# Patient Record
Sex: MALE | Race: WHITE | Employment: FULL TIME | URBAN - NONMETROPOLITAN AREA
[De-identification: names, ages, dates, MRNs, and addresses within clinical notes are randomized per-mention and may not be internally consistent; named-entity substitution may affect disease eponyms.]

---

## 2017-03-27 RX ORDER — PANTOPRAZOLE SODIUM 40 MG/1
TABLET, DELAYED RELEASE ORAL
Qty: 180 TABLET | Refills: 1 | Status: SHIPPED | OUTPATIENT
Start: 2017-03-27 | End: 2017-09-25 | Stop reason: SDUPTHER

## 2017-03-27 RX ORDER — METOPROLOL SUCCINATE 25 MG/1
TABLET, EXTENDED RELEASE ORAL
Qty: 180 TABLET | Refills: 1 | Status: SHIPPED | OUTPATIENT
Start: 2017-03-27 | End: 2017-09-25 | Stop reason: SDUPTHER

## 2017-06-26 RX ORDER — ATORVASTATIN CALCIUM 40 MG/1
TABLET, FILM COATED ORAL
Qty: 90 TABLET | Refills: 2 | Status: SHIPPED | OUTPATIENT
Start: 2017-06-26 | End: 2018-09-19 | Stop reason: SDUPTHER

## 2017-09-01 ENCOUNTER — OFFICE VISIT (OUTPATIENT)
Dept: CARDIOLOGY CLINIC | Age: 52
End: 2017-09-01
Payer: COMMERCIAL

## 2017-09-01 VITALS
DIASTOLIC BLOOD PRESSURE: 76 MMHG | SYSTOLIC BLOOD PRESSURE: 116 MMHG | HEIGHT: 72 IN | WEIGHT: 313.4 LBS | BODY MASS INDEX: 42.45 KG/M2 | HEART RATE: 81 BPM

## 2017-09-01 DIAGNOSIS — E78.01 FAMILIAL HYPERCHOLESTEROLEMIA: ICD-10-CM

## 2017-09-01 DIAGNOSIS — I25.10 CORONARY ARTERY DISEASE INVOLVING NATIVE CORONARY ARTERY OF NATIVE HEART WITHOUT ANGINA PECTORIS: ICD-10-CM

## 2017-09-01 DIAGNOSIS — I10 ESSENTIAL HYPERTENSION: ICD-10-CM

## 2017-09-01 DIAGNOSIS — I51.9 HEART DISEASE: Primary | ICD-10-CM

## 2017-09-01 PROCEDURE — 99213 OFFICE O/P EST LOW 20 MIN: CPT | Performed by: NUCLEAR MEDICINE

## 2017-09-01 PROCEDURE — 93000 ELECTROCARDIOGRAM COMPLETE: CPT | Performed by: NUCLEAR MEDICINE

## 2017-09-01 RX ORDER — ALPRAZOLAM 0.25 MG/1
0.25 TABLET ORAL NIGHTLY PRN
Qty: 30 TABLET | Refills: 0 | Status: SHIPPED | OUTPATIENT
Start: 2017-09-01 | End: 2017-10-01

## 2017-09-01 RX ORDER — NITROGLYCERIN 0.4 MG/1
0.4 TABLET SUBLINGUAL EVERY 5 MIN PRN
Qty: 25 TABLET | Refills: 3 | Status: SHIPPED | OUTPATIENT
Start: 2017-09-01 | End: 2021-08-17 | Stop reason: SDUPTHER

## 2017-09-02 ENCOUNTER — HOSPITAL ENCOUNTER (OUTPATIENT)
Age: 52
Discharge: HOME | End: 2017-09-02
Payer: COMMERCIAL

## 2017-09-02 DIAGNOSIS — E78.01 FAMILIAL HYPERCHOLESTEROLEMIA: ICD-10-CM

## 2017-09-02 LAB
ALBUMIN SERPL-MCNC: 3.6 G/DL (ref 3.5–5.1)
ALP BLD-CCNC: 79 U/L (ref 38–126)
ALT SERPL-CCNC: 33 U/L (ref 11–66)
AST SERPL-CCNC: 24 U/L (ref 5–40)
BILIRUB SERPL-MCNC: 0.5 MG/DL (ref 0.3–1.2)
BILIRUBIN DIRECT: < 0.2 MG/DL (ref 0–0.3)
TOTAL PROTEIN: 6.6 G/DL (ref 6.1–8)

## 2017-09-02 PROCEDURE — 80061 LIPID PANEL: CPT

## 2017-09-02 PROCEDURE — 80076 HEPATIC FUNCTION PANEL: CPT

## 2017-09-02 PROCEDURE — 36415 COLL VENOUS BLD VENIPUNCTURE: CPT

## 2017-09-03 LAB
CHOLESTEROL, TOTAL: 112 MG/DL (ref 100–199)
HDLC SERPL-MCNC: 42 MG/DL
LDL CHOLESTEROL CALCULATED: 56 MG/DL
TRIGL SERPL-MCNC: 71 MG/DL (ref 0–199)

## 2017-09-25 RX ORDER — PANTOPRAZOLE SODIUM 40 MG/1
TABLET, DELAYED RELEASE ORAL
Qty: 180 TABLET | Refills: 3 | Status: SHIPPED | OUTPATIENT
Start: 2017-09-25 | End: 2018-08-10 | Stop reason: SDUPTHER

## 2017-09-25 RX ORDER — METOPROLOL SUCCINATE 25 MG/1
TABLET, EXTENDED RELEASE ORAL
Qty: 180 TABLET | Refills: 3 | Status: SHIPPED | OUTPATIENT
Start: 2017-09-25 | End: 2018-08-10 | Stop reason: SDUPTHER

## 2018-01-24 ENCOUNTER — HOSPITAL ENCOUNTER (EMERGENCY)
Age: 53
Discharge: HOME | End: 2018-01-24
Payer: COMMERCIAL

## 2018-01-24 VITALS
HEART RATE: 101 BPM | TEMPERATURE: 99.8 F | HEIGHT: 72 IN | SYSTOLIC BLOOD PRESSURE: 122 MMHG | BODY MASS INDEX: 42.26 KG/M2 | DIASTOLIC BLOOD PRESSURE: 67 MMHG | WEIGHT: 312 LBS | RESPIRATION RATE: 16 BRPM | OXYGEN SATURATION: 96 %

## 2018-01-24 DIAGNOSIS — J11.1 INFLUENZA-LIKE ILLNESS: Primary | ICD-10-CM

## 2018-01-24 LAB
FLU A ANTIGEN: NEGATIVE
FLU B ANTIGEN: NEGATIVE

## 2018-01-24 PROCEDURE — 87804 INFLUENZA ASSAY W/OPTIC: CPT

## 2018-01-24 PROCEDURE — 99213 OFFICE O/P EST LOW 20 MIN: CPT

## 2018-01-24 PROCEDURE — 99213 OFFICE O/P EST LOW 20 MIN: CPT | Performed by: NURSE PRACTITIONER

## 2018-01-24 RX ORDER — OSELTAMIVIR PHOSPHATE 75 MG/1
75 CAPSULE ORAL 2 TIMES DAILY
Qty: 10 CAPSULE | Refills: 0 | Status: SHIPPED | OUTPATIENT
Start: 2018-01-24 | End: 2018-01-29

## 2018-01-24 ASSESSMENT — PAIN SCALES - GENERAL: PAINLEVEL_OUTOF10: 10

## 2018-01-24 ASSESSMENT — ENCOUNTER SYMPTOMS
FACIAL SWELLING: 0
COUGH: 1
SINUS PRESSURE: 0
WHEEZING: 0
PHOTOPHOBIA: 0
VOMITING: 0
SWOLLEN GLANDS: 0
SHORTNESS OF BREATH: 0
BLURRED VISION: 0
SORE THROAT: 0
NAUSEA: 1
RHINORRHEA: 1

## 2018-01-24 ASSESSMENT — PAIN DESCRIPTION - LOCATION: LOCATION: HEAD

## 2018-01-25 NOTE — ED PROVIDER NOTES
[address]oss[address] [id number]  Urgent Care Encounter       CHIEF COMPLAINT       Chief Complaint   Patient presents with    Headache     chills/sweating    Generalized Body Aches       Nurses Notes reviewed and I agree except as noted in the HPI. HISTORY OF PRESENT ILLNESS   Bryce Monroy is a 52 y.o. male who presents To the urgent care center complaining of generalized body aches chills headache nonproductive cough. Patient states symptoms started today when he was at work at 4 PM.  He works as a  states he finished his shift came here for evaluation. At the present time patient sitting in the room as his code on complains of feeling chilled and headache. The history is provided by the patient.    Headache   Pain location:  Generalized  Quality:  Dull  Radiates to:  Does not radiate  Severity currently:  10/10  Severity at highest:  10/10  Onset quality:  Sudden  Duration:  1 day  Timing:  Constant  Progression:  Waxing and waning  Chronicity:  New  Context: coughing    Relieved by:  Nothing  Worsened by:  Nothing  Ineffective treatments:  Acetaminophen and NSAIDs  Associated symptoms: congestion, cough, fatigue, fever, loss of balance, myalgias and nausea    Associated symptoms: no blurred vision, no dizziness, no drainage, no ear pain, no hearing loss, no neck pain, no neck stiffness, no paresthesias, no photophobia, no sinus pressure, no sore throat, no swollen glands, no syncope, no URI and no vomiting    Cough:     Cough characteristics:  Non-productive    Severity:  Mild    Onset quality:  Gradual    Duration:  1 day    Timing:  Intermittent    Progression:  Waxing and waning    Chronicity:  New  Fatigue:     Severity:  Moderate    Duration:  1 day    Timing:  Constant    Progression:  Unchanged  Fever:     Duration:  1 day    Timing:  Intermittent    Max temp PTA:  99.8    Temp source:  Oral and temporal    Progression:  Worsening  Myalgias:     Location:  Generalized    Quality: Cyanocobalamin (VITAMIN B 12 PO) Take 1,000 mg by mouth. ALLERGIES     Patient is has No Known Allergies. Patients   Immunization History   Administered Date(s) Administered    Pneumococcal Conjugate 7-valent 10/29/2011       FAMILY HISTORY     Patient's family history includes COPD in his mother; Cancer in his father and mother; Coronary Art Dis in his father and mother; Heart Disease in his father and mother. SOCIAL HISTORY     Patient  reports that he quit smoking about 15 years ago. His smoking use included Cigarettes. His smokeless tobacco use includes Chew. He reports that he drinks alcohol. He reports that he does not use drugs. PHYSICAL EXAM     ED TRIAGE VITALS  BP: 122/67, Temp: 99.8 °F (37.7 °C), Pulse: 101, Resp: 16, SpO2: 96 %,Estimated body mass index is 42.31 kg/m² as calculated from the following:    Height as of this encounter: 6' (1.829 m). Weight as of this encounter: 312 lb (141.5 kg). ,No LMP for male patient. Physical Exam   Constitutional: He is oriented to person, place, and time. Vital signs are normal. He appears well-developed and well-nourished. He is cooperative. Non-toxic appearance. He does not have a sickly appearance. He does not appear ill. No distress. HENT:   Head: Normocephalic. Right Ear: Hearing, tympanic membrane, external ear and ear canal normal. No drainage, swelling or tenderness. No mastoid tenderness. Tympanic membrane is not perforated and not erythematous. Left Ear: Hearing, tympanic membrane, external ear and ear canal normal. No drainage, swelling or tenderness. No mastoid tenderness. Tympanic membrane is not perforated and not erythematous. Nose: Nose normal. No mucosal edema or rhinorrhea. Right sinus exhibits no maxillary sinus tenderness and no frontal sinus tenderness. Left sinus exhibits no maxillary sinus tenderness and no frontal sinus tenderness.    Mouth/Throat: Uvula is midline, oropharynx is clear and moist and mucous membranes are normal. No oropharyngeal exudate, posterior oropharyngeal edema or posterior oropharyngeal erythema. Neck: Normal range of motion and full passive range of motion without pain. Neck supple. No spinous process tenderness and no muscular tenderness present. Cardiovascular: Regular rhythm, S1 normal, S2 normal and normal heart sounds. Tachycardia present. Pulmonary/Chest: Effort normal and breath sounds normal. No accessory muscle usage. No respiratory distress. He has no decreased breath sounds. He has no wheezes. He has no rhonchi. He has no rales. He exhibits no tenderness. Abdominal: Normal appearance. Lymphadenopathy:        Head (right side): No submental, no submandibular, no tonsillar, no preauricular, no posterior auricular and no occipital adenopathy present. Head (left side): No submental, no submandibular, no tonsillar, no preauricular, no posterior auricular and no occipital adenopathy present. He has no cervical adenopathy. Right: No supraclavicular adenopathy present. Left: No supraclavicular adenopathy present. Neurological: He is alert and oriented to person, place, and time. Skin: Skin is warm and dry. He is not diaphoretic. Nursing note and vitals reviewed. DIAGNOSTIC RESULTS   Labs:  Results for orders placed or performed during the hospital encounter of 01/24/18   Rapid influenza A/B antigens   Result Value Ref Range    Flu A Antigen NEGATIVE NEGATIVE    Flu B Antigen NEGATIVE NEGATIVE       IMAGING:    No orders to display         EKG:      URGENT CARE COURSE:     Vitals:    01/24/18 1943   BP: 122/67   Pulse: 101   Resp: 16   Temp: 99.8 °F (37.7 °C)   TempSrc: Temporal   SpO2: 96%   Weight: (!) 312 lb (141.5 kg)   Height: 6' (1.829 m)       Medications - No data to display    ED Course        PROCEDURES:  None    FINAL IMPRESSION      1.  Influenza-like illness          DISPOSITION/PLAN   DISPOSITION Decision To Discharge

## 2018-08-12 RX ORDER — METOPROLOL SUCCINATE 25 MG/1
TABLET, EXTENDED RELEASE ORAL
Qty: 180 TABLET | Refills: 4 | Status: SHIPPED | OUTPATIENT
Start: 2018-08-12 | End: 2018-09-19 | Stop reason: SDUPTHER

## 2018-08-12 RX ORDER — PANTOPRAZOLE SODIUM 40 MG/1
TABLET, DELAYED RELEASE ORAL
Qty: 180 TABLET | Refills: 4 | Status: SHIPPED | OUTPATIENT
Start: 2018-08-12 | End: 2018-09-19 | Stop reason: SDUPTHER

## 2018-09-19 ENCOUNTER — OFFICE VISIT (OUTPATIENT)
Dept: CARDIOLOGY CLINIC | Age: 53
End: 2018-09-19
Payer: COMMERCIAL

## 2018-09-19 VITALS
HEIGHT: 72 IN | HEART RATE: 93 BPM | SYSTOLIC BLOOD PRESSURE: 140 MMHG | BODY MASS INDEX: 42.29 KG/M2 | DIASTOLIC BLOOD PRESSURE: 82 MMHG | WEIGHT: 312.2 LBS

## 2018-09-19 DIAGNOSIS — I51.9 HEART DISEASE: ICD-10-CM

## 2018-09-19 DIAGNOSIS — R07.2 PRECORDIAL PAIN: ICD-10-CM

## 2018-09-19 DIAGNOSIS — I25.10 CORONARY ARTERY DISEASE INVOLVING NATIVE CORONARY ARTERY OF NATIVE HEART WITHOUT ANGINA PECTORIS: Primary | ICD-10-CM

## 2018-09-19 DIAGNOSIS — Z98.62 S/P ANGIOPLASTY: ICD-10-CM

## 2018-09-19 DIAGNOSIS — E78.00 PURE HYPERCHOLESTEROLEMIA: ICD-10-CM

## 2018-09-19 PROCEDURE — 99214 OFFICE O/P EST MOD 30 MIN: CPT | Performed by: NUCLEAR MEDICINE

## 2018-09-19 PROCEDURE — 93000 ELECTROCARDIOGRAM COMPLETE: CPT | Performed by: NUCLEAR MEDICINE

## 2018-09-19 RX ORDER — ATORVASTATIN CALCIUM 40 MG/1
TABLET, FILM COATED ORAL
Qty: 90 TABLET | Refills: 2 | Status: SHIPPED | OUTPATIENT
Start: 2018-09-19 | End: 2019-11-20 | Stop reason: SDUPTHER

## 2018-09-19 RX ORDER — METOPROLOL SUCCINATE 25 MG/1
TABLET, EXTENDED RELEASE ORAL
Qty: 180 TABLET | Refills: 4 | Status: SHIPPED | OUTPATIENT
Start: 2018-09-19 | End: 2019-10-16 | Stop reason: SDUPTHER

## 2018-09-19 RX ORDER — PANTOPRAZOLE SODIUM 40 MG/1
TABLET, DELAYED RELEASE ORAL
Qty: 180 TABLET | Refills: 4 | Status: SHIPPED | OUTPATIENT
Start: 2018-09-19 | End: 2019-10-16 | Stop reason: SDUPTHER

## 2018-09-19 NOTE — PROGRESS NOTES
Stenting of distal LAD using Xience 2.5 X 15 overlapping distally with Xience 2.5 X 12 mm, Dr. Sheryle Short Testosterone deficiency     Thyroid nodule     The patient is euthyroid.  Unspecified sleep apnea       Past Surgical History:   Procedure Laterality Date    APPENDECTOMY      BACK SURGERY      X 2 FOR HERNIATED DISCS    CARDIOVASCULAR STRESS TEST  3 26 2010    Mild chest discomfort induced by IV Lexiscan that resolved spontaneously. No arrhythmias. No EKG changes to suggest ischemia.  CARPAL TUNNEL RELEASE  1980    COLONOSCOPY  2013    CORONARY ANGIOPLASTY      X 2 STENTS    DIAGNOSTIC CARDIAC CATH LAB PROCEDURE  2 22 2010    Successful drug-eluting stent x 2 of mid LAD. LV borderline normal LV function. EF 50-55%. No wall motion abnormalities detected and no MR. Normal hemodynamics. Coronaries - diffuse left anterior descending (LAD) disease with proximal 50% and distal around 70%. [facility]    right    KNEE SURGERY  1980    left    LYMPH NODE BIOPSY  2011    neck    [address]    UPPER GASTROINTESTINAL ENDOSCOPY  2013     Family History   Problem Relation Age of Onset    Heart Disease Mother         Bypass/Surgery.  Cancer Mother         Breast cancer.  Coronary Art Dis Mother         History of CAD with myocardial infarction and open heart surgery in her 63's.  COPD Mother     Heart Disease Father         Bypass/Surgery.  Cancer Father         Leukemia.  Coronary Art Dis Father         History of CAD with myocardial infarction and open heart surgery around his 52's.  Prostate Cancer Neg Hx      Social History   Substance Use Topics    Smoking status: Former Smoker     Types: Cigarettes     Quit date: 8/3/2002    Smokeless tobacco: Current User     Types: Chew      Comment: Patient states, \"he chews tobacco.\"     Alcohol use 0.0 oz/week      Comment: Patient states, \"seldom. \"      Current Outpatient Prescriptions   Medication Sig

## 2018-10-04 ENCOUNTER — HOSPITAL ENCOUNTER (OUTPATIENT)
Dept: NON INVASIVE DIAGNOSTICS | Age: 53
Discharge: HOME | End: 2018-10-04
Payer: COMMERCIAL

## 2018-10-04 ENCOUNTER — TELEPHONE (OUTPATIENT)
Dept: CARDIOLOGY CLINIC | Age: 53
End: 2018-10-04

## 2018-10-04 DIAGNOSIS — R07.2 PRECORDIAL PAIN: ICD-10-CM

## 2018-10-04 DIAGNOSIS — I25.10 CORONARY ARTERY DISEASE INVOLVING NATIVE CORONARY ARTERY OF NATIVE HEART WITHOUT ANGINA PECTORIS: ICD-10-CM

## 2018-10-04 DIAGNOSIS — Z98.62 S/P ANGIOPLASTY: ICD-10-CM

## 2018-10-04 LAB
LV EF: 60 %
LVEF MODALITY: NORMAL

## 2018-10-04 PROCEDURE — 3430000000 HC RX DIAGNOSTIC RADIOPHARMACEUTICAL: Performed by: NUCLEAR MEDICINE

## 2018-10-04 PROCEDURE — 6360000002 HC RX W HCPCS

## 2018-10-04 PROCEDURE — 78452 HT MUSCLE IMAGE SPECT MULT: CPT

## 2018-10-04 PROCEDURE — 93306 TTE W/DOPPLER COMPLETE: CPT

## 2018-10-04 PROCEDURE — 2709999900 HC NON-CHARGEABLE SUPPLY

## 2018-10-04 PROCEDURE — 93017 CV STRESS TEST TRACING ONLY: CPT | Performed by: NUCLEAR MEDICINE

## 2018-10-04 PROCEDURE — A9500 TC99M SESTAMIBI: HCPCS | Performed by: NUCLEAR MEDICINE

## 2018-10-04 RX ADMIN — Medication 32.7 MILLICURIE: at 08:52

## 2018-10-04 RX ADMIN — Medication 9.4 MILLICURIE: at 08:07

## 2018-12-02 ENCOUNTER — HOSPITAL ENCOUNTER (EMERGENCY)
Age: 53
Discharge: HOME | End: 2018-12-02
Payer: COMMERCIAL

## 2018-12-02 VITALS
WEIGHT: 310 LBS | BODY MASS INDEX: 41.99 KG/M2 | DIASTOLIC BLOOD PRESSURE: 76 MMHG | HEART RATE: 85 BPM | HEIGHT: 72 IN | OXYGEN SATURATION: 95 % | SYSTOLIC BLOOD PRESSURE: 130 MMHG | TEMPERATURE: 98.9 F | RESPIRATION RATE: 16 BRPM

## 2018-12-02 DIAGNOSIS — J20.9 ACUTE BRONCHITIS, UNSPECIFIED ORGANISM: Primary | ICD-10-CM

## 2018-12-02 PROCEDURE — 99212 OFFICE O/P EST SF 10 MIN: CPT

## 2018-12-02 PROCEDURE — 99214 OFFICE O/P EST MOD 30 MIN: CPT | Performed by: NURSE PRACTITIONER

## 2018-12-02 RX ORDER — DOXYCYCLINE HYCLATE 100 MG/1
100 CAPSULE ORAL 2 TIMES DAILY
Qty: 14 CAPSULE | Refills: 0 | Status: SHIPPED | OUTPATIENT
Start: 2018-12-02 | End: 2018-12-09

## 2018-12-02 RX ORDER — ALBUTEROL SULFATE 90 UG/1
2 AEROSOL, METERED RESPIRATORY (INHALATION) EVERY 4 HOURS PRN
Qty: 1 INHALER | Refills: 0 | Status: SHIPPED | OUTPATIENT
Start: 2018-12-02 | End: 2019-05-02

## 2018-12-02 RX ORDER — DEXTROMETHORPHAN HYDROBROMIDE AND PROMETHAZINE HYDROCHLORIDE 15; 6.25 MG/5ML; MG/5ML
5 SYRUP ORAL NIGHTLY PRN
Qty: 35 ML | Refills: 0 | Status: SHIPPED | OUTPATIENT
Start: 2018-12-02 | End: 2018-12-02

## 2018-12-02 RX ORDER — DOXYCYCLINE HYCLATE 100 MG/1
100 CAPSULE ORAL 2 TIMES DAILY
Qty: 14 CAPSULE | Refills: 0 | Status: SHIPPED | OUTPATIENT
Start: 2018-12-02 | End: 2018-12-02

## 2018-12-02 RX ORDER — DEXTROMETHORPHAN HYDROBROMIDE AND PROMETHAZINE HYDROCHLORIDE 15; 6.25 MG/5ML; MG/5ML
5 SYRUP ORAL NIGHTLY PRN
Qty: 35 ML | Refills: 0 | Status: SHIPPED | OUTPATIENT
Start: 2018-12-02 | End: 2018-12-09

## 2018-12-02 ASSESSMENT — ENCOUNTER SYMPTOMS
TROUBLE SWALLOWING: 0
SINUS PRESSURE: 0
WHEEZING: 1
SHORTNESS OF BREATH: 0
SINUS CONGESTION: 0
CHEST TIGHTNESS: 0
VOMITING: 0
RHINORRHEA: 0
COUGH: 1
STRIDOR: 0
SORE THROAT: 0
NAUSEA: 0
DIARRHEA: 0
VOICE CHANGE: 0

## 2019-03-29 ENCOUNTER — HOSPITAL ENCOUNTER (EMERGENCY)
Age: 54
Discharge: HOME | End: 2019-03-29
Payer: COMMERCIAL

## 2019-03-29 VITALS
BODY MASS INDEX: 40.63 KG/M2 | RESPIRATION RATE: 20 BRPM | HEART RATE: 93 BPM | DIASTOLIC BLOOD PRESSURE: 68 MMHG | HEIGHT: 72 IN | OXYGEN SATURATION: 95 % | SYSTOLIC BLOOD PRESSURE: 136 MMHG | TEMPERATURE: 98.3 F | WEIGHT: 300 LBS

## 2019-03-29 DIAGNOSIS — J01.10 ACUTE FRONTAL SINUSITIS, RECURRENCE NOT SPECIFIED: Primary | ICD-10-CM

## 2019-03-29 PROCEDURE — 99212 OFFICE O/P EST SF 10 MIN: CPT

## 2019-03-29 PROCEDURE — 99213 OFFICE O/P EST LOW 20 MIN: CPT | Performed by: NURSE PRACTITIONER

## 2019-03-29 RX ORDER — PREDNISONE 20 MG/1
20 TABLET ORAL 2 TIMES DAILY
Qty: 10 TABLET | Refills: 0 | Status: SHIPPED | OUTPATIENT
Start: 2019-03-29 | End: 2019-04-03

## 2019-03-29 RX ORDER — AMOXICILLIN AND CLAVULANATE POTASSIUM 875; 125 MG/1; MG/1
1 TABLET, FILM COATED ORAL 2 TIMES DAILY
Qty: 14 TABLET | Refills: 0 | Status: SHIPPED | OUTPATIENT
Start: 2019-03-29 | End: 2019-04-05

## 2019-03-29 ASSESSMENT — PAIN DESCRIPTION - PROGRESSION: CLINICAL_PROGRESSION: GRADUALLY WORSENING

## 2019-03-29 ASSESSMENT — ENCOUNTER SYMPTOMS
DIARRHEA: 0
VOMITING: 0
WHEEZING: 0
RHINORRHEA: 0
SINUS PAIN: 0
SINUS PRESSURE: 1
NAUSEA: 0
SORE THROAT: 0
SHORTNESS OF BREATH: 0
COUGH: 1

## 2019-03-29 ASSESSMENT — PAIN DESCRIPTION - ONSET: ONSET: GRADUAL

## 2019-03-29 ASSESSMENT — PAIN DESCRIPTION - PAIN TYPE: TYPE: ACUTE PAIN

## 2019-03-29 ASSESSMENT — PAIN DESCRIPTION - FREQUENCY: FREQUENCY: CONTINUOUS

## 2019-03-29 ASSESSMENT — PAIN DESCRIPTION - DESCRIPTORS: DESCRIPTORS: ACHING

## 2019-03-29 ASSESSMENT — PAIN - FUNCTIONAL ASSESSMENT: PAIN_FUNCTIONAL_ASSESSMENT: ACTIVITIES ARE NOT PREVENTED

## 2019-03-29 ASSESSMENT — PAIN DESCRIPTION - LOCATION: LOCATION: HEAD

## 2019-03-29 NOTE — ED PROVIDER NOTES
[address]  Urgent Care Encounter       CHIEF COMPLAINT       Chief Complaint   Patient presents with    Cough    Nasal Congestion    Headache       Nurses Notes reviewed and I agree except as noted in the HPI. HISTORY OF PRESENT ILLNESS   Yamilet Ambrocio is a 62 y.o. male who presents to [facility] of cough, nasal congestion and headache for the past 3 days. Patient initially lost his voice but this has resolved. Patient reports cough is productive of light green sputum on occasion. Headache is located in the frontal area. Patient reports PND and a fever last night of 100°F.  No fever prior to or after. Ears are normal and no complaints of sore throat. The history is provided by the patient. REVIEW OF SYSTEMS     Review of Systems   Constitutional: Positive for fatigue and fever. Negative for appetite change and chills. HENT: Positive for congestion, postnasal drip and sinus pressure. Negative for ear pain, rhinorrhea, sinus pain and sore throat. Respiratory: Positive for cough. Negative for shortness of breath and wheezing. Cardiovascular: Negative for chest pain. Gastrointestinal: Negative for diarrhea, nausea and vomiting. Musculoskeletal: Negative for myalgias. Neurological: Positive for headaches. Negative for dizziness. PAST MEDICAL HISTORY         Diagnosis Date    Anxiety attacks     Anxiety due to family issues.  Arthritis     Asthma     as child    Atypical chest pain     Cuevas esophagus     Chronic back pain     Coronary artery disease     S/P stent of the LAD by Dr. Olimpia Palafox on 2 23 2010.  Erectile dysfunction     Family history of premature CAD     Gastroesophageal reflux disease     Groin hematoma     Herniated disc 2011    back and neck    History of erectile dysfunction 2008    History of giardia infection     History of Giardia.  History of iron deficiency     History of pulmonary embolus (PE) 1980's.     Remote history of pulmonary embolus and negative computed tomography for pulmonary embolus.  History of smoking     Hyperlipidemia     Well managed.  Hypogonadism, male     The patient has central hypogonadism.  Joint pain     Morbid obesity     S/P angioplasty     S/P PTCA: 1/12/2015: FFR-guided stenting of distal and mid-LAD using Xience Alpine 2.5X15, post-2.77, and Alpine 2.75X18 mm, post-3.18 mm.  1/12/2015 1/12/2015: FFR-guided stenting of distal and mid-LAD using Xience Alpine 2.5 X 15 mm, post-dilated to 2.77 mm, and Xience Alpine 2.75 X 18 mm, post-dilated to 3.18 mm. Dr. Wing Cheadle   2/22/2010: Stenting of distal LAD using Xience 2.5 X 15 overlapping distally with Xience 2.5 X 12 mm, Dr. Christal Hou Testosterone deficiency     Thyroid nodule     The patient is euthyroid.  Unspecified sleep apnea        SURGICALHISTORY     Patient  has a past surgical history that includes back surgery; Carpal tunnel release (1980); Elbow surgery (1980); knee surgery (1980); Appendectomy; Coronary angioplasty; cardiovascular stress test (3 26 2010); Diagnostic Cardiac Cath Lab Procedure (2 22 2010); lymph node biopsy (2011); Spine surgery (1993, 2000); Upper gastrointestinal endoscopy (2013); and Colonoscopy (2013).     CURRENT MEDICATIONS       Discharge Medication List as of 3/29/2019  3:02 PM      CONTINUE these medications which have NOT CHANGED    Details   albuterol sulfate  (90 Base) MCG/ACT inhaler Inhale 2 puffs into the lungs every 4 hours as needed for Wheezing or Shortness of Breath, Disp-1 Inhaler, R-0Normal      pantoprazole (PROTONIX) 40 MG tablet TAKE 1 TABLET TWICE A DAY, Disp-180 tablet, R-4Normal      metoprolol succinate (TOPROL XL) 25 MG extended release tablet TAKE 1 TABLET TWICE A DAY, Disp-180 tablet, R-4Normal      atorvastatin (LIPITOR) 40 MG tablet TAKE 1 TABLET EVERY OTHER DAY, Disp-90 tablet, R-2Normal      nitroGLYCERIN (NITROSTAT) 0.4 MG SL tablet Place 1 tablet under the tongue every 5 minutes as needed for Chest pain, Disp-25 tablet, R-3Normal      Multiple Vitamins-Minerals (THERAPEUTIC MULTIVITAMIN-MINERALS) tablet Take 1 tablet by mouth daily      aspirin 81 MG tablet Take 81 mg by mouth daily. Cyanocobalamin (VITAMIN B 12 PO) Take 1,000 mg by mouth. ALLERGIES     Patient is has No Known Allergies. Patients   Immunization History   Administered Date(s) Administered    Pneumococcal Conjugate 7-valent 10/29/2011       FAMILY HISTORY     Patient's family history includes COPD in his mother; Cancer in his father and mother; Coronary Art Dis in his father and mother; Heart Disease in his father and mother. SOCIAL HISTORY     Patient  reports that he quit smoking about 16 years ago. His smoking use included cigarettes. His smokeless tobacco use includes chew. He reports that he drinks alcohol. He reports that he does not use drugs. PHYSICAL EXAM     ED TRIAGE VITALS  BP: 136/68, Temp: 98.3 °F (36.8 °C), Pulse: 93, Resp: 20, SpO2: 95 %,Estimated body mass index is 40.69 kg/m² as calculated from the following:    Height as of this encounter: 6' (1.829 m). Weight as of this encounter: 300 lb (136.1 kg). ,No LMP for male patient. Physical Exam   Constitutional: He is oriented to person, place, and time. Vital signs are normal. He appears well-developed and well-nourished. He is cooperative. He appears ill (mild). HENT:   Head: Normocephalic and atraumatic. Right Ear: Hearing, tympanic membrane, external ear and ear canal normal.   Left Ear: Hearing, tympanic membrane, external ear and ear canal normal.   Nose: Right sinus exhibits frontal sinus tenderness. Right sinus exhibits no maxillary sinus tenderness. Left sinus exhibits frontal sinus tenderness. Left sinus exhibits no maxillary sinus tenderness. Mouth/Throat: Uvula is midline and mucous membranes are normal. Posterior oropharyngeal erythema (mild) present.  No oropharyngeal exudate, posterior oropharyngeal edema or tonsillar abscesses. Neck: Neck supple. Cardiovascular: Normal rate, regular rhythm and normal heart sounds. Pulmonary/Chest: Effort normal and breath sounds normal. No respiratory distress. Lymphadenopathy:        Head (right side): No submental, no submandibular, no tonsillar, no preauricular, no posterior auricular and no occipital adenopathy present. Head (left side): No submental, no submandibular, no tonsillar, no preauricular, no posterior auricular and no occipital adenopathy present. He has no cervical adenopathy. Neurological: He is alert and oriented to person, place, and time. Skin: Skin is warm and dry. Psychiatric: He has a normal mood and affect. His speech is normal and behavior is normal.   Nursing note and vitals reviewed. DIAGNOSTIC RESULTS     Labs:No results found for this visit on 03/29/19. IMAGING:    No orders to display         EKG:      URGENT CARE COURSE:     Vitals:    03/29/19 1432   BP: 136/68   Pulse: 93   Resp: 20   Temp: 98.3 °F (36.8 °C)   SpO2: 95%   Weight: 300 lb (136.1 kg)   Height: 6' (1.829 m)       Medications - No data to display         PROCEDURES:  None    FINAL IMPRESSION      1. Acute frontal sinusitis, recurrence not specified          DISPOSITION/ PLAN     Plenty of fluids orally. Salt water gargles as needed for sore throat or OTC throat spray/lozenges. Cool mist humidifier at bedside for congestion. Saline rinses as needed for nasal congestion. May take Tylenol and/or Ibuprofen for fever or body aches as directed. May take OTC antihistamines/ decongestant as needed. Follow up with family doctor as needed. Pt to go to ER if symptoms worsen, new symptoms develop, high fever >102, vomiting, breathing difficulty, lethargy. Take antibiotic as prescribed until gone. Do not stop taking even if your symptoms have improved. Prednisone as prescribed for the full 5 days.     Patient with acute sinusitis and will be treated with Augmentin ×7 days and prednisone burst ×5 days. Continue with the over-the-counter decongestants as desired. Follow-up with family doctor in the next week if not improved. Further instructions outlined above. All the patient's questions answered. The patient/parent agreed with the plan. The patient was discharged from the  center in good condition.       PATIENT REFERRED TO:  Gabe Monroe, DO  [address] [facility] [address] / [facility] [address]      DISCHARGE MEDICATIONS:  Discharge Medication List as of 3/29/2019  3:02 PM      START taking these medications    Details   amoxicillin-clavulanate (AUGMENTIN) 875-125 MG per tablet Take 1 tablet by mouth 2 times daily for 7 days, Disp-14 tablet, R-0Normal      predniSONE (DELTASONE) 20 MG tablet Take 1 tablet by mouth 2 times daily for 5 days, Disp-10 tablet, R-0Normal             Discharge Medication List as of 3/29/2019  3:02 PM          Discharge Medication List as of 3/29/2019  3:02 PM          KANIKA Morrison CNP    (Please note that portions of this note were completed with a voice recognition program. Efforts were made to edit the dictations but occasionally words are mis-transcribed.)         KANIKA Morrison CNP  03/29/19 4771

## 2019-03-29 NOTE — ED NOTES
PT GIVEN DISCHARGE INSTRUCTIONS, VERBALIZES UNDERSTANDING. PT ASSESSMENT UNCHANGED, DISCHARGED IN STABLE CONDITION.         Dana Alonso RN  03/29/19 1056

## 2019-03-29 NOTE — ED TRIAGE NOTES
Pt to urgent care with c/o congestion , cough and headache. New onset of symptoms started roughly on Monday.

## 2019-04-01 ENCOUNTER — TELEPHONE (OUTPATIENT)
Dept: FAMILY MEDICINE CLINIC | Age: 54
End: 2019-04-01

## 2019-04-10 ENCOUNTER — OFFICE VISIT (OUTPATIENT)
Dept: CARDIOLOGY CLINIC | Age: 54
End: 2019-04-10
Payer: COMMERCIAL

## 2019-04-10 VITALS
DIASTOLIC BLOOD PRESSURE: 78 MMHG | BODY MASS INDEX: 42.66 KG/M2 | SYSTOLIC BLOOD PRESSURE: 120 MMHG | WEIGHT: 315 LBS | HEART RATE: 86 BPM | HEIGHT: 72 IN

## 2019-04-10 DIAGNOSIS — I10 ESSENTIAL HYPERTENSION: ICD-10-CM

## 2019-04-10 DIAGNOSIS — I25.10 CORONARY ARTERY DISEASE INVOLVING NATIVE CORONARY ARTERY OF NATIVE HEART WITHOUT ANGINA PECTORIS: Primary | ICD-10-CM

## 2019-04-10 DIAGNOSIS — E78.01 FAMILIAL HYPERCHOLESTEROLEMIA: ICD-10-CM

## 2019-04-10 PROCEDURE — 99213 OFFICE O/P EST LOW 20 MIN: CPT | Performed by: NUCLEAR MEDICINE

## 2019-04-10 NOTE — PROGRESS NOTES
Thyroid nodule     The patient is euthyroid.  Unspecified sleep apnea       Past Surgical History:   Procedure Laterality Date    APPENDECTOMY      BACK SURGERY      X 2 FOR HERNIATED DISCS    CARDIOVASCULAR STRESS TEST  3 26 2010    Mild chest discomfort induced by IV Lexiscan that resolved spontaneously. No arrhythmias. No EKG changes to suggest ischemia.  CARPAL TUNNEL RELEASE      COLONOSCOPY      CORONARY ANGIOPLASTY      X 2 STENTS    DIAGNOSTIC CARDIAC CATH LAB PROCEDURE  2010    Successful drug-eluting stent x 2 of mid LAD. LV borderline normal LV function. EF 50-55%. No wall motion abnormalities detected and no MR. Normal hemodynamics. Coronaries - diffuse left anterior descending (LAD) disease with proximal 50% and distal around 70%. [address] [facility],     right    KNEE SURGERY      left    LYMPH NODE BIOPSY      neck    [facility]    UPPER GASTROINTESTINAL ENDOSCOPY  2013     Family History   Problem Relation Age of Onset    Heart Disease Mother         Bypass/Surgery.  Cancer Mother         Breast cancer.  Coronary Art Dis Mother         History of CAD with myocardial infarction and open heart surgery in her 63's.  COPD Mother     Heart Disease Father         Bypass/Surgery.  Cancer Father         Leukemia.  Coronary Art Dis Father         History of CAD with myocardial infarction and open heart surgery around his 52's.  Prostate Cancer Neg Hx      Social History     Tobacco Use    Smoking status: Former Smoker     Types: Cigarettes     Last attempt to quit: 8/3/2002     Years since quittin.6    Smokeless tobacco: Current User     Types: Chew    Tobacco comment: Patient states, \"he chews tobacco.\"    Substance Use Topics    Alcohol use: Yes     Alcohol/week: 0.0 oz     Comment: Patient states, \"seldom. \"      Current Outpatient Medications   Medication Sig Dispense Refill    albuterol sulfate  (90 Base) MCG/ACT inhaler Inhale 2 puffs into the lungs every 4 hours as needed for Wheezing or Shortness of Breath 1 Inhaler 0    pantoprazole (PROTONIX) 40 MG tablet TAKE 1 TABLET TWICE A  tablet 4    metoprolol succinate (TOPROL XL) 25 MG extended release tablet TAKE 1 TABLET TWICE A  tablet 4    atorvastatin (LIPITOR) 40 MG tablet TAKE 1 TABLET EVERY OTHER DAY 90 tablet 2    nitroGLYCERIN (NITROSTAT) 0.4 MG SL tablet Place 1 tablet under the tongue every 5 minutes as needed for Chest pain 25 tablet 3    Multiple Vitamins-Minerals (THERAPEUTIC MULTIVITAMIN-MINERALS) tablet Take 1 tablet by mouth daily      aspirin 81 MG tablet Take 81 mg by mouth daily.  Cyanocobalamin (VITAMIN B 12 PO) Take 1,000 mg by mouth. No current facility-administered medications for this visit. No Known Allergies  Health Maintenance   Topic Date Due    Hepatitis C screen  1965    Pneumococcal 0-64 years Vaccine (1 of 1 - PPSV23) 01/21/1969    HIV screen  01/21/1978    DTaP/Tdap/Td vaccine (1 - Tdap) 01/21/1982    Diabetes screen  01/21/2003    Shingles Vaccine (1 of 2) 01/21/2013    Colon cancer screen colonoscopy  01/21/2013    Flu vaccine (Season Ended) 09/01/2019    Lipid screen  09/02/2022       Subjective:  Review of Systems  General:   No fever, no chills, No fatigue or weight loss  Pulmonary:    No dyspnea, no wheezing  Cardiac:    Denies recent chest pain,   GI:     No nausea or vomiting, no abdominal pain  Neuro:    No dizziness or light headedness,   Musculoskeletal:  No recent active issues  Extremities:   No edema, good peripheral pulses      Objective:  Physical Exam  BP (!) 143/82   Pulse 86   Ht 6' (1.829 m)   Wt (!) 329 lb (149.2 kg)   BMI 44.62 kg/m²   General:   Well developed, well nourished  Lungs:   Clear to auscultation  Heart:    Normal S1 S2, Slight murmur.  no rubs, no gallops  Abdomen:   Soft, non tender, no organomegalies, positive bowel sounds  Extremities:   No edema, no cyanosis, good peripheral pulses  Neurological:   Awake, alert, oriented. No obvious focal deficits  Musculoskelatal:  No obvious deformities    Assessment:      Diagnosis Orders   1. Coronary artery disease involving native coronary artery of native heart without angina pectoris     2. Essential hypertension     3. Familial hypercholesterolemia     cardiac fair for now       Plan:  No follow-ups on file. As above  Refer to sleep clinic   Continue risk factor modification and medical management  Thank you for allowing me to participate in the care of your patient. Please don't hesitate to contact me regarding any further issues related to the patient care    Orders Placed:  No orders of the defined types were placed in this encounter. Medications Prescribed:  No orders of the defined types were placed in this encounter. Discussed use, benefit, and side effects of prescribed medications. All patient questions answered. Pt voicedunderstanding. Instructed to continue current medications, diet and exercise. Continue risk factor modification and medical management. Patient agreed with treatment plan. Follow up as directed.     Electronically signedby Susana Chisholm MD on 4/10/2019 at 4:04 PM

## 2019-04-23 ENCOUNTER — HOSPITAL ENCOUNTER (EMERGENCY)
Age: 54
Discharge: HOME | End: 2019-04-23
Payer: COMMERCIAL

## 2019-04-23 ENCOUNTER — APPOINTMENT (OUTPATIENT)
Dept: GENERAL RADIOLOGY | Age: 54
End: 2019-04-23
Payer: COMMERCIAL

## 2019-04-23 VITALS
OXYGEN SATURATION: 96 % | SYSTOLIC BLOOD PRESSURE: 137 MMHG | WEIGHT: 300 LBS | BODY MASS INDEX: 40.63 KG/M2 | RESPIRATION RATE: 18 BRPM | HEIGHT: 72 IN | DIASTOLIC BLOOD PRESSURE: 68 MMHG | TEMPERATURE: 98 F | HEART RATE: 104 BPM

## 2019-04-23 DIAGNOSIS — G89.29 CHRONIC LEFT-SIDED LOW BACK PAIN WITHOUT SCIATICA: Primary | ICD-10-CM

## 2019-04-23 DIAGNOSIS — M54.50 CHRONIC LEFT-SIDED LOW BACK PAIN WITHOUT SCIATICA: Primary | ICD-10-CM

## 2019-04-23 DIAGNOSIS — M25.552 LEFT HIP PAIN: ICD-10-CM

## 2019-04-23 DIAGNOSIS — M16.12 OSTEOARTHRITIS OF LEFT HIP, UNSPECIFIED OSTEOARTHRITIS TYPE: ICD-10-CM

## 2019-04-23 PROCEDURE — 72100 X-RAY EXAM L-S SPINE 2/3 VWS: CPT

## 2019-04-23 PROCEDURE — 96372 THER/PROPH/DIAG INJ SC/IM: CPT

## 2019-04-23 PROCEDURE — 99283 EMERGENCY DEPT VISIT LOW MDM: CPT

## 2019-04-23 PROCEDURE — 6360000002 HC RX W HCPCS: Performed by: PHYSICIAN ASSISTANT

## 2019-04-23 PROCEDURE — 73502 X-RAY EXAM HIP UNI 2-3 VIEWS: CPT

## 2019-04-23 PROCEDURE — 6370000000 HC RX 637 (ALT 250 FOR IP): Performed by: PHYSICIAN ASSISTANT

## 2019-04-23 RX ORDER — NAPROXEN 500 MG/1
500 TABLET ORAL 2 TIMES DAILY WITH MEALS
Qty: 60 TABLET | Refills: 0 | Status: SHIPPED | OUTPATIENT
Start: 2019-04-23 | End: 2020-05-20 | Stop reason: ALTCHOICE

## 2019-04-23 RX ORDER — TRAMADOL HYDROCHLORIDE 50 MG/1
50 TABLET ORAL ONCE
Status: COMPLETED | OUTPATIENT
Start: 2019-04-23 | End: 2019-04-23

## 2019-04-23 RX ORDER — PREDNISONE 20 MG/1
20 TABLET ORAL 2 TIMES DAILY
Qty: 10 TABLET | Refills: 0 | Status: SHIPPED | OUTPATIENT
Start: 2019-04-23 | End: 2019-04-28

## 2019-04-23 RX ORDER — CYCLOBENZAPRINE HCL 10 MG
10 TABLET ORAL 3 TIMES DAILY PRN
Qty: 15 TABLET | Refills: 0 | Status: SHIPPED | OUTPATIENT
Start: 2019-04-23 | End: 2019-04-28

## 2019-04-23 RX ORDER — CYCLOBENZAPRINE HCL 10 MG
10 TABLET ORAL ONCE
Status: COMPLETED | OUTPATIENT
Start: 2019-04-23 | End: 2019-04-23

## 2019-04-23 RX ORDER — METHYLPREDNISOLONE SODIUM SUCCINATE 125 MG/2ML
80 INJECTION, POWDER, LYOPHILIZED, FOR SOLUTION INTRAMUSCULAR; INTRAVENOUS ONCE
Status: COMPLETED | OUTPATIENT
Start: 2019-04-23 | End: 2019-04-23

## 2019-04-23 RX ADMIN — TRAMADOL HYDROCHLORIDE 50 MG: 50 TABLET, FILM COATED ORAL at 22:37

## 2019-04-23 RX ADMIN — CYCLOBENZAPRINE HYDROCHLORIDE 10 MG: 10 TABLET, FILM COATED ORAL at 23:32

## 2019-04-23 RX ADMIN — METHYLPREDNISOLONE SODIUM SUCCINATE 80 MG: 125 INJECTION, POWDER, FOR SOLUTION INTRAMUSCULAR; INTRAVENOUS at 22:37

## 2019-04-23 ASSESSMENT — ENCOUNTER SYMPTOMS
COLOR CHANGE: 0
SORE THROAT: 0
EYE REDNESS: 0
COUGH: 0
DIARRHEA: 0
EYE DISCHARGE: 0
VOMITING: 0
ABDOMINAL PAIN: 0
RHINORRHEA: 0
WHEEZING: 0
NAUSEA: 0
CONSTIPATION: 0
SHORTNESS OF BREATH: 0

## 2019-04-23 ASSESSMENT — PAIN DESCRIPTION - DESCRIPTORS: DESCRIPTORS: ACHING

## 2019-04-23 ASSESSMENT — PAIN DESCRIPTION - ORIENTATION: ORIENTATION: LEFT

## 2019-04-23 ASSESSMENT — PAIN DESCRIPTION - LOCATION: LOCATION: HIP

## 2019-04-23 ASSESSMENT — PAIN SCALES - GENERAL
PAINLEVEL_OUTOF10: 10
PAINLEVEL_OUTOF10: 10

## 2019-04-23 ASSESSMENT — PAIN DESCRIPTION - FREQUENCY: FREQUENCY: CONTINUOUS

## 2019-04-23 ASSESSMENT — PAIN DESCRIPTION - PAIN TYPE: TYPE: ACUTE PAIN

## 2019-04-24 ENCOUNTER — TELEPHONE (OUTPATIENT)
Dept: FAMILY MEDICINE CLINIC | Age: 54
End: 2019-04-24

## 2019-04-24 NOTE — ED TRIAGE NOTES
Pt comes to the ED with left hip pain. Pt has been going to the chiropractor for the past 3 weeks for this and today his pain is extreme. Pt denies any injury.

## 2019-04-24 NOTE — ED PROVIDER NOTES
Psychiatric/Behavioral: Negative for agitation, confusion, dysphoric mood and suicidal ideas. The patient is not nervous/anxious. PAST MEDICAL HISTORY     Past Medical History:   Diagnosis Date    Anxiety attacks     Anxiety due to family issues.  Arthritis     Asthma     as child    Atypical chest pain     Cuevas esophagus     Chronic back pain     Coronary artery disease     S/P stent of the LAD by Dr. Swapnil Puentes on 2 23 2010.  Erectile dysfunction     Family history of premature CAD     Gastroesophageal reflux disease     Groin hematoma     Herniated disc 2011    back and neck    History of erectile dysfunction 2008    History of giardia infection     History of Giardia.  History of iron deficiency     History of pulmonary embolus (PE) 1980's. Remote history of pulmonary embolus and negative computed tomography for pulmonary embolus.  History of smoking     Hyperlipidemia     Well managed.  Hypogonadism, male     The patient has central hypogonadism.  Joint pain     Morbid obesity     S/P angioplasty     S/P PTCA: 1/12/2015: FFR-guided stenting of distal and mid-LAD using Xience Alpine 2.5X15, post-2.77, and Alpine 2.75X18 mm, post-3.18 mm.  1/12/2015 1/12/2015: FFR-guided stenting of distal and mid-LAD using Xience Alpine 2.5 X 15 mm, post-dilated to 2.77 mm, and Xience Alpine 2.75 X 18 mm, post-dilated to 3.18 mm. Dr. Delmy Owusu   2/22/2010: Stenting of distal LAD using Xience 2.5 X 15 overlapping distally with Xience 2.5 X 12 mm, Dr. Damian Coleman Testosterone deficiency     Thyroid nodule     The patient is euthyroid.  Unspecified sleep apnea        SURGICALHISTORY      has a past surgical history that includes back surgery; Carpal tunnel release (1980); Elbow surgery (1980); knee surgery (1980); Appendectomy; Coronary angioplasty; cardiovascular stress test (3 26 2010); Diagnostic Cardiac Cath Lab Procedure (2 22 2010); lymph node biopsy (2011);  Spine surgery Smoking status: Former Smoker     Types: Cigarettes     Last attempt to quit: 8/3/2002     Years since quittin.7    Smokeless tobacco: Current User     Types: Chew    Tobacco comment: Patient states, \"he chews tobacco.\"    Substance and Sexual Activity    Alcohol use: Yes     Alcohol/week: 0.0 oz     Comment: Patient states, \"seldom. \"    Drug use: No    Sexual activity: Yes   Lifestyle    Physical activity:     Days per week: Not on file     Minutes per session: Not on file    Stress: Not on file   Relationships    Social connections:     Talks on phone: Not on file     Gets together: Not on file     Attends Hinduism service: Not on file     Active member of club or organization: Not on file     Attends meetings of clubs or organizations: Not on file     Relationship status: Not on file    Intimate partner violence:     Fear of current or ex partner: Not on file     Emotionally abused: Not on file     Physically abused: Not on file     Forced sexual activity: Not on file   Other Topics Concern    Not on file   Social History Narrative    Not on file       PHYSICAL EXAM     INITIAL VITALS:  height is 6' (1.829 m) and weight is 300 lb (136.1 kg). His oral temperature is 98 °F (36.7 °C). His blood pressure is 137/68 and his pulse is 104. His respiration is 18 and oxygen saturation is 96%. Physical Exam   Constitutional: He is oriented to person, place, and time. He appears well-developed and well-nourished. No distress. HENT:   Head: Normocephalic and atraumatic. Right Ear: External ear normal.   Left Ear: External ear normal.   Nose: Nose normal.   Mouth/Throat: Oropharynx is clear and moist.   Eyes: Pupils are equal, round, and reactive to light. Conjunctivae and EOM are normal. Right eye exhibits no discharge. Left eye exhibits no discharge. No scleral icterus. Neck: Normal range of motion. Neck supple. Cardiovascular: Normal rate, regular rhythm and normal heart sounds.  Exam reveals no erythema. No pallor. Psychiatric: He has a normal mood and affect. His behavior is normal. Thought content normal.   Nursing note and vitals reviewed. DIFFERENTIAL DIAGNOSIS:   Includes but not limited to: chronic pain, arthritis, strain, sprain, paraspinal strain vs sprain vs paraspinal muscle spasm, herniated disc, sciatica, degenerative disc disease, radicular pain. Low suspicion for: vertebral subluxation or fracture, cauda equina, discitis, epidural abscess, or central canal compromise based on history and physical exam.    DIAGNOSTIC RESULTS     EKG: All EKG's are interpreted by the Emergency Department Physician who eithersigns or Co-signs this chart in the absence of a cardiologist.    None     RADIOLOGY: non-plainfilm images(s) such as CT, Ultrasound and MRI are read by the radiologist.  Plain radiographic images are visualized and preliminarily interpreted by the emergency physician unless otherwise stated below. XR LUMBAR SPINE (2-3 VIEWS)   Final Result   Chronic changes of the lumbar and lower thoracic spine segments. Further assessment with MRI can be considered if acute fracture changes are suspected. **This report has been created using voice recognition software. It may contain minor errors which are inherent in voice recognition technology. **      Final report electronically signed by Dr. Adelina Holt on 4/23/2019 10:47 PM      XR HIP LEFT (2-3 VIEWS)   Final Result   1. Negative exam for left hip fracture or dislocation. 2. Mild degenerative arthropathy present. .               **This report has been created using voice recognition software. It may contain minor errors which are inherent in voice recognition technology. **      Final report electronically signed by Dr. Adelina Holt on 4/23/2019 10:37 PM            LABS:   Labs Reviewed - No data to display    EMERGENCY DEPARTMENT COURSE:   Vitals:    Vitals:    04/23/19 2140   BP: 137/68   Pulse: 104   Resp: 18   Temp: 98 °F (36.7 °C)   TempSrc: Oral   SpO2: 96%   Weight: 300 lb (136.1 kg)   Height: 6' (1.829 m)     MDM  The patient was seen and evaluated within the ED today with left lower back and left hip pain. Within the department, I observed the patient's vital signs to be within acceptable range. On exam, I appreciated no midline spinal or paraspinal tenderness of the cervical, thoracic, or lumbar spine. There is no tenderness of the left hip, but the patient does have mildly decreased range of motion and strength of the left hip secondary to his pain. Patient has full ROM and strength of the lower extremities. There is intact sensation of soft touch in the lower extremities. The lower extremities appear to be neurovascularly intact. Radiologic studies within the department revealed degenerative changes but no acute fracture, dislocation, or soft tissue abnormality. Within the department, the patient was treated with Tramadol, Solumedrol, and Flexeril. I observed the patient's condition to improve during the duration of the stay. I explained my proposed course of treatment to the patient, who was amenable to my treatment and discharge decisions. He was discharged home in stable condition with prescriptions for EC Naprosyn, Prednisone, Flexeril, and the patient will return to the ED if the symptoms become more severe in nature or otherwise change. I discussed with the patient that Flexeril is sedating and advised the patient to caution while driving, operating heavy machinery, or drinking alcohol. Patient vocalized understanding. CRITICAL CARE:   None    CONSULTS:  None    PROCEDURES:  None    FINAL IMPRESSION     1. Chronic left-sided low back pain without sciatica    2. Left hip pain    3.  Osteoarthritis of left hip, unspecified osteoarthritis type          DISPOSITION/PLAN   I have given the patient strict written and verbal instructions about care at home, follow-up, and signs and symptoms of worsening of condition, and the patient did verbalize understanding of these instructions. Patient was discharged in stable condition. Will return if symptoms change or worsen, or for any sign or symptom deemed emergent by the patient or family members. Follow up as an outpatient or sooner if symptoms warrant. PATIENT REFERREDTO:  Abdiel Mckenna 5, [address]  [phone]    Call in 3 days  As needed, If symptoms worsen      DISCHARGE MEDICATIONS:  Discharge Medication List as of 4/23/2019 11:22 PM      START taking these medications    Details   naproxen (EC-NAPROSYN) 500 MG EC tablet Take 1 tablet by mouth 2 times daily (with meals), Disp-60 tablet, R-0Print      predniSONE (DELTASONE) 20 MG tablet Take 1 tablet by mouth 2 times daily for 5 days, Disp-10 tablet, R-0Print      cyclobenzaprine (FLEXERIL) 10 MG tablet Take 1 tablet by mouth 3 times daily as needed for Muscle spasms, Disp-15 tablet, R-0Print             (Please note that portions of this note were completed with a voice recognition program.  Efforts were made to edit the dictations but occasionally words are mis-transcribed.)    Scribe:  Rola Ramos 4/23/19 10:21 PM Scribing for and in the presence of Ester Card PA-C. Signed by: Darling Luke(Name), Zaheer, 04/25/19 9:08 AM    Provider:  I personally performed the services described in the documentation,reviewed and edited the documentation which was dictated to the scribe in my presence, and it accurately records my words and actions.     Ester Card PA-C 04/25/19 9:08 AM    JEFFY Trevino PA-C  04/25/19 [id number]

## 2019-04-25 ASSESSMENT — ENCOUNTER SYMPTOMS: BACK PAIN: 1

## 2019-05-02 ENCOUNTER — INITIAL CONSULT (OUTPATIENT)
Dept: PULMONOLOGY | Age: 54
End: 2019-05-02
Payer: COMMERCIAL

## 2019-05-02 VITALS
OXYGEN SATURATION: 96 % | WEIGHT: 315 LBS | HEART RATE: 84 BPM | RESPIRATION RATE: 14 BRPM | HEIGHT: 72 IN | SYSTOLIC BLOOD PRESSURE: 130 MMHG | BODY MASS INDEX: 42.66 KG/M2 | DIASTOLIC BLOOD PRESSURE: 76 MMHG

## 2019-05-02 DIAGNOSIS — G47.33 OSA TREATED WITH BIPAP: Primary | ICD-10-CM

## 2019-05-02 DIAGNOSIS — E66.01 CLASS 3 SEVERE OBESITY DUE TO EXCESS CALORIES WITH SERIOUS COMORBIDITY AND BODY MASS INDEX (BMI) OF 40.0 TO 44.9 IN ADULT (HCC): ICD-10-CM

## 2019-05-02 DIAGNOSIS — I10 ESSENTIAL HYPERTENSION: ICD-10-CM

## 2019-05-02 DIAGNOSIS — I25.10 CORONARY ARTERY DISEASE INVOLVING NATIVE HEART WITHOUT ANGINA PECTORIS, UNSPECIFIED VESSEL OR LESION TYPE: ICD-10-CM

## 2019-05-02 PROCEDURE — 99204 OFFICE O/P NEW MOD 45 MIN: CPT | Performed by: NURSE PRACTITIONER

## 2019-05-02 NOTE — PROGRESS NOTES
[facility]    Patient: Andrey Yeboah, 62 y.o.   : 1965    Pt of . Former Esequiel Mccauley   Patient presents with    New Patient     Sleep consult, former [employer] patient,         VERONIKA  Rand Salinas is here for re-establishment of care to resume treatment for KIRAN. He was diagnosed in 2012 with AHI of 16.8 (REM 50.7; Supine 70) and started on BiPAP of 13/ which at some point was increased to 15/11. He last saw Dr. Rowan Brown in  and stopped treatment that year when he could no longer get supplies/ His weight at that time was 290 lbs, currently 327 lbs, up 37 lbs. He follows with Dr. Sophia Borjas with Cardiology for CAD and was referred for follow up evaluation/treatment. Sleep Hx   SLEEP HISTORY    Sleep Symptoms  This has been evaluated or treated before. Sleep related complaints include snoring, periods of not breathing, tossing and turning, decreased concentration, excessive daytime sleepiness, difficulty falling asleep once awakened as well. Rand Salinas denies any history of seizures, sleep walking or talking and there is no history suggestive of bruxism or restless leg syndrome. Bedtime Narative  He goes to bed at 11 PM and wakes up at 6 AM. Rand Salinas reports that this is  different on the weekends. Most of the time, it takes him \"awhile\" to fall asleep with difficulty falling back to sleep if he awakens, usually because he has to go to the bathroom. Nap History  Rand Salinas does take naps very often. If he does, they are helpful. Snoring History  Harrydoes snore or has been told he snores. There have been witnessed apneas. He does awakenwith choking or gasping. Haris Dinmingo does not have recurring dreams, and specifically does not have episodes of feeling paralyzed while awake, or anything to suggest cataplexy or dreams he would describe as hallucinations.     Driving History  Rand Salinas does not report sleepiness while driving. There have not been driving accidents or near-misses related to sleepiness, fatigue or inattention. Weight Issues  There has been a change in his weight over the past 9 years of about 37 lbs. Caffeine Intake  Dina Powers does drink caffeine, usually about  5 cans per day. Employment History  Dina Powers works. The work hours are generally day shift and there has notbeen any recent changes in the shifts or hours. Family Sleep History  There are notfamily members with a history of sleep problems. Download     PAP Download: Not used PAP machine for 3-4 years  Original or initial  AHI: 16.8     Date of initial study: 7-13-12    Neck Size: 17 in   Mallampati III  ESS: 12  SAQLI: 53      Past Medical hx     PMH:  Past Medical History:   Diagnosis Date    Anxiety attacks     Anxiety due to family issues.  Arthritis     Asthma     as child    Atypical chest pain     Cuevas esophagus     Chronic back pain     Coronary artery disease     S/P stent of the LAD by Dr. Kellie Bower on 2 23 2010.  Erectile dysfunction     Family history of premature CAD     Gastroesophageal reflux disease     Groin hematoma     Herniated disc 2011    back and neck    History of erectile dysfunction 2008    History of giardia infection     History of Giardia.  History of iron deficiency     History of pulmonary embolus (PE) 1980's. Remote history of pulmonary embolus and negative computed tomography for pulmonary embolus.  History of smoking     Hyperlipidemia     Well managed.  Hypogonadism, male     The patient has central hypogonadism.      Joint pain     Morbid obesity     KIRAN (obstructive sleep apnea)     S/P angioplasty     S/P PTCA: 1/12/2015: FFR-guided stenting of distal and mid-LAD using Xience Alpine 2.5X15, post-2.77, and Alpine 2.75X18 mm, post-3.18 mm.  1/12/2015 1/12/2015: FFR-guided stenting of distal and mid-LAD using Xience Alpine 2.5 X 15 mm, post-dilated to 2.77 mm, and Xience (1.829 m)   Wt (!) 327 lb 9.6 oz (148.6 kg)   SpO2 96% Comment: on RA  BMI 44.43 kg/m²      Physical Exam  General Appearance  Moderately built, moderately nourished, in no acute distress. HEENT - Head is normocephalic, atraumatic. PERRL. Oral mucosa pink and moist, no oral thrush. Mallampati Score - IV (only hard palate visible). Neck - Supple, symmetrical, trachea midline and soft. Lungs - Chest symmetric with normal A/P diameter, normal respiratory rate and rhythm, lungs clear to auscultation, no wheezes, rales or rhonchi, aeration good. Cardiovascular - Heart sounds are normal.  Regular rhythm normal rate without murmur, gallop or rub. Abdomen - Soft, nontender, no-ndistended. Neurologic - Alert and oriented x 3. Skin - No bruising or bleeding. Extremities - No cyanosis, clubbing or edema. Sleep Study   See EPIC  Assessment      Diagnosis Orders   1. KIRAN treated with BiPAP  DME Order for CPAP as OP    DME Order for CPAP as OP   2. Coronary artery disease involving native heart without angina pectoris, unspecified vessel or lesion type     3. Essential hypertension     4. Class 3 severe obesity due to excess calories with serious comorbidity and body mass index (BMI) of 40.0 to 44.9 in adult [facility])          Plan   -Resume BiPAP at 15/11. Arrange for a machine check, mask fit and new supplies.   -He is to call with any problems. - he advised to keep good compliance with current recommended pressure to get optimalresults and clinical improvement  - Recommend 7-9 hours of sleep with PAP  - he was advised to call DME company regarding supplies if needed. - he call my office for earlier appointment if needed for worseningof sleep symptoms.   - he was instructed on weight loss. - Belén Parisi  was educated about my impression and plan. Patient verbalizes understanding. We will see Belén Parisi  back in: 8 weeks with download.     Electronically signed by KANIKA Enriquez CNP on 5/2/2019 at 4:49

## 2019-05-02 NOTE — LETTER
[address] [facility] [address]  [address] [facility]  [address]  Phone: [phone]  Fax: [phone]    KANIKA Elliott CNP        May 2, 2019     Patient: Min Dockery   YOB: 1965   Date of Visit: 5/2/2019       To Whom It May Concern: It is my medical opinion that Bjorn Bowels may return to work May 3, 2019. If you have any questions or concerns, please don't hesitate to call.     Sincerely,        KANIKA Elliott CNP

## 2019-07-08 ENCOUNTER — OFFICE VISIT (OUTPATIENT)
Dept: PULMONOLOGY | Age: 54
End: 2019-07-08
Payer: COMMERCIAL

## 2019-07-08 VITALS
OXYGEN SATURATION: 97 % | BODY MASS INDEX: 42.66 KG/M2 | HEART RATE: 86 BPM | WEIGHT: 315 LBS | HEIGHT: 72 IN | SYSTOLIC BLOOD PRESSURE: 124 MMHG | DIASTOLIC BLOOD PRESSURE: 62 MMHG

## 2019-07-08 DIAGNOSIS — G47.33 OSA TREATED WITH BIPAP: Primary | ICD-10-CM

## 2019-07-08 DIAGNOSIS — E66.01 CLASS 3 SEVERE OBESITY DUE TO EXCESS CALORIES WITH SERIOUS COMORBIDITY AND BODY MASS INDEX (BMI) OF 40.0 TO 44.9 IN ADULT (HCC): ICD-10-CM

## 2019-07-08 DIAGNOSIS — K21.9 GASTROESOPHAGEAL REFLUX DISEASE WITHOUT ESOPHAGITIS: ICD-10-CM

## 2019-07-08 DIAGNOSIS — I10 BENIGN ESSENTIAL HTN: ICD-10-CM

## 2019-07-08 PROCEDURE — 99213 OFFICE O/P EST LOW 20 MIN: CPT | Performed by: NURSE PRACTITIONER

## 2019-07-08 NOTE — PATIENT INSTRUCTIONS
smoking, you improve the health of everyone who now breathes in your smoke. · Their heart, lung, and cancer risks drop, much like yours. · They are sick less. For babies and small children, living smoke-free means they're less likely to have ear infections, pneumonia, and bronchitis. · If you're a woman who is or will be pregnant someday, quitting smoking means a healthier . · Children who are close to you are less likely to become adult smokers. Where can you learn more? Go to https://icanbuypeSuninfo Information.[employer]. org and sign in to your Pledge51 account. Enter [phone] in the [facility] box to learn more about \"Learning About Benefits From Quitting Smoking. \"     If you do not have an account, please click on the \"Sign Up Now\" link. Current as of: 2018  Content Version: 12.0  © 8020-0077 [employer]. Care instructions adapted under license by [facility] ([facility]). If you have questions about a medical condition or this instruction, always ask your healthcare professional. [place] [address] any warranty or liability for your use of this information. Patient Education        Stopping Smokeless Tobacco Use: Care Instructions  Your Care Instructions    Smokeless tobacco comes in many forms, such as snuff and chewing tobacco:  · Snuff is finely ground tobacco sold in cans or pouches. Most of the time, snuff is used by putting a \"pinch\" or \"dip\" between the lower lip or cheek and the gum. · Chewing tobacco is sold as loose leaves, plugs, or twists. It is chewed or placed between the cheek and the gum or teeth. There are plenty of reasons to stop using smokeless tobacco. These products are harmful. They are not risk-free alternatives to smoking. Smokeless tobacco contains nicotine, which is addicting.  Though using smokeless tobacco is less harmful than smoking cigarettes, it can cause serious health problems, such as:  · White patches or red sores in your a better chance of quitting if you have help and support. · Join a support group for people who are trying to quit using smokeless tobacco.  · Set a quit date. Pick your date carefully so that it is not right in the middle of a big deadline or stressful time. After you quit, do not use smokeless tobacco even once. Get rid of all spit cups, cans, and pouches after your last use. Clean your house and your clothes so that they do not smell of tobacco.  · Learn how to be a non-user. Think about ways you can avoid those things that make you reach for tobacco.  ? Learn some ways to deal with cravings, like calling a friend or going for a walk. Cravings often pass. ? Avoid situations that put you at greatest risk for using smokeless tobacco. For some people, it is hard to spend time with friends without dipping or chewing. For others, they might skip a coffee break with coworkers who smoke or use smokeless tobacco.  ? Change your daily routine. Take a different route to work, or eat a meal in a different place. · Cut down on stress. Calm yourself or release tension by doing an activity you enjoy, such as reading a book, taking a hot bath, or gardening. · Talk to your doctor or pharmacist about nicotine replacement therapy. You still get nicotine, but you do not use tobacco. Nicotine replacement products help you slowly reduce the amount of nicotine you need. Many of these products are available over the counter. They include nicotine patches, gum, lozenges, and inhalers. · Ask your doctor about bupropion (Wellbutrin) or varenicline (Chantix), which are prescription medicines. They do not contain nicotine. They help you by reducing withdrawal symptoms, such as stress and anxiety. · Get regular exercise. Having healthy habits will help your body move past its craving for nicotine. · Be prepared to keep trying. Most people are not successful the first few times they try to quit.  Do not get mad at yourself if you use

## 2019-07-08 NOTE — PROGRESS NOTES
Coleen Little         [id number]  7/8/2019   No chief complaint on file. Pt of Skyler Rust    PAP Download:   Original or initial AHI: 16.8    Date of initial study: 07/13/12  Weight of initial study: 290   [x] Compliant  86.7%   []  Noncompliant 13.3%     PAP Type BIPAP   Level  IPAP:15 cmH20, EPAP: 11 cmH20   Avg Hrs/Day 6:11  AHI: 1.5   Recorded compliance dates, 06/05/19  to 07/04/19   Machine/Mfg: BiPAP Auto Interface: FFM    Provider:  [x][facility]  [][facility]  [][facility]  [][facility]         [][facility] []Other:     Neck Size: 17  Mallampati Mallampati 4  ESS:  7    Here is a scan of the most recent download:              Presentation:   Eric Baron presents for sleep medicine follow up for obstructive sleep apnea. Since the last visit, Eric Baron was set up with new supplies, has resumed treatment and is doing well with notable benefit with treatment. No new medical issues. BP and GERD symptoms well controlled. Improved sinus congestion. Purchased SoClean. Equipment issues: The pressure is  acceptable, the mask is acceptable and he  is  using the humidity. Sleep issues:  Do you feel better? Yes  More rested? Yes   Better concentration? yes    Progress History:   Since last visit any new medical issues? No  New ER or hospitlal visits? No  Any new or changes in medicines? No  Any new sleep medicines? No      Past Medical History:   Diagnosis Date    Anxiety attacks     Anxiety due to family issues.  Arthritis     Asthma     as child    Atypical chest pain     Cuevas esophagus     Chronic back pain     Coronary artery disease     S/P stent of the LAD by Dr. Tomy Moon on 2 23 2010.  Erectile dysfunction     Family history of premature CAD     Gastroesophageal reflux disease     Groin hematoma     Herniated disc 2011    back and neck    History of erectile dysfunction 2008    History of giardia infection     History of Giardia.      History of iron deficiency     History of pulmonary embolus (PE) . Remote history of pulmonary embolus and negative computed tomography for pulmonary embolus.  History of smoking     Hyperlipidemia     Well managed.  Hypogonadism, male     The patient has central hypogonadism.  Joint pain     Morbid obesity     KIRAN (obstructive sleep apnea)     S/P angioplasty     S/P PTCA: 2015: FFR-guided stenting of distal and mid-LAD using Xience Alpine 2.5X15, post-2.77, and Alpine 2.75X18 mm, post-3.18 mm.  2015: FFR-guided stenting of distal and mid-LAD using Xience Alpine 2.5 X 15 mm, post-dilated to 2.77 mm, and Xience Alpine 2.75 X 18 mm, post-dilated to 3.18 mm. Dr. Michelle Davis   2010: Stenting of distal LAD using Xience 2.5 X 15 overlapping distally with Xience 2.5 X 12 mm, Dr. Monae Cash Testosterone deficiency     Thyroid nodule     The patient is euthyroid. Past Surgical History:   Procedure Laterality Date    APPENDECTOMY      BACK SURGERY      X 2 FOR HERNIATED DISCS    CARDIOVASCULAR STRESS TEST  3 26 2010    Mild chest discomfort induced by IV Lexiscan that resolved spontaneously. No arrhythmias. No EKG changes to suggest ischemia.  CARPAL TUNNEL RELEASE      COLONOSCOPY      CORONARY ANGIOPLASTY      X 2 STENTS    DIAGNOSTIC CARDIAC CATH LAB PROCEDURE  2010    Successful drug-eluting stent x 2 of mid LAD. LV borderline normal LV function. EF 50-55%. No wall motion abnormalities detected and no MR. Normal hemodynamics. Coronaries - diffuse left anterior descending (LAD) disease with proximal 50% and distal around 70%.     [facility]    right    KNEE SURGERY      left    LYMPH NODE BIOPSY  2011    neck    [address] [facility], 2000    UPPER GASTROINTESTINAL ENDOSCOPY  2013       Social History     Tobacco Use    Smoking status: Former Smoker     Types: Cigarettes     Last attempt to quit: 8/3/2002     Years since quittin.9    Smokeless tobacco: Current User     Types: Benedetta Derrick respiratory tract/ lungs: No cough or sputum production. No hemoptysis. Cardiovascular: No palpitations or chest pain. Gastrointestinal: No nausea or vomiting. Neurological: No focal neurologiacal weakness. Extremities: No edema. Musculoskeletal: No complaints. Genitourinary: No complaints. Hematological: Negative. Psychiatric/Behavioral: Negative. Skin: No itching. Physical Exam:    BMI:  Body mass index is 44.43 kg/m². Wt Readings from Last 3 Encounters:   07/08/19 (!) 327 lb 9.6 oz (148.6 kg)   05/02/19 (!) 327 lb 9.6 oz (148.6 kg)   04/23/19 300 lb (136.1 kg)     Weight stable / unchanged  Vitals: /62 (Site: Left Upper Arm, Position: Sitting, Cuff Size: Medium Adult)   Pulse 86   Ht 6' (1.829 m)   Wt (!) 327 lb 9.6 oz (148.6 kg)   SpO2 97% Comment: on RA  BMI 44.43 kg/m²         General Appearance Moderately built, moderately nourished,  in no acute distress  HEENT - Head is normocephalic, atraumatic. PERRL. Oral mucosa pink and moist, no oral thrush. Mallampati Score - IV (only hard palate visible). Neck - Supple, symmetrical, trachea midline and soft. Lungs - Chest symmetric with normal A/P diameter, normal respiratory rate and rhythm, lungs clear to auscultation, no wheezes, rales or rhonchi, aeration good. Cardiovascular - Heart sounds are normal. Regular rhythm normal rate without murmur, gallop or rub. Abdomen - Soft, nontender, non-distended. Neurologic - Alert and oriented x 3. Skin - No bruising or bleeding. Extremities - No cyanosis, clubbing or edema. ASSESSMENT/DIAGNOSIS     Diagnosis Orders   1. KIRAN treated with BiPAP     2. Benign essential HTN     3. Gastroesophageal reflux disease without esophagitis     4. Class 3 severe obesity due to excess calories with serious comorbidity and body mass index (BMI) of 40.0 to 44.9 in adult [facility])            Plan   Do you need any equipment today?  No.    - He  was advised to continue current positive airway pressure therapy with above described pressure. - He  advised to keep goodcompliance with current recommended pressure to get optimal results and clinical improvement.  - Recommend 7-9 hours of sleep with PAP treatment. - He was advised to call [employer] company regarding supplies if needed.   -He call my office for earlier appointment if needed for worsening of sleep symptoms.   - He was instructed on weight loss. - Katie Ruiz was educated about my impression and plan. Patient verbalizes understanding. We will see Silver Shall back in: 1 year with download.     .Electronically signed by KANIKA Sheehan CNP on 7/8/2019 at 3:26 PM'

## 2019-10-16 RX ORDER — PANTOPRAZOLE SODIUM 40 MG/1
TABLET, DELAYED RELEASE ORAL
Qty: 180 TABLET | Refills: 2 | Status: SHIPPED | OUTPATIENT
Start: 2019-10-16 | End: 2020-06-18 | Stop reason: SDUPTHER

## 2019-10-16 RX ORDER — METOPROLOL SUCCINATE 25 MG/1
TABLET, EXTENDED RELEASE ORAL
Qty: 180 TABLET | Refills: 2 | Status: SHIPPED | OUTPATIENT
Start: 2019-10-16 | End: 2020-06-18 | Stop reason: SDUPTHER

## 2019-11-20 RX ORDER — ATORVASTATIN CALCIUM 40 MG/1
TABLET, FILM COATED ORAL
Qty: 90 TABLET | Refills: 2 | Status: SHIPPED | OUTPATIENT
Start: 2019-11-20 | End: 2020-06-18 | Stop reason: SDUPTHER

## 2020-05-20 ENCOUNTER — APPOINTMENT (OUTPATIENT)
Dept: GENERAL RADIOLOGY | Age: 55
End: 2020-05-20
Payer: COMMERCIAL

## 2020-05-20 ENCOUNTER — HOSPITAL ENCOUNTER (EMERGENCY)
Age: 55
Discharge: HOME | End: 2020-05-20
Payer: COMMERCIAL

## 2020-05-20 VITALS
HEIGHT: 72 IN | OXYGEN SATURATION: 97 % | SYSTOLIC BLOOD PRESSURE: 135 MMHG | TEMPERATURE: 97.3 F | WEIGHT: 315 LBS | DIASTOLIC BLOOD PRESSURE: 69 MMHG | RESPIRATION RATE: 18 BRPM | HEART RATE: 79 BPM | BODY MASS INDEX: 42.66 KG/M2

## 2020-05-20 PROCEDURE — 99213 OFFICE O/P EST LOW 20 MIN: CPT

## 2020-05-20 PROCEDURE — 73030 X-RAY EXAM OF SHOULDER: CPT

## 2020-05-20 PROCEDURE — 99213 OFFICE O/P EST LOW 20 MIN: CPT | Performed by: NURSE PRACTITIONER

## 2020-05-20 RX ORDER — NAPROXEN 500 MG/1
500 TABLET ORAL 2 TIMES DAILY WITH MEALS
Qty: 14 TABLET | Refills: 0 | Status: SHIPPED | OUTPATIENT
Start: 2020-05-20 | End: 2021-05-27

## 2020-05-20 ASSESSMENT — ENCOUNTER SYMPTOMS
ALLERGIC/IMMUNOLOGIC NEGATIVE: 1
SHORTNESS OF BREATH: 0
COLOR CHANGE: 0
BACK PAIN: 0

## 2020-05-20 ASSESSMENT — PAIN DESCRIPTION - ORIENTATION: ORIENTATION: LEFT

## 2020-05-20 ASSESSMENT — PAIN DESCRIPTION - PAIN TYPE: TYPE: ACUTE PAIN

## 2020-05-20 ASSESSMENT — PAIN SCALES - GENERAL: PAINLEVEL_OUTOF10: 5

## 2020-05-20 ASSESSMENT — PAIN DESCRIPTION - DESCRIPTORS: DESCRIPTORS: SHARP

## 2020-05-20 ASSESSMENT — PAIN DESCRIPTION - FREQUENCY: FREQUENCY: CONTINUOUS

## 2020-05-20 ASSESSMENT — PAIN DESCRIPTION - LOCATION: LOCATION: SHOULDER

## 2020-05-20 NOTE — LETTER
[address] [facility]  [facility] [address]  Phone: [phone]               May 20, 2020    Patient: Red Chavira   YOB: 1965   Date of Visit: 5/20/2020       To Whom It May Concern:    Santos Roth was seen and treated in our emergency department on 5/20/2020. He may return to work on 5-.       Sincerely,       KANIKA Lester CNP         Signature:___Electronically signed by KANIKA Lester CNP on 5/20/2020 at 9:10 AM_______________________________

## 2020-05-20 NOTE — ED PROVIDER NOTES
disease     S/P stent of the LAD by Dr. Ita Stevenson on 2 23 2010.  Erectile dysfunction     Family history of premature CAD     Gastroesophageal reflux disease     Groin hematoma     Herniated disc 2011    back and neck    History of erectile dysfunction 2008    History of giardia infection     History of Giardia.  History of iron deficiency     History of pulmonary embolus (PE) 1980's. Remote history of pulmonary embolus and negative computed tomography for pulmonary embolus.  History of smoking     Hyperlipidemia     Well managed.  Hypogonadism, male     The patient has central hypogonadism.  Joint pain     Morbid obesity     KIRAN (obstructive sleep apnea)     S/P angioplasty     S/P PTCA: 1/12/2015: FFR-guided stenting of distal and mid-LAD using Xience Alpine 2.5X15, post-2.77, and Alpine 2.75X18 mm, post-3.18 mm.  1/12/2015 1/12/2015: FFR-guided stenting of distal and mid-LAD using Xience Alpine 2.5 X 15 mm, post-dilated to 2.77 mm, and Xience Alpine 2.75 X 18 mm, post-dilated to 3.18 mm. Dr. Itz Friend   2/22/2010: Stenting of distal LAD using Xience 2.5 X 15 overlapping distally with Xience 2.5 X 12 mm, Dr. Alejandro West Testosterone deficiency     Thyroid nodule     The patient is euthyroid. SURGICAL HISTORY     Patient  has a past surgical history that includes back surgery; Carpal tunnel release (1980); Elbow surgery (1980); knee surgery (1980); Appendectomy; Coronary angioplasty; cardiovascular stress test (3 26 2010); Diagnostic Cardiac Cath Lab Procedure (2 22 2010); lymph node biopsy (2011); Spine surgery (1993, 2000); Upper gastrointestinal endoscopy (2013); and Colonoscopy (2013). CURRENT MEDICATIONS       Previous Medications    ASPIRIN 81 MG TABLET    Take 81 mg by mouth daily. ATORVASTATIN (LIPITOR) 40 MG TABLET    TAKE 1 TABLET EVERY OTHER DAY    CPAP MACHINE MISC    by Does not apply route Please check patient CPAP machine for proper functioning.  To be set at [facility] 15/11 cwp. CYANOCOBALAMIN (VITAMIN B 12 PO)    Take 1,000 mg by mouth. METOPROLOL SUCCINATE (TOPROL XL) 25 MG EXTENDED RELEASE TABLET    TAKE 1 TABLET TWICE A DAY    MULTIPLE VITAMINS-MINERALS (THERAPEUTIC MULTIVITAMIN-MINERALS) TABLET    Take 1 tablet by mouth daily    NITROGLYCERIN (NITROSTAT) 0.4 MG SL TABLET    Place 1 tablet under the tongue every 5 minutes as needed for Chest pain    PANTOPRAZOLE (PROTONIX) 40 MG TABLET    TAKE 1 TABLET TWICE A DAY       ALLERGIES     Patient is has No Known Allergies. FAMILY HISTORY     Patient'sfamily history includes COPD in his mother; Cancer in his father and mother; Coronary Art Dis in his father and mother; Heart Disease in his father and mother. SOCIAL HISTORY     Patient  reports that he quit smoking about 17 years ago. His smoking use included cigarettes. He has a 14.00 pack-year smoking history. His smokeless tobacco use includes chew. He reports current alcohol use. He reports that he does not use drugs. PHYSICAL EXAM     ED TRIAGE VITALS  BP: 135/69, Temp: 97.3 °F (36.3 °C), Pulse: 79, Resp: 18, SpO2: 97 %  Physical Exam  Vitals signs and nursing note reviewed. Constitutional:       General: He is not in acute distress. Appearance: Normal appearance. He is well-developed and well-groomed. He is not ill-appearing, toxic-appearing or diaphoretic. HENT:      Head: Normocephalic and atraumatic. Right Ear: Hearing normal.      Left Ear: Hearing normal.      Mouth/Throat:      Lips: Pink. Eyes:      General: Lids are normal.      Conjunctiva/sclera:      Right eye: Right conjunctiva is not injected. Left eye: Left conjunctiva is not injected. Pupils: Pupils are equal.   Neck:      Musculoskeletal: Normal range of motion. Cardiovascular:      Rate and Rhythm: Normal rate and regular rhythm. Pulses: Normal pulses. Radial pulses are 2+ on the left side.    Pulmonary:      Effort: Pulmonary effort is normal. Problem List Items Addressed This Visit     None      Visit Diagnoses     Left shoulder strain, initial encounter    -  Primary    Fall from ground level              PATIENT REFERRED TO:  Chriss Cool [address]  [address] [facility] [phone]  Go today  Walk-in 7am-5 pm, 4pm Fridays, For appointment       Fatoumata Gutiérrez, APRN - [address] [facility], [facility]  05/20/20 [id number]

## 2020-05-21 ENCOUNTER — CARE COORDINATION (OUTPATIENT)
Dept: CARE COORDINATION | Age: 55
End: 2020-05-21

## 2020-05-22 NOTE — CARE COORDINATION
Declined Loop enrollment  Following up with [facility]    Patient contacted regarding recent discharge and COVID-19 risk   Care Transition Nurse/ Ambulatory Care Manager contacted the patient by telephone to perform post discharge assessment. Verified name and  with patient as identifiers. Patient has following risk factors of: UC visit, other chronic conditions. CTN/ACM reviewed discharge instructions, medical action plan and red flags related to discharge diagnosis. Reviewed and educated them on any new and changed medications related to discharge diagnosis. Advised obtaining a 90-day supply of all daily and as-needed medications. Education provided regarding infection prevention, and signs and symptoms of COVID-19 and when to seek medical attention with patient who verbalized understanding. Discussed exposure protocols and quarantine from [address] you at higher risk for severe illness  and given an opportunity for questions and concerns. The patient agrees to contact the COVID-19 hotline [phone] or PCP office for questions related to their healthcare. CTN/ACM provided contact information for future reference. From CDC: Are you at higher risk for severe illness?  Wash your hands often.  Avoid close contact (6 feet, which is about two arm lengths) with people who are sick.  Put distance between yourself and other people if COVID-19 is spreading in your community.  Clean and disinfect frequently touched surfaces.  Avoid all cruise travel and non-essential air travel.  Call your healthcare professional if you have concerns about COVID-19 and your underlying condition or if you are sick. For more information on steps you can take to protect yourself, see CDC's How to [address] [place] [address] for follow-up call in 7-14 days based on severity of symptoms and risk factors.

## 2020-06-05 ENCOUNTER — CARE COORDINATION (OUTPATIENT)
Dept: CARE COORDINATION | Age: 55
End: 2020-06-05

## 2020-06-05 NOTE — CARE COORDINATION
You Patient resolved from the Care Transitions episode on 6-5-20  Discussed COVID-19 related testing which was not done at this time. Test results were not done. Patient informed of results, if available? No    Patient/family has been provided the following resources and education related to COVID-19:                         Signs, symptoms and red flags related to COVID-19            CDC exposure and quarantine guidelines            [facility] exposure contact - [phone]            Contact for their local Department of Health                 Patient currently reports that the following symptoms have improved:  no new/worsening symptoms     No further outreach scheduled with this CTN/ACM. Episode of Care resolved. Patient has this CTN/ACM contact information if future needs arise.

## 2020-06-17 ENCOUNTER — TELEPHONE (OUTPATIENT)
Dept: CARDIOLOGY CLINIC | Age: 55
End: 2020-06-17

## 2020-06-18 ENCOUNTER — OFFICE VISIT (OUTPATIENT)
Dept: CARDIOLOGY CLINIC | Age: 55
End: 2020-06-18
Payer: COMMERCIAL

## 2020-06-18 VITALS
SYSTOLIC BLOOD PRESSURE: 138 MMHG | DIASTOLIC BLOOD PRESSURE: 72 MMHG | HEIGHT: 72 IN | WEIGHT: 315 LBS | BODY MASS INDEX: 42.66 KG/M2

## 2020-06-18 PROCEDURE — 99213 OFFICE O/P EST LOW 20 MIN: CPT | Performed by: NUCLEAR MEDICINE

## 2020-06-18 PROCEDURE — 93000 ELECTROCARDIOGRAM COMPLETE: CPT | Performed by: NUCLEAR MEDICINE

## 2020-06-18 RX ORDER — PANTOPRAZOLE SODIUM 40 MG/1
TABLET, DELAYED RELEASE ORAL
Qty: 180 TABLET | Refills: 3 | Status: SHIPPED | OUTPATIENT
Start: 2020-06-18 | End: 2021-06-14

## 2020-06-18 RX ORDER — ATORVASTATIN CALCIUM 40 MG/1
TABLET, FILM COATED ORAL
Qty: 90 TABLET | Refills: 3 | Status: SHIPPED | OUTPATIENT
Start: 2020-06-18 | End: 2021-07-20

## 2020-06-18 RX ORDER — PENICILLIN V POTASSIUM 500 MG/1
500 TABLET ORAL 4 TIMES DAILY
COMMUNITY
End: 2020-07-09

## 2020-06-18 RX ORDER — METOPROLOL SUCCINATE 25 MG/1
TABLET, EXTENDED RELEASE ORAL
Qty: 180 TABLET | Refills: 3 | Status: SHIPPED | OUTPATIENT
Start: 2020-06-18 | End: 2021-06-14

## 2020-06-18 NOTE — PROGRESS NOTES
penicillin v potassium (VEETID) 500 MG tablet Take 500 mg by mouth 4 times daily      naproxen (NAPROSYN) 500 MG tablet Take 1 tablet by mouth 2 times daily (with meals) for 7 days 14 tablet 0    atorvastatin (LIPITOR) 40 MG tablet TAKE 1 TABLET EVERY OTHER DAY 90 tablet 2    pantoprazole (PROTONIX) 40 MG tablet TAKE 1 TABLET TWICE A  tablet 2    metoprolol succinate (TOPROL XL) 25 MG extended release tablet TAKE 1 TABLET TWICE A  tablet 2    CPAP Machine MISC by Does not apply route Please check patient CPAP machine for proper functioning. To be set at BiPAP 15/11 cwp. 1 each 0    nitroGLYCERIN (NITROSTAT) 0.4 MG SL tablet Place 1 tablet under the tongue every 5 minutes as needed for Chest pain 25 tablet 3    Multiple Vitamins-Minerals (THERAPEUTIC MULTIVITAMIN-MINERALS) tablet Take 1 tablet by mouth daily      aspirin 81 MG tablet Take 81 mg by mouth daily.  Cyanocobalamin (VITAMIN B 12 PO) Take 1,000 mg by mouth. No current facility-administered medications for this visit.       No Known Allergies  Health Maintenance   Topic Date Due    Hepatitis C screen  1965    Pneumococcal 0-64 years Vaccine (1 of 1 - PPSV23) 01/21/1969    HIV screen  01/21/1978    DTaP/Tdap/Td vaccine (1 - Tdap) 01/21/1982    Diabetes screen  01/21/2003    Shingles Vaccine (1 of 2) 01/21/2013    Colon cancer screen colonoscopy  01/21/2013    Lipid screen  09/02/2018    Flu vaccine (Season Ended) 09/01/2020    Hepatitis A vaccine  Aged Out    Hepatitis B vaccine  Aged Out    Hib vaccine  Aged Out    Meningococcal (ACWY) vaccine  Aged Out       Subjective:  Review of Systems  General:   No fever, no chills, No fatigue or weight loss  Pulmonary:    No dyspnea, no wheezing  Cardiac:    Denies recent chest pain,   GI:     No nausea or vomiting, no abdominal pain  Neuro:    No dizziness or light headedness,   Musculoskeletal:  No recent active issues  Extremities:   No edema, no obvious claudication       Objective:  Physical Exam  /72   Ht 6' (1.829 m)   Wt (!) 338 lb (153.3 kg)   BMI 45.84 kg/m²   General:   Well developed, well nourished  Lungs:   Clear to auscultation  Heart:    Normal S1 S2, Slight murmur. no rubs, no gallops  Abdomen:   Soft, non tender, no organomegalies, positive bowel sounds  Extremities:   No edema, no cyanosis, good peripheral pulses  Neurological:   Awake, alert, oriented. No obvious focal deficits  Musculoskelatal:  No obvious deformities    Assessment:      Diagnosis Orders   1. SOB (shortness of breath)  EKG 12 lead   2. Essential hypertension     3. Coronary artery disease involving native coronary artery of native heart without angina pectoris     cardiac fair for now   ECG in office was done today. I reviewed the ECG. No acute findings    Plan:  No follow-ups on file. As above  Continue risk factor modification and medical management  Thank you for allowing me to participate in the care of your patient. Please don't hesitate to contact me regarding any further issues related to the patient care      Orders Placed:  Orders Placed This Encounter   Procedures    EKG 12 lead     Order Specific Question:   Reason for Exam?     Answer: Other       Medications Prescribed:  No orders of the defined types were placed in this encounter. Discussed use, benefit, and side effects of prescribed medications. All patient questions answered. Pt voicedunderstanding. Instructed to continue current medications, diet and exercise. Continue risk factor modification and medical management. Patient agreed with treatment plan. Follow up as directed.     Electronically signedby Argyle Lesches, MD on 6/18/2020 at 2:23 PM

## 2020-07-09 ENCOUNTER — OFFICE VISIT (OUTPATIENT)
Dept: PULMONOLOGY | Age: 55
End: 2020-07-09
Payer: COMMERCIAL

## 2020-07-09 VITALS
DIASTOLIC BLOOD PRESSURE: 72 MMHG | OXYGEN SATURATION: 98 % | SYSTOLIC BLOOD PRESSURE: 128 MMHG | HEIGHT: 72 IN | WEIGHT: 315 LBS | BODY MASS INDEX: 42.66 KG/M2 | HEART RATE: 92 BPM

## 2020-07-09 PROCEDURE — 99213 OFFICE O/P EST LOW 20 MIN: CPT | Performed by: NURSE PRACTITIONER

## 2020-07-09 NOTE — PROGRESS NOTES
[facility] and [address] [facility]         [id number]  7/9/2020   Chief Complaint   Patient presents with    Follow-up     dre 1 year f/u with download         Pt of Dr. Lorenzo BILLINGS Download:   Yuly Alston or initial AHI: 16.8   Date of initial 7/13/12 study:   Weight of initial study: 290  [x] Compliant  93.3%   [] Noncompliant 6.7%     PAP Type bi-papLevel  epap 11 ipap 15   Avg Hrs/Day [id number]  AHI: 1.0   Recorded compliance dates, 6/9/20 to 7/8/20   Machine/Mfg: Respironics Interface: full    Provider:  [x]-BLAKE  []Diann []Abdirahman  []Leni         [][facility] []Other:     Neck Size: 17  Mallampati 4   ESS:  0  Here is a scan of the most recent download:            Presentation:   Jeanne Saha presents for sleep medicine follow up for obstructive sleep apnea. Since the last visit, Jeanne Saha continues to do well with BiPAP, no complaints. Injured shoulder, needs surgery. Equipment issues: The pressure is acceptable, the mask is acceptable and he is using the humidity. Sleep issues:  Do you feel better? Yes  More rested? Yes   Better concentration? yes    Progress History:   Since last visit any new medical issues? No  New ER or hospitlal visits? No  Any new or changes in medicines? No  Any new sleep medicines? No      Past Medical History:   Diagnosis Date    Anxiety attacks     Anxiety due to family issues.  Arthritis     Asthma     as child    Atypical chest pain     Cuevas esophagus     Chronic back pain     Coronary artery disease     S/P stent of the LAD by Dr. Catarino Dhillon on 2 23 2010.  Erectile dysfunction     Family history of premature CAD     Gastroesophageal reflux disease     Groin hematoma     Herniated disc 2011    back and neck    History of erectile dysfunction 2008    History of giardia infection     History of Giardia.  History of iron deficiency     History of pulmonary embolus (PE) 1980's.     Remote history of pulmonary embolus and negative computed tomography for pulmonary embolus.  History of smoking     Hyperlipidemia     Well managed.  Hypogonadism, male     The patient has central hypogonadism.  Joint pain     Morbid obesity     KIRAN (obstructive sleep apnea)     S/P angioplasty     S/P PTCA: 2015: FFR-guided stenting of distal and mid-LAD using Xience Alpine 2.5X15, post-2.77, and Alpine 2.75X18 mm, post-3.18 mm.  2015: FFR-guided stenting of distal and mid-LAD using Xience Alpine 2.5 X 15 mm, post-dilated to 2.77 mm, and Xience Alpine 2.75 X 18 mm, post-dilated to 3.18 mm. Dr. Thompson Batista   2010: Stenting of distal LAD using Xience 2.5 X 15 overlapping distally with Xience 2.5 X 12 mm, Dr. Anoop Pinto Testosterone deficiency     Thyroid nodule     The patient is euthyroid. Past Surgical History:   Procedure Laterality Date    APPENDECTOMY      BACK SURGERY      X 2 FOR HERNIATED DISCS    CARDIOVASCULAR STRESS TEST  3 26 2010    Mild chest discomfort induced by IV Lexiscan that resolved spontaneously. No arrhythmias. No EKG changes to suggest ischemia.  CARPAL TUNNEL RELEASE      COLONOSCOPY      CORONARY ANGIOPLASTY      X 2 STENTS    DIAGNOSTIC CARDIAC CATH LAB PROCEDURE  2010    Successful drug-eluting stent x 2 of mid LAD. LV borderline normal LV function. EF 50-55%. No wall motion abnormalities detected and no MR. Normal hemodynamics. Coronaries - diffuse left anterior descending (LAD) disease with proximal 50% and distal around 70%.     [facility]    right    KNEE SURGERY      left    LYMPH NODE BIOPSY  2011    neck    [address] [facility] [place] [facility] [place]    UPPER GASTROINTESTINAL ENDOSCOPY  2013       Social History     Tobacco Use    Smoking status: Former Smoker     Packs/day: 1.00     Years: 14.00     Pack years: 14.00     Types: Cigarettes     Last attempt to quit: 8/3/2002     Years since quittin.9    Smokeless tobacco: Current User Types: Chew    Tobacco comment: Patient states, \"he chews tobacco.\"    Substance Use Topics    Alcohol use: Yes     Alcohol/week: 0.0 standard drinks     Comment: Patient states, \"seldom. \"    Drug use: No       No Known Allergies    Current Outpatient Medications   Medication Sig Dispense Refill    atorvastatin (LIPITOR) 40 MG tablet TAKE 1 TABLET EVERY OTHER DAY 90 tablet 3    metoprolol succinate (TOPROL XL) 25 MG extended release tablet TAKE 1 TABLET TWICE A  tablet 3    pantoprazole (PROTONIX) 40 MG tablet TAKE 1 TABLET TWICE A  tablet 3    naproxen (NAPROSYN) 500 MG tablet Take 1 tablet by mouth 2 times daily (with meals) for 7 days 14 tablet 0    CPAP Machine MISC by Does not apply route Please check patient CPAP machine for proper functioning. To be set at BiPAP 15/11 cwp. 1 each 0    nitroGLYCERIN (NITROSTAT) 0.4 MG SL tablet Place 1 tablet under the tongue every 5 minutes as needed for Chest pain 25 tablet 3    Multiple Vitamins-Minerals (THERAPEUTIC MULTIVITAMIN-MINERALS) tablet Take 1 tablet by mouth daily      aspirin 81 MG tablet Take 81 mg by mouth daily.  Cyanocobalamin (VITAMIN B 12 PO) Take 1,000 mg by mouth. No current facility-administered medications for this visit. Family History   Problem Relation Age of Onset    Heart Disease Mother         Bypass/Surgery.  Cancer Mother         Breast cancer.  Coronary Art Dis Mother         History of CAD with myocardial infarction and open heart surgery in her 63's.  COPD Mother     Heart Disease Father         Bypass/Surgery.  Cancer Father         Leukemia.  Coronary Art Dis Father         History of CAD with myocardial infarction and open heart surgery around his 52's.  Prostate Cancer Neg Hx           Review of Systems   General/Constitutional: No recent loss of weight or appetite changes. No fever or chills. HENT: Negative. Eyes: Negative.   Upper respiratory tract: No nasal stuffiness or post nasal drip. Lower respiratory tract/ lungs: No cough or sputum production. No hemoptysis. Cardiovascular: No palpitations or chest pain. Gastrointestinal: No nausea or vomiting. Neurological: No focal neurologiacal weakness. Extremities: No edema. Musculoskeletal: No complaints. Genitourinary: No complaints. Hematological: Negative. Psychiatric/Behavioral: Negative. Skin: No itching. Physical Exam:    BMI: Body mass index is 46.17 kg/m². Wt Readings from Last 3 Encounters:   07/09/20 (!) 340 lb 6.4 oz (154.4 kg)   06/18/20 (!) 338 lb (153.3 kg)   05/20/20 (!) 345 lb (156.5 kg)     Weight gained 13 lbs over last year, 50 lbs since study   Vitals: /72 (Site: Left Lower Arm, Position: Sitting, Cuff Size: Medium Adult)   Pulse 92   Ht 6' (1.829 m)   Wt (!) 340 lb 6.4 oz (154.4 kg)   SpO2 98% Comment: room air at rest  BMI 46.17 kg/m²         General Appearance - Moderately built, moderately nourished, in no acute distress. HEENT - Head is normocephalic, atraumatic. PERRL. Oral mucosa pink and moist, no oral thrush. Mallampati Score - IV (only hard palate visible). Neck - Supple, symmetrical, trachea midline and soft. Lungs - Clear to auscultation, no wheezes, rales or rhonchi, aeration good. Cardiovascular - Heart sounds are normal. Regular rhythm normal rate without murmur, gallop or rub. Abdomen - Soft, nontender, non-distended. Neurologic - Alert and oriented x 3. Skin - No bruising or bleeding. Extremities - No cyanosis, clubbing or edema. ASSESSMENT/DIAGNOSIS     Diagnosis Orders   1. KIRAN on CPAP  DME Order for CPAP as OP            Plan   Do you need any equipment today? Yes Rx for new supplies.  -Take CPAP for surgery. - He was advised to continue current positive airway pressure therapy with above described pressure. - He was advised to keep good compliance with current recommended pressure to get optimal results and clinical improvement.   - Recommend 7-9 hours of sleep with PAP treatment. - He was advised to call [employer] company regarding supplies if needed.   -He is to call my office for earlier appointment if needed for worsening of sleep symptoms.   - He was instructed on weight loss. - Hayder Manzano was educated about my impression and plan and verbalizes understanding. We will see Richie Dickson back in: 1 year with download.     KANIKA Manzano - CNP  7/9/2020 4:59 PM

## 2020-11-03 PROBLEM — I25.10 CAD (CORONARY ARTERY DISEASE): Status: RESOLVED | Noted: 2020-11-03 | Resolved: 2020-11-03

## 2020-11-03 PROBLEM — R07.9 CHEST PAIN: Status: RESOLVED | Noted: 2020-11-03 | Resolved: 2020-11-03

## 2020-12-30 ENCOUNTER — APPOINTMENT (OUTPATIENT)
Dept: GENERAL RADIOLOGY | Age: 55
End: 2020-12-30
Payer: COMMERCIAL

## 2020-12-30 ENCOUNTER — HOSPITAL ENCOUNTER (EMERGENCY)
Age: 55
Discharge: HOME | End: 2020-12-30
Payer: COMMERCIAL

## 2020-12-30 ENCOUNTER — NURSE TRIAGE (OUTPATIENT)
Dept: OTHER | Facility: CLINIC | Age: 55
End: 2020-12-30

## 2020-12-30 VITALS
HEIGHT: 72 IN | OXYGEN SATURATION: 95 % | TEMPERATURE: 98.5 F | RESPIRATION RATE: 20 BRPM | BODY MASS INDEX: 42.66 KG/M2 | HEART RATE: 89 BPM | WEIGHT: 315 LBS | DIASTOLIC BLOOD PRESSURE: 71 MMHG | SYSTOLIC BLOOD PRESSURE: 136 MMHG

## 2020-12-30 DIAGNOSIS — J40 BRONCHITIS: Primary | ICD-10-CM

## 2020-12-30 PROCEDURE — 99213 OFFICE O/P EST LOW 20 MIN: CPT

## 2020-12-30 PROCEDURE — 71046 X-RAY EXAM CHEST 2 VIEWS: CPT

## 2020-12-30 RX ORDER — PREDNISONE 20 MG/1
40 TABLET ORAL DAILY
Qty: 14 TABLET | Refills: 0 | Status: SHIPPED | OUTPATIENT
Start: 2020-12-30 | End: 2021-01-06

## 2020-12-30 RX ORDER — AZITHROMYCIN 250 MG/1
TABLET, FILM COATED ORAL
Qty: 1 PACKET | Refills: 0 | Status: SHIPPED | OUTPATIENT
Start: 2020-12-30 | End: 2021-01-15

## 2020-12-30 RX ORDER — ALBUTEROL SULFATE 90 UG/1
2 AEROSOL, METERED RESPIRATORY (INHALATION) EVERY 4 HOURS PRN
Qty: 1 INHALER | Refills: 0 | Status: SHIPPED | OUTPATIENT
Start: 2020-12-30

## 2020-12-30 ASSESSMENT — ENCOUNTER SYMPTOMS
CHEST TIGHTNESS: 0
DIARRHEA: 0
SHORTNESS OF BREATH: 0
VOMITING: 0
NAUSEA: 0
COUGH: 1
SORE THROAT: 0

## 2020-12-30 ASSESSMENT — PAIN DESCRIPTION - PROGRESSION: CLINICAL_PROGRESSION: GRADUALLY WORSENING

## 2020-12-30 ASSESSMENT — PAIN DESCRIPTION - FREQUENCY: FREQUENCY: INTERMITTENT

## 2020-12-30 ASSESSMENT — PAIN DESCRIPTION - PAIN TYPE: TYPE: ACUTE PAIN

## 2020-12-30 ASSESSMENT — PAIN DESCRIPTION - DESCRIPTORS: DESCRIPTORS: SHARP

## 2020-12-30 NOTE — ED NOTES
PT GIVEN DISCHARGE INSTRUCTIONS, VERBALIZES UNDERSTANDING. PT ASSESSMENT UNCHANGED, DISCHARGED IN STABLE CONDITION.         Kassie Montoya RN  12/30/20 [id number]

## 2020-12-30 NOTE — ED PROVIDER NOTES
[address]  Urgent Care Encounter       CHIEF COMPLAINT       Chief Complaint   Patient presents with    Cough       Nurses Notes reviewed and I agree except as noted in the HPI. HISTORY OF PRESENT ILLNESS   Tommie Martinez is a 60 y.o. male who presents for evaluation of a tight, mostly nonproductive cough that has been ongoing for the past 7 to 8 days. Patient denies any fever, chills, nausea, vomiting, or loss of smell and taste. He denies any known sick exposures. He states that his cough seems to be worse anytime he goes outside into the cool air. He states that he has had pneumonia in the past and is concerned that this could be the case as he has had some pain with coughing in the left rib/side. He denies any chest pain, chest tightness or shortness of breath. States that he did try some NyQuil at home with minimal relief. The history is provided by the patient. REVIEW OF SYSTEMS     Review of Systems   Constitutional: Negative for chills and fever. HENT: Negative for congestion and sore throat. Respiratory: Positive for cough. Negative for chest tightness and shortness of breath. Cardiovascular: Negative for chest pain. Gastrointestinal: Negative for diarrhea, nausea and vomiting. Musculoskeletal: Negative for arthralgias and myalgias. Skin: Negative for rash. Neurological: Negative for headaches. PAST MEDICAL HISTORY         Diagnosis Date    Anxiety attacks     Anxiety due to family issues.  Arthritis     Asthma     as child    Atypical chest pain     Cuevas esophagus     Chronic back pain     Coronary artery disease     S/P stent of the LAD by Dr. Dontrell Bowen on 2 23 2010.  Erectile dysfunction     Family history of premature CAD     Gastroesophageal reflux disease     Groin hematoma     Herniated disc 2011    back and neck    History of erectile dysfunction 2008    History of giardia infection     History of Giardia.      (NAPROSYN) 500 MG TABLET    Take 1 tablet by mouth 2 times daily (with meals) for 7 days    NITROGLYCERIN (NITROSTAT) 0.4 MG SL TABLET    Place 1 tablet under the tongue every 5 minutes as needed for Chest pain    PANTOPRAZOLE (PROTONIX) 40 MG TABLET    TAKE 1 TABLET TWICE A DAY       ALLERGIES     Patient is has No Known Allergies. Patients   Immunization History   Administered Date(s) Administered    Pneumococcal Conjugate 7-valent (Thana Nose) 10/29/2011       FAMILY HISTORY     Patient's family history includes COPD in his mother; Cancer in his father and mother; Coronary Art Dis in his father and mother; Heart Disease in his father and mother. SOCIAL HISTORY     Patient  reports that he quit smoking about 18 years ago. His smoking use included cigarettes. He has a 14.00 pack-year smoking history. His smokeless tobacco use includes chew. He reports current alcohol use. He reports that he does not use drugs. PHYSICAL EXAM     ED TRIAGE VITALS  BP: 136/71, Temp: 98.5 °F (36.9 °C), Pulse: 89, Resp: 20, SpO2: 95 %,Estimated body mass index is 43.4 kg/m² as calculated from the following:    Height as of this encounter: 6' (1.829 m). Weight as of this encounter: 320 lb (145.2 kg). ,No LMP for male patient. Physical Exam  Vitals signs and nursing note reviewed. Constitutional:       General: He is not in acute distress. Appearance: He is well-developed. He is not diaphoretic. HENT:      Right Ear: Tympanic membrane and ear canal normal.      Left Ear: Tympanic membrane and ear canal normal.      Mouth/Throat:      Mouth: Mucous membranes are moist.      Pharynx: Oropharynx is clear. Eyes:      Conjunctiva/sclera:      Right eye: Right conjunctiva is not injected. Left eye: Left conjunctiva is not injected. Pupils: Pupils are equal.   Neck:      Musculoskeletal: Normal range of motion. Cardiovascular:      Rate and Rhythm: Normal rate and regular rhythm. Heart sounds: No murmur. Pulmonary:      Effort: Pulmonary effort is normal. No respiratory distress. Breath sounds: Normal breath sounds. Musculoskeletal:      Right knee: He exhibits normal range of motion. Left knee: He exhibits normal range of motion. Skin:     General: Skin is warm. Findings: No rash. Neurological:      Mental Status: He is alert and oriented to person, place, and time. Psychiatric:         Behavior: Behavior normal.         DIAGNOSTIC RESULTS     Labs:No results found for this visit on 12/30/20. IMAGING:    XR CHEST (2 VW)   Final Result   Stable radiographic appearance of the chest. No evidence of an acute process. **This report has been created using voice recognition software. It may contain minor errors which are inherent in voice recognition technology. **      Final report electronically signed by Dr. Ginger Wood on 12/30/2020 12:19 PM            EKG:  none    URGENT CARE COURSE:     Vitals:    12/30/20 1152   BP: 136/71   Pulse: 89   Resp: 20   Temp: 98.5 °F (36.9 °C)   TempSrc: Oral   SpO2: 95%   Weight: (!) 320 lb (145.2 kg)   Height: 6' (1.829 m)       Medications - No data to display         PROCEDURES:  None    FINAL IMPRESSION      1. Bronchitis          DISPOSITION/ PLAN     X-rays negative for any acute process per the read of the radiologist.  I discussed with the patient that I believe his symptoms are most likely related to bronchitis. Did offer Covid testing, however based on the length of symptoms and his lack of any known sick exposures I do not believe this is a significant concern the patient states that he is agreeable to holding off on Covid testing at this time. He is given a prescription for azithromycin, prednisone, and albuterol inhaler and advised to follow-up with his PCP if his symptoms do not improve. He is agreeable to plan as discussed.       PATIENT REFERRED TO:  DO Farrah Crump, [address] / [facility] [address]      DISCHARGE MEDICATIONS:  New Prescriptions    ALBUTEROL SULFATE  (90 BASE) MCG/ACT INHALER    Inhale 2 puffs into the lungs every 4 hours as needed for Wheezing or Shortness of Breath    AZITHROMYCIN (ZITHROMAX Z-ZIADA) 250 MG TABLET    Take 2 tablets (500 mg) on Day 1, and then take 1 tablet (250 mg) on days 2 through 5.     PREDNISONE (DELTASONE) 20 MG TABLET    Take 2 tablets by mouth daily for 7 days       Discontinued Medications    No medications on file       Current Discharge Medication List          KANIKA Hager CNP    (Please note that portions of this note were completed with a voice recognition program. Efforts were made to edit the dictations but occasionally words are mis-transcribed.)          KANIKA Hager CNP  12/30/20 1691

## 2020-12-30 NOTE — TELEPHONE ENCOUNTER
especially support the diagnosis of COVID-19)        Headache, cough, diarrhea, sob- can not get a deep breath, and fatigue. Protocols used: CORONAVIRUS (BGASD-15) DIAGNOSED OR SUSPECTED-ADULT-AH    Caller states started with s/s 12/23/2020,       headache, cough, diarrhea, sob- can not get a deep breath, and fatigue. Caller denies any difficult breathing, or chest pain at this time. Caller does have a cardiac history. Recommendation Call PCP Now. Care Advice provided, patient acknowledged understanding of care advice and is in agreement with plan. Patient has no further questions, instructed to call back for any new or worsening symptoms. Attention provider: Your patient utilized nurse triage services offered by employer, payer or community. This encounter includes an overview of the reason for call, assessment and recommended disposition. Please do not respond through this encounter as the response is not directed to a shared pool.

## 2020-12-31 ENCOUNTER — TELEPHONE (OUTPATIENT)
Dept: FAMILY MEDICINE CLINIC | Age: 55
End: 2020-12-31

## 2021-01-15 ENCOUNTER — APPOINTMENT (OUTPATIENT)
Dept: GENERAL RADIOLOGY | Age: 56
End: 2021-01-15
Payer: COMMERCIAL

## 2021-01-15 ENCOUNTER — HOSPITAL ENCOUNTER (EMERGENCY)
Age: 56
Discharge: HOME | End: 2021-01-15
Payer: COMMERCIAL

## 2021-01-15 VITALS
HEART RATE: 87 BPM | TEMPERATURE: 97.9 F | DIASTOLIC BLOOD PRESSURE: 62 MMHG | RESPIRATION RATE: 18 BRPM | SYSTOLIC BLOOD PRESSURE: 130 MMHG | OXYGEN SATURATION: 95 %

## 2021-01-15 DIAGNOSIS — R05.3 PERSISTENT COUGH FOR 3 WEEKS OR LONGER: Primary | ICD-10-CM

## 2021-01-15 PROCEDURE — 99213 OFFICE O/P EST LOW 20 MIN: CPT | Performed by: NURSE PRACTITIONER

## 2021-01-15 PROCEDURE — 71046 X-RAY EXAM CHEST 2 VIEWS: CPT

## 2021-01-15 PROCEDURE — 99213 OFFICE O/P EST LOW 20 MIN: CPT

## 2021-01-15 RX ORDER — PREDNISONE 20 MG/1
40 TABLET ORAL DAILY
Qty: 10 TABLET | Refills: 0 | Status: SHIPPED | OUTPATIENT
Start: 2021-01-15 | End: 2021-01-20

## 2021-01-15 RX ORDER — GUAIFENESIN AND CODEINE PHOSPHATE 100; 10 MG/5ML; MG/5ML
5 SOLUTION ORAL 4 TIMES DAILY PRN
Qty: 100 ML | Refills: 0 | Status: SHIPPED | OUTPATIENT
Start: 2021-01-15 | End: 2022-06-27 | Stop reason: SDUPTHER

## 2021-01-15 ASSESSMENT — ENCOUNTER SYMPTOMS
NAUSEA: 0
SINUS PAIN: 0
SHORTNESS OF BREATH: 0
SINUS PRESSURE: 0
SORE THROAT: 0
VOMITING: 0
RHINORRHEA: 0
COUGH: 1

## 2021-01-15 NOTE — ED NOTES
Patient discharge instructions given to pt and pt verbalized understanding, 2 px given, no other needs at this time, and pt left in stable condition.      Graciela Abbott RN  01/15/21 [id number]

## 2021-01-15 NOTE — ED PROVIDER NOTES
[address]  Urgent Care Encounter       CHIEF COMPLAINT       Chief Complaint   Patient presents with    Cough     onset a month ago       Nurses Notes reviewed and I agree except as noted in the HPI. HISTORY OF PRESENT ILLNESS   Ambrosio Hair is a 60 y.o. male who presents with cough that started 3 weeks ago. He was seen on 12/30/20 and treated in the  and has finished the medications that he was prescribed. He stated that he still has his inhaler. He stated that the medications that he took did help with his cough. He stated that his cough has been productive. He is coughing up clear sputum. He denies fever, chills, nausea, vomiting. He stated that he has a history of getting pneumonia every year. He denies SOB. He states that his cough is worse at night. He has not tried to take any over the counter medication. He denies being around any sick contacts. Patient states that he recently had a water leak in his bathroom shower and it is being remodeled at present time and he is worried about the possibility of mold in his bathroom. The history is provided by the patient. REVIEW OF SYSTEMS     Review of Systems   Constitutional: Negative for chills and fever. HENT: Negative for congestion, postnasal drip, rhinorrhea, sinus pressure, sinus pain and sore throat. Respiratory: Positive for cough. Negative for shortness of breath. Cardiovascular: Negative for chest pain. Gastrointestinal: Negative for nausea and vomiting. Skin: Negative for rash. Allergic/Immunologic: Negative for environmental allergies. Neurological: Negative for headaches. PAST MEDICAL HISTORY         Diagnosis Date    Anxiety attacks     Anxiety due to family issues.  Arthritis     Asthma     as child    Atypical chest pain     Cuevas esophagus     Chronic back pain     Coronary artery disease     S/P stent of the LAD by Dr. Dyana Marquez on 2 23 2010.     Erectile dysfunction     Family history of premature CAD     Gastroesophageal reflux disease     Groin hematoma     Herniated disc 2011    back and neck    History of erectile dysfunction 2008    History of giardia infection     History of Giardia.  History of iron deficiency     History of pulmonary embolus (PE) 1980's. Remote history of pulmonary embolus and negative computed tomography for pulmonary embolus.  History of smoking     Hyperlipidemia     Well managed.  Hypogonadism, male     The patient has central hypogonadism.  Joint pain     Morbid obesity     KIRAN (obstructive sleep apnea)     S/P angioplasty     S/P PTCA: 1/12/2015: FFR-guided stenting of distal and mid-LAD using Xience Alpine 2.5X15, post-2.77, and Alpine 2.75X18 mm, post-3.18 mm.  1/12/2015 1/12/2015: FFR-guided stenting of distal and mid-LAD using Xience Alpine 2.5 X 15 mm, post-dilated to 2.77 mm, and Xience Alpine 2.75 X 18 mm, post-dilated to 3.18 mm. Dr. Jose Wilkinson   2/22/2010: Stenting of distal LAD using Xience 2.5 X 15 overlapping distally with Xience 2.5 X 12 mm, Dr. Madai Alfonso Testosterone deficiency     Thyroid nodule     The patient is euthyroid. SURGICALHISTORY     Patient  has a past surgical history that includes back surgery; Carpal tunnel release (1980); Elbow surgery (1980); knee surgery (1980); Appendectomy; Coronary angioplasty; cardiovascular stress test (3 26 2010); Diagnostic Cardiac Cath Lab Procedure (2 22 2010); lymph node biopsy (2011); Spine surgery (1993, 2000); Upper gastrointestinal endoscopy (2013); Colonoscopy (2013); and Shoulder arthroscopy.     CURRENT MEDICATIONS       Discharge Medication List as of 1/15/2021  6:47 PM      CONTINUE these medications which have NOT CHANGED    Details   albuterol sulfate  (90 Base) MCG/ACT inhaler Inhale 2 puffs into the lungs every 4 hours as needed for Wheezing or Shortness of Breath, Disp-1 Inhaler, R-0Normal      atorvastatin (LIPITOR) 40 MG tablet TAKE 1 General: He is not in acute distress. Appearance: He is well-developed. He is not ill-appearing. HENT:      Head: Normocephalic and atraumatic. Nose: No congestion or rhinorrhea. Mouth/Throat:      Lips: Pink. Mouth: Mucous membranes are moist.      Pharynx: Oropharynx is clear. Uvula midline. No posterior oropharyngeal erythema. Eyes:      General: Lids are normal. No scleral icterus. Conjunctiva/sclera: Conjunctivae normal.      Pupils: Pupils are equal.   Cardiovascular:      Rate and Rhythm: Normal rate and regular rhythm. Heart sounds: Normal heart sounds, S1 normal and S2 normal.   Pulmonary:      Effort: Pulmonary effort is normal. No respiratory distress. Breath sounds: Normal breath sounds. Musculoskeletal:      Comments: Normal active ROM x 4 extremities  Gait steady   Lymphadenopathy:      Comments: No head or neck adenopathy   Skin:     General: Skin is warm and dry. Findings: No rash (to exposed skin). Nails: There is no clubbing. Neurological:      General: No focal deficit present. Mental Status: He is alert and oriented to person, place, and time. Sensory: No sensory deficit. Comments: Answers questions readily and appropriately   Psychiatric:         Mood and Affect: Mood normal.         Speech: Speech normal.         Behavior: Behavior normal. Behavior is cooperative. DIAGNOSTIC RESULTS     Labs:No results found for this visit on 01/15/21. IMAGING:    XR CHEST (2 VW)   Final Result   No discrete lobar consolidation. Subtle groundglass infiltrates could be obscured by chest radiograph. Correlation with symptoms is advised. **This report has been created using voice recognition software. It may contain minor errors which are inherent in voice recognition technology. **      Final report electronically signed by Dr. Helga Gonzalez on 1/15/2021 6:44 PM            EKG:      URGENT CARE COURSE:     Vitals:    01/15/21 1707   BP: 130/62   Pulse: 87   Resp: 18   Temp: 97.9 °F (36.6 °C)   TempSrc: Temporal   SpO2: 95%       Medications - No data to display         PROCEDURES:  None    FINAL IMPRESSION      1. Persistent cough for 3 weeks or longer          DISPOSITION/ PLAN   Patient presents with cough that started 3 weeks ago. He was seen and treated in the  on 12-30-20 for bronchitis and he has finished the medications that he was prescribed. Patient denies SOB, fever, nausea, vomiting, or sick contact. Chest Xray completed and negative. Patient prescribed Prednisone and Guaifenesin-Codiene. Patient can take OTC cough suppressants. Follow up with PCP next week if not improved or if symptoms worsen or fail to improve report to the emergency room. Further instructions were outlined verbally and in the patient's discharge instructions. All the patient's questions were answered. The patient/parent agreed with the plan and was discharged from the  center in good condition. PATIENT REFERRED TO:  DO Farrah Garcia, [address] / [place] [address]      DISCHARGE MEDICATIONS:  Discharge Medication List as of 1/15/2021  6:47 PM      START taking these medications    Details   predniSONE (DELTASONE) 20 MG tablet Take 2 tablets by mouth daily for 5 days, Disp-10 tablet, R-0Normal      guaiFENesin-codeine (TUSSI-ORGANIDIN NR) 100-10 MG/5ML syrup Take 5 mLs by mouth 4 times daily as needed for Cough for up to 7 days. , Disp-100 mL, R-0Print             Discharge Medication List as of 1/15/2021  6:47 PM      STOP taking these medications       azithromycin (ZITHROMAX Z-ZAIDA) 250 MG tablet Comments:   Reason for Stopping:               Discharge Medication List as of 1/15/2021  6:47 PM          Ivy Lopes APRN - CNP    (Please note that portions of this note were completed with a voice recognition program. Efforts were made to edit the dictations but occasionally words are mis-transcribed.)         Ivy Riggs

## 2021-01-15 NOTE — ED TRIAGE NOTES
Patient walked to room 1 for cough onset a month ago. Patient states he was here on Dec and not any better. No other needs.

## 2021-01-18 ENCOUNTER — PATIENT MESSAGE (OUTPATIENT)
Dept: FAMILY MEDICINE CLINIC | Age: 56
End: 2021-01-18

## 2021-02-08 ENCOUNTER — OFFICE VISIT (OUTPATIENT)
Dept: FAMILY MEDICINE CLINIC | Age: 56
End: 2021-02-08
Payer: COMMERCIAL

## 2021-02-08 VITALS
DIASTOLIC BLOOD PRESSURE: 66 MMHG | TEMPERATURE: 97.4 F | WEIGHT: 315 LBS | OXYGEN SATURATION: 98 % | BODY MASS INDEX: 45.79 KG/M2 | RESPIRATION RATE: 20 BRPM | HEART RATE: 86 BPM | SYSTOLIC BLOOD PRESSURE: 120 MMHG

## 2021-02-08 DIAGNOSIS — E78.00 PURE HYPERCHOLESTEROLEMIA: ICD-10-CM

## 2021-02-08 DIAGNOSIS — Z00.00 WELL ADULT HEALTH CHECK: Primary | ICD-10-CM

## 2021-02-08 DIAGNOSIS — R73.01 IFG (IMPAIRED FASTING GLUCOSE): ICD-10-CM

## 2021-02-08 DIAGNOSIS — K21.9 GASTROESOPHAGEAL REFLUX DISEASE, UNSPECIFIED WHETHER ESOPHAGITIS PRESENT: ICD-10-CM

## 2021-02-08 DIAGNOSIS — E29.1 HYPOGONADISM, MALE: ICD-10-CM

## 2021-02-08 DIAGNOSIS — I25.10 CORONARY ARTERY DISEASE INVOLVING NATIVE CORONARY ARTERY OF NATIVE HEART WITHOUT ANGINA PECTORIS: ICD-10-CM

## 2021-02-08 DIAGNOSIS — E04.1 THYROID NODULE: ICD-10-CM

## 2021-02-08 DIAGNOSIS — G47.33 OBSTRUCTIVE SLEEP APNEA TREATED WITH BIPAP: ICD-10-CM

## 2021-02-08 DIAGNOSIS — E66.9 OBESITY (BMI 30-39.9): ICD-10-CM

## 2021-02-08 PROCEDURE — 99386 PREV VISIT NEW AGE 40-64: CPT | Performed by: FAMILY MEDICINE

## 2021-02-08 SDOH — ECONOMIC STABILITY: INCOME INSECURITY: HOW HARD IS IT FOR YOU TO PAY FOR THE VERY BASICS LIKE FOOD, HOUSING, MEDICAL CARE, AND HEATING?: NOT HARD AT ALL

## 2021-02-08 SDOH — ECONOMIC STABILITY: FOOD INSECURITY: WITHIN THE PAST 12 MONTHS, YOU WORRIED THAT YOUR FOOD WOULD RUN OUT BEFORE YOU GOT MONEY TO BUY MORE.: NEVER TRUE

## 2021-02-08 SDOH — ECONOMIC STABILITY: TRANSPORTATION INSECURITY
IN THE PAST 12 MONTHS, HAS LACK OF TRANSPORTATION KEPT YOU FROM MEETINGS, WORK, OR FROM GETTING THINGS NEEDED FOR DAILY LIVING?: NO

## 2021-02-08 ASSESSMENT — ENCOUNTER SYMPTOMS
GASTROINTESTINAL NEGATIVE: 1
RESPIRATORY NEGATIVE: 1

## 2021-02-08 ASSESSMENT — PATIENT HEALTH QUESTIONNAIRE - PHQ9
SUM OF ALL RESPONSES TO PHQ QUESTIONS 1-9: 0
SUM OF ALL RESPONSES TO PHQ QUESTIONS 1-9: 0

## 2021-02-08 NOTE — PROGRESS NOTES
2021    Jayesh Sen (:  1965) is a 60 y.o. male, here for a preventive medicine evaluation. Chief Complaint   Patient presents with   [facility].     former pt of the practice     Annual Exam    Colonoscopy     NP to re-establish, last seen . Hx of CAD, follows closely with Dr. Ada Davidson yearly. Hx of KIRAN, on CPAP. BPs fine. BP Readings from Last 3 Encounters:   21 120/66   01/15/21 130/62   20 136/71     Wt Readings from Last 3 Encounters:   21 (!) 337 lb 9.6 oz (153.1 kg)   20 (!) 320 lb (145.2 kg)   20 (!) 340 lb 6.4 oz (154.4 kg)     GERD controlled on the Protonix. Pt due for CRS, plans to have in the near future at [facility]. Patient Active Problem List   Diagnosis    Asthma    Gastroesophageal reflux disease    Hyperlipidemia    History of smoking    Chronic back pain    S/P angioplasty    Groin hematoma    Testosterone deficiency    Joint pain    Arthritis    Atypical chest pain    Family history of premature CAD    Hypogonadism, male    Thyroid nodule    History of erectile dysfunction    Male sexual dysfunction    Heart disease    Chest pain with moderate risk of acute coronary syndrome    Coronary atherosclerosis    S/P PTCA: 2015: FFR-guided stenting of distal and mid-LAD using Xience Alpine 2.5X15, post-2.77, and Alpine 2.75X18 mm, post-3.18 mm.  Obesity (BMI 30-39. 9)    Obstructive sleep apnea treated with BiPAP       Review of Systems   Constitutional: Negative. HENT: Negative. Respiratory: Negative. Cardiovascular: Negative. Gastrointestinal: Negative. Musculoskeletal: Negative. All other systems reviewed and are negative. Prior to Visit Medications    Medication Sig Taking?  Authorizing Provider albuterol sulfate  (90 Base) MCG/ACT inhaler Inhale 2 puffs into the lungs every 4 hours as needed for Wheezing or Shortness of Breath Yes KANIKA Flores CNP   atorvastatin (LIPITOR) 40 MG tablet TAKE 1 TABLET EVERY OTHER DAY Yes Travis Bone MD   metoprolol succinate (TOPROL XL) 25 MG extended release tablet TAKE 1 TABLET TWICE A DAY Yes Travis Bone MD   pantoprazole (PROTONIX) 40 MG tablet TAKE 1 TABLET TWICE A DAY Yes Travis Bone MD   CPAP Machine MISC by Does not apply route Please check patient CPAP machine for proper functioning. To be set at BiPAP 15/11 cwp. Yes KANIKA Murphy CNP   nitroGLYCERIN (NITROSTAT) 0.4 MG SL tablet Place 1 tablet under the tongue every 5 minutes as needed for Chest pain Yes Travis Bone MD   Multiple Vitamins-Minerals (THERAPEUTIC MULTIVITAMIN-MINERALS) tablet Take 1 tablet by mouth daily Yes Historical Provider, MD   aspirin 81 MG tablet Take 81 mg by mouth daily. Yes Historical Provider, MD   Cyanocobalamin (VITAMIN B 12 PO) Take 1,000 mg by mouth. Yes Historical Provider, MD   naproxen (NAPROSYN) 500 MG tablet Take 1 tablet by mouth 2 times daily (with meals) for 7 days  KANIKA Marlow CNP        No Known Allergies    Past Medical History:   Diagnosis Date    Anxiety attacks     Anxiety due to family issues.  Arthritis     Asthma     as child    Atypical chest pain     Cuevas esophagus     Chronic back pain     Coronary artery disease     S/P stent of the LAD by Dr. Amy Huang on 2 23 2010.  Erectile dysfunction     Family history of premature CAD     Gastroesophageal reflux disease     Groin hematoma     Herniated disc 2011    back and neck    History of erectile dysfunction 2008    History of giardia infection     History of Giardia.  History of iron deficiency     History of pulmonary embolus (PE) 1980's. Remote history of pulmonary embolus and negative computed tomography for pulmonary embolus.  History of smoking     Hyperlipidemia     Well managed.  Hypogonadism, male     The patient has central hypogonadism.  Joint pain     Morbid obesity     KIRAN (obstructive sleep apnea)     S/P angioplasty     S/P PTCA: 1/12/2015: FFR-guided stenting of distal and mid-LAD using Xience Alpine 2.5X15, post-2.77, and Alpine 2.75X18 mm, post-3.18 mm.  1/12/2015 1/12/2015: FFR-guided stenting of distal and mid-LAD using Xience Alpine 2.5 X 15 mm, post-dilated to 2.77 mm, and Xience Alpine 2.75 X 18 mm, post-dilated to 3.18 mm. Dr. Pleitez Phlegm   2/22/2010: Stenting of distal LAD using Xience 2.5 X 15 overlapping distally with Xience 2.5 X 12 mm, Dr. Serrano High Testosterone deficiency     Thyroid nodule     The patient is euthyroid. Past Surgical History:   Procedure Laterality Date    APPENDECTOMY      BACK SURGERY      X 2 FOR HERNIATED DISCS    CARDIOVASCULAR STRESS TEST  3 26 2010    Mild chest discomfort induced by IV Lexiscan that resolved spontaneously. No arrhythmias. No EKG changes to suggest ischemia.  CARPAL TUNNEL RELEASE  1980    COLONOSCOPY  2013    CORONARY ANGIOPLASTY      X 2 STENTS    DIAGNOSTIC CARDIAC CATH LAB PROCEDURE  2 22 2010    Successful drug-eluting stent x 2 of mid LAD. LV borderline normal LV function. EF 50-55%. No wall motion abnormalities detected and no MR. Normal hemodynamics. Coronaries - diffuse left anterior descending (LAD) disease with proximal 50% and distal around 70%.     [facility]    right    KNEE SURGERY  1980    left    LYMPH NODE BIOPSY  2011    neck    SHOULDER ARTHROSCOPY     [facility] SURGERY  1993, 2000    UPPER GASTROINTESTINAL ENDOSCOPY  2013       Social History     Socioeconomic History    Marital status: Single     Spouse name: Not on file    Number of children: Not on file    Years of education: 15  Highest education level: 12th grade   Occupational History    Not on file   Social Needs    Financial resource strain: Not hard at all   Hardesty-Debbi insecurity     Worry: Never true     Inability: Never true   [employer] needs     Medical: No     Non-medical: No   Tobacco Use    Smoking status: Former Smoker     Packs/day: 1.00     Years: 14.00     Pack years: 14.00     Types: Cigarettes     Quit date: 8/3/2002     Years since quittin.5    Smokeless tobacco: Current User     Types: Chew    Tobacco comment: Patient states, \"he chews tobacco.\"    Substance and Sexual Activity    Alcohol use: Yes     Alcohol/week: 0.0 standard drinks     Comment: Patient states, \"seldom. \"    Drug use: No    Sexual activity: Yes   Lifestyle    Physical activity     Days per week: Not on file     Minutes per session: Not on file    Stress: Not on file   Relationships    Social connections     Talks on phone: Not on file     Gets together: Not on file     Attends Latter-day service: Not on file     Active member of club or organization: Not on file     Attends meetings of clubs or organizations: Not on file     Relationship status: Not on file    Intimate partner violence     Fear of current or ex partner: Not on file     Emotionally abused: Not on file     Physically abused: Not on file     Forced sexual activity: Not on file   Other Topics Concern    Not on file   Social History Narrative    Not on file        Family History   Problem Relation Age of Onset    Heart Disease Mother         Bypass/Surgery.  Cancer Mother         Breast cancer.  Coronary Art Dis Mother         History of CAD with myocardial infarction and open heart surgery in her 63's.  COPD Mother     Heart Disease Father         Bypass/Surgery.  Cancer Father         Leukemia.  Coronary Art Dis Father         History of CAD with myocardial infarction and open heart surgery around his 52's.      Prostate Cancer Neg Hx ADVANCE DIRECTIVE: N, <no information>    Vitals:    02/08/21 1400   BP: 120/66   Pulse: 86   Resp: 20   Temp: 97.4 °F (36.3 °C)   TempSrc: Skin   SpO2: 98%   Weight: (!) 337 lb 9.6 oz (153.1 kg)     Estimated body mass index is 45.79 kg/m² as calculated from the following:    Height as of 12/30/20: 6' (1.829 m). Weight as of this encounter: 337 lb 9.6 oz (153.1 kg). Physical Exam  Vitals signs and nursing note reviewed. Constitutional:       General: He is not in acute distress. Appearance: Normal appearance. He is well-developed. HENT:      Head: Normocephalic and atraumatic. Right Ear: Tympanic membrane normal.      Left Ear: Tympanic membrane normal.   Eyes:      Conjunctiva/sclera: Conjunctivae normal.   Neck:      Musculoskeletal: Neck supple. Cardiovascular:      Rate and Rhythm: Normal rate and regular rhythm. Heart sounds: Normal heart sounds. No murmur. Pulmonary:      Effort: Pulmonary effort is normal.      Breath sounds: Normal breath sounds. No wheezing, rhonchi or rales. Abdominal:      General: There is no distension. Skin:     General: Skin is warm and dry. Findings: No rash (on exposed surfaces). Neurological:      General: No focal deficit present. Mental Status: He is alert. Psychiatric:         Attention and Perception: Attention normal.         Mood and Affect: Mood normal.         Speech: Speech normal.         Behavior: Behavior normal. Behavior is cooperative. Thought Content: Thought content normal.         Judgment: Judgment normal.         No flowsheet data found.     Lab Results   Component Value Date    CHOL 112 09/02/2017    CHOL 106 09/03/2016    CHOL 117 01/12/2015    TRIG 71 09/02/2017    TRIG 62 09/03/2016    TRIG 116 01/12/2015    HDL 42 09/02/2017    HDL 43 09/03/2016    HDL 39 01/12/2015    LDLCALC 56 09/02/2017    LDLCALC 51 09/03/2016    LDLCALC 55 01/12/2015    GLUCOSE 89 01/13/2015    GLUCOSE 93 02/11/2012

## 2021-02-08 NOTE — PROGRESS NOTES
Visit Information    Have you changed or started any medications since your last visit including any over-the-counter medicines, vitamins, or herbal medicines? no   Are you having any side effects from any of your medications? -  no  Have you stopped taking any of your medications? Is so, why? -  no    Have you seen any other physician or provider since your last visit? No  Have you had any other diagnostic tests since your last visit? No  Have you been seen in the emergency room and/or had an admission to a hospital since we last saw you? No  Have you had your routine dental cleaning in the past 6 months? yes -     Have you activated your t-Art account? If not, what are your barriers? Yes     Patient Care Team:  Lalo Jackson DO as PCP - General  Lalo Jackson DO as PCP - [facility] Provider    Medical History Review  Past Medical, Family, and Social History reviewed and does contribute to the patient presenting condition    Health Maintenance   Topic Date Due    Hepatitis C screen  1965    Pneumococcal 0-64 years Vaccine (1 of 1 - PPSV23) 01/21/1969    HIV screen  01/21/1978    DTaP/Tdap/Td vaccine (1 - Tdap) 01/21/1982    Diabetes screen  01/21/2003    Shingles Vaccine (1 of 2) 01/21/2013    Colon cancer screen colonoscopy  01/21/2013    Lipid screen  09/02/2018    Flu vaccine (1) 09/01/2020    Hepatitis A vaccine  Aged Out    Hepatitis B vaccine  Aged Out    Hib vaccine  Aged Out    Meningococcal (ACWY) vaccine  Aged Out       Pt informed and scheduled at [address] [place] [address] on 2/11/21 arrive at 12:30 pm to main radiology for a 1:00 pm Thyroid US. Pt aware that office will call with results once received.

## 2021-02-11 ENCOUNTER — HOSPITAL ENCOUNTER (OUTPATIENT)
Age: 56
Discharge: HOME | End: 2021-02-11
Payer: COMMERCIAL

## 2021-02-11 ENCOUNTER — HOSPITAL ENCOUNTER (OUTPATIENT)
Dept: ULTRASOUND IMAGING | Age: 56
Discharge: HOME | End: 2021-02-11
Payer: COMMERCIAL

## 2021-02-11 DIAGNOSIS — Z00.00 WELL ADULT HEALTH CHECK: ICD-10-CM

## 2021-02-11 DIAGNOSIS — E04.1 THYROID NODULE: ICD-10-CM

## 2021-02-11 LAB
ALBUMIN SERPL-MCNC: 3.6 G/DL (ref 3.5–5.1)
ALP BLD-CCNC: 87 U/L (ref 38–126)
ALT SERPL-CCNC: 42 U/L (ref 11–66)
ANION GAP SERPL CALCULATED.3IONS-SCNC: 8 MEQ/L (ref 8–16)
AST SERPL-CCNC: 37 U/L (ref 5–40)
AVERAGE GLUCOSE: 132 MG/DL (ref 70–126)
BASOPHILS # BLD: 0.9 %
BASOPHILS ABSOLUTE: 0.1 THOU/MM3 (ref 0–0.1)
BILIRUB SERPL-MCNC: 0.5 MG/DL (ref 0.3–1.2)
BUN BLDV-MCNC: 10 MG/DL (ref 7–22)
CALCIUM SERPL-MCNC: 8.9 MG/DL (ref 8.5–10.5)
CHLORIDE BLD-SCNC: 111 MEQ/L (ref 98–111)
CHOLESTEROL, TOTAL: 106 MG/DL (ref 100–199)
CO2: 24 MEQ/L (ref 23–33)
CREAT SERPL-MCNC: 0.6 MG/DL (ref 0.4–1.2)
EOSINOPHIL # BLD: 3.5 %
EOSINOPHILS ABSOLUTE: 0.2 THOU/MM3 (ref 0–0.4)
ERYTHROCYTE [DISTWIDTH] IN BLOOD BY AUTOMATED COUNT: 15.5 % (ref 11.5–14.5)
ERYTHROCYTE [DISTWIDTH] IN BLOOD BY AUTOMATED COUNT: 50.4 FL (ref 35–45)
GFR SERPL CREATININE-BSD FRML MDRD: > 90 ML/MIN/1.73M2
GLUCOSE BLD-MCNC: 121 MG/DL (ref 70–108)
HBA1C MFR BLD: 6.4 % (ref 4.4–6.4)
HCT VFR BLD CALC: 42.5 % (ref 42–52)
HDLC SERPL-MCNC: 40 MG/DL
HEMOGLOBIN: 12.9 GM/DL (ref 14–18)
IMMATURE GRANS (ABS): 0.02 THOU/MM3 (ref 0–0.07)
IMMATURE GRANULOCYTES: 0.4 %
LDL CHOLESTEROL CALCULATED: 51 MG/DL
LYMPHOCYTES # BLD: 19.8 %
LYMPHOCYTES ABSOLUTE: 1.1 THOU/MM3 (ref 1–4.8)
MCH RBC QN AUTO: 26.9 PG (ref 26–33)
MCHC RBC AUTO-ENTMCNC: 30.4 GM/DL (ref 32.2–35.5)
MCV RBC AUTO: 88.7 FL (ref 80–94)
MONOCYTES # BLD: 11.1 %
MONOCYTES ABSOLUTE: 0.6 THOU/MM3 (ref 0.4–1.3)
NUCLEATED RED BLOOD CELLS: 0 /100 WBC
PLATELET # BLD: 347 THOU/MM3 (ref 130–400)
PMV BLD AUTO: 10 FL (ref 9.4–12.4)
POTASSIUM SERPL-SCNC: 4.3 MEQ/L (ref 3.5–5.2)
PROSTATE SPECIFIC ANTIGEN: 1.91 NG/ML (ref 0–1)
RBC # BLD: 4.79 MILL/MM3 (ref 4.7–6.1)
SEG NEUTROPHILS: 64.3 %
SEGMENTED NEUTROPHILS ABSOLUTE COUNT: 3.7 THOU/MM3 (ref 1.8–7.7)
SODIUM BLD-SCNC: 143 MEQ/L (ref 135–145)
TOTAL PROTEIN: 6.9 G/DL (ref 6.1–8)
TRIGL SERPL-MCNC: 75 MG/DL (ref 0–199)
TSH SERPL DL<=0.05 MIU/L-ACNC: 1.08 UIU/ML (ref 0.4–4.2)
WBC # BLD: 5.7 THOU/MM3 (ref 4.8–10.8)

## 2021-02-11 PROCEDURE — 80053 COMPREHEN METABOLIC PANEL: CPT

## 2021-02-11 PROCEDURE — 76536 US EXAM OF HEAD AND NECK: CPT

## 2021-02-11 PROCEDURE — 84443 ASSAY THYROID STIM HORMONE: CPT

## 2021-02-11 PROCEDURE — 80061 LIPID PANEL: CPT

## 2021-02-11 PROCEDURE — 85025 COMPLETE CBC W/AUTO DIFF WBC: CPT

## 2021-02-11 PROCEDURE — G0103 PSA SCREENING: HCPCS

## 2021-02-11 PROCEDURE — 83036 HEMOGLOBIN GLYCOSYLATED A1C: CPT

## 2021-02-11 PROCEDURE — 36415 COLL VENOUS BLD VENIPUNCTURE: CPT

## 2021-02-15 ENCOUNTER — VIRTUAL VISIT (OUTPATIENT)
Dept: FAMILY MEDICINE CLINIC | Age: 56
End: 2021-02-15
Payer: COMMERCIAL

## 2021-02-15 DIAGNOSIS — E04.1 THYROID NODULE: Primary | ICD-10-CM

## 2021-02-15 DIAGNOSIS — G47.33 OBSTRUCTIVE SLEEP APNEA TREATED WITH BIPAP: ICD-10-CM

## 2021-02-15 DIAGNOSIS — R73.01 IFG (IMPAIRED FASTING GLUCOSE): ICD-10-CM

## 2021-02-15 DIAGNOSIS — E78.00 PURE HYPERCHOLESTEROLEMIA: ICD-10-CM

## 2021-02-15 DIAGNOSIS — E01.0 THYROMEGALY: ICD-10-CM

## 2021-02-15 DIAGNOSIS — I25.10 CORONARY ARTERY DISEASE INVOLVING NATIVE CORONARY ARTERY OF NATIVE HEART WITHOUT ANGINA PECTORIS: ICD-10-CM

## 2021-02-15 DIAGNOSIS — E66.9 OBESITY (BMI 30-39.9): ICD-10-CM

## 2021-02-15 DIAGNOSIS — K21.9 GASTROESOPHAGEAL REFLUX DISEASE, UNSPECIFIED WHETHER ESOPHAGITIS PRESENT: ICD-10-CM

## 2021-02-15 PROCEDURE — 99214 OFFICE O/P EST MOD 30 MIN: CPT | Performed by: FAMILY MEDICINE

## 2021-02-15 ASSESSMENT — ENCOUNTER SYMPTOMS
GASTROINTESTINAL NEGATIVE: 1
RESPIRATORY NEGATIVE: 1

## 2021-02-15 NOTE — PROGRESS NOTES
Corrine Villalba (:  1965) is a 60 y.o. male,Established patient, here for evaluation of the following chief complaint(s): Abnormal Test Results        SUBJECTIVE/OBJECTIVE:  HPI:    Chief Complaint   Patient presents with    Abnormal Test Results     Pt presents today to review labs and US results. Overall, labs look ok. A1C jumped a little, dietary changes needed. Lab Results   Component Value Date    LABA1C 6.4 2021     Lab Results   Component Value Date    LDLCALC 51 2021    CREATININE 0.6 2021     Hx of thyroid nodules. bx in the past negative. There has been some growth. Impression   1. There is thyromegaly, moderate on the right and moderate on the left.       2. Sonographic findings consistent with a multinodular goiter.       3. The previously biopsied nodule within the lower pole of the left lobe of the thyroid gland is larger measuring 3.3 x 2.8 x 2.7 cm on the current examination and 2.1 x 1.7 x 1.8 cm on the previous examination. A repeat ultrasound-guided fine-needle    aspiration biopsy is recommended given the interval enlargement of this nodule. Patient Active Problem List   Diagnosis    Asthma    Gastroesophageal reflux disease    Hyperlipidemia    History of smoking    Chronic back pain    S/P angioplasty    Groin hematoma    Testosterone deficiency    Joint pain    Arthritis    Atypical chest pain    Family history of premature CAD    Hypogonadism, male    Thyroid nodule    History of erectile dysfunction    Male sexual dysfunction    Heart disease    Chest pain with moderate risk of acute coronary syndrome    Coronary atherosclerosis    S/P PTCA: 2015: FFR-guided stenting of distal and mid-LAD using Xience Alpine 2.5X15, post-2.77, and Alpine 2.75X18 mm, post-3.18 mm.  Obesity (BMI 30-39. 9)    Obstructive sleep apnea treated with BiPAP     Past Surgical History:   Procedure Laterality Date    APPENDECTOMY  BACK SURGERY      X 2 FOR HERNIATED DISCS    CARDIOVASCULAR STRESS TEST  3 26 2010    Mild chest discomfort induced by IV Lexiscan that resolved spontaneously. No arrhythmias. No EKG changes to suggest ischemia.  CARPAL TUNNEL RELEASE  1980    COLONOSCOPY  2013    CORONARY ANGIOPLASTY      X 2 STENTS    DIAGNOSTIC CARDIAC CATH LAB PROCEDURE  2 22 2010    Successful drug-eluting stent x 2 of mid LAD. LV borderline normal LV function. EF 50-55%. No wall motion abnormalities detected and no MR. Normal hemodynamics. Coronaries - diffuse left anterior descending (LAD) disease with proximal 50% and distal around 70%. [facility]    right    KNEE SURGERY  1980    left    LYMPH NODE BIOPSY  2011    neck    SHOULDER ARTHROSCOPY     [facility] SURGERY  1993, 2000    UPPER GASTROINTESTINAL ENDOSCOPY  2013     Prior to Admission medications    Medication Sig Start Date End Date Taking? Authorizing Provider   albuterol sulfate  (90 Base) MCG/ACT inhaler Inhale 2 puffs into the lungs every 4 hours as needed for Wheezing or Shortness of Breath 12/30/20   KANIKA Robertson CNP   atorvastatin (LIPITOR) 40 MG tablet TAKE 1 TABLET EVERY OTHER DAY 6/18/20   Yousif Keating MD   metoprolol succinate (TOPROL XL) 25 MG extended release tablet TAKE 1 TABLET TWICE A DAY 6/18/20   Benedicto Walker MD   pantoprazole (PROTONIX) 40 MG tablet TAKE 1 TABLET TWICE A DAY 6/18/20   Yousif Keating MD   naproxen (NAPROSYN) 500 MG tablet Take 1 tablet by mouth 2 times daily (with meals) for 7 days 5/20/20 12/30/20  KANIKA Mcdonald CNP   CPAP Machine MISC by Does not apply route Please check patient CPAP machine for proper functioning.  To be set at BiPAP 15/11 cwp. 5/2/19   KANIKA Wyatt CNP   nitroGLYCERIN (NITROSTAT) 0.4 MG SL tablet Place 1 tablet under the tongue every 5 minutes as needed for Chest pain 9/1/17   Benedicto Walker MD Multiple Vitamins-Minerals (THERAPEUTIC MULTIVITAMIN-MINERALS) tablet Take 1 tablet by mouth daily    Historical Provider, MD   aspirin 81 MG tablet Take 81 mg by mouth daily. Historical Provider, MD   Cyanocobalamin (VITAMIN B 12 PO) Take 1,000 mg by mouth. Historical Provider, MD         Review of Systems   Constitutional: Negative. HENT: Negative. Respiratory: Negative. Cardiovascular: Negative. Gastrointestinal: Negative. Musculoskeletal: Negative. All other systems reviewed and are negative. No flowsheet data found. Physical Exam  Constitutional:       General: He is not in acute distress. Appearance: Normal appearance. He is well-developed. He is not ill-appearing. HENT:      Head: Normocephalic and atraumatic. Right Ear: External ear normal.      Left Ear: External ear normal.   Eyes:      Conjunctiva/sclera: Conjunctivae normal.   Pulmonary:      Effort: Pulmonary effort is normal. No respiratory distress. Skin:     Findings: No rash (on exposed surfaces). Neurological:      Mental Status: He is alert and oriented to person, place, and time. Psychiatric:         Mood and Affect: Mood normal.         Behavior: Behavior normal.         Thought Content: Thought content normal.         Judgment: Judgment normal.     ASSESSMENT/PLAN:  1. Thyroid nodule  -     IR US FINE NEEDLE ASPIRATION; Future  2. Thyromegaly  3. Pure hypercholesterolemia  4. IFG (impaired fasting glucose)  5. Coronary artery disease involving native coronary artery of native heart without angina pectoris  6. Gastroesophageal reflux disease, unspecified whether esophagitis present  7. Obesity (BMI 30-39.9)  8. Obstructive sleep apnea treated with BiPAP    -  Chronic medical problems stable  -  Continue current medications  -  Labs reviewed, ok overall  -  A1C 6.4, dietary changes and weight loss encouraged  -  Refer to IR for FNA    Return in about 1 year (around 2/15/2022).

## 2021-02-16 ENCOUNTER — TELEPHONE (OUTPATIENT)
Dept: FAMILY MEDICINE CLINIC | Age: 56
End: 2021-02-16

## 2021-02-16 NOTE — TELEPHONE ENCOUNTER
Orders for IR US fine needle aspiration faxed to special radiology at [phone]. [facility] for radiology EX: [phone] to call the office to schedule.

## 2021-02-16 NOTE — TELEPHONE ENCOUNTER
IR on VM stating US fine needle aspiration Thyroid is scheduled for 2/23/2021 at 2:00 pm.  Orders emailed to PA department. Per IR pt notified.

## 2021-02-16 NOTE — TELEPHONE ENCOUNTER
Received a VM from Hiram Avila in IR stating that the patient is scheduled for his procedure on Feb 23rd at 2:00pm and the patient is aware.

## 2021-02-23 ENCOUNTER — HOSPITAL ENCOUNTER (OUTPATIENT)
Dept: ULTRASOUND IMAGING | Age: 56
Discharge: HOME | End: 2021-02-23
Payer: COMMERCIAL

## 2021-02-23 DIAGNOSIS — E04.1 THYROID NODULE: ICD-10-CM

## 2021-02-23 PROCEDURE — 88173 CYTOPATH EVAL FNA REPORT: CPT

## 2021-02-23 PROCEDURE — 88172 CYTP DX EVAL FNA 1ST EA SITE: CPT

## 2021-02-23 PROCEDURE — 88177 CYTP FNA EVAL EA ADDL: CPT

## 2021-02-23 PROCEDURE — 60300 ASPIR/INJ THYROID CYST: CPT

## 2021-03-15 ENCOUNTER — HOSPITAL ENCOUNTER (OUTPATIENT)
Age: 56
Discharge: HOME | End: 2021-03-15

## 2021-03-15 ENCOUNTER — HOSPITAL ENCOUNTER (OUTPATIENT)
Dept: GENERAL RADIOLOGY | Age: 56
Discharge: HOME | End: 2021-03-15

## 2021-03-15 DIAGNOSIS — R52 PAIN: ICD-10-CM

## 2021-03-26 ENCOUNTER — HOSPITAL ENCOUNTER (OUTPATIENT)
Age: 56
Discharge: HOME | End: 2021-03-26

## 2021-03-26 ENCOUNTER — HOSPITAL ENCOUNTER (OUTPATIENT)
Dept: GENERAL RADIOLOGY | Age: 56
Discharge: HOME | End: 2021-03-26

## 2021-03-26 DIAGNOSIS — R52 PAIN: ICD-10-CM

## 2021-04-05 ENCOUNTER — NURSE ONLY (OUTPATIENT)
Dept: LAB | Age: 56
End: 2021-04-05

## 2021-04-06 ENCOUNTER — NURSE ONLY (OUTPATIENT)
Dept: LAB | Age: 56
End: 2021-04-06

## 2021-04-15 ENCOUNTER — HOSPITAL ENCOUNTER (OUTPATIENT)
Dept: OCCUPATIONAL THERAPY | Age: 56
Setting detail: THERAPIES SERIES
Discharge: HOME | End: 2021-04-15
Payer: COMMERCIAL

## 2021-04-15 PROCEDURE — 97035 APP MDLTY 1+ULTRASOUND EA 15: CPT

## 2021-04-15 PROCEDURE — 97165 OT EVAL LOW COMPLEX 30 MIN: CPT

## 2021-04-15 NOTE — PROGRESS NOTES
** PLEASE SIGN, DATE AND TIME CERTIFICATION BELOW AND RETURN TO [facility] OUTPATIENT REHABILITATION (FAX #: [phone]). ATTEST/CO-SIGN IF ACCESSING VIA [employer]. THANK YOU.**    I certify that I have examined the patient below and determined that Physical Medicine and Rehabilitation service is necessary and that I approve the established plan of care for up to 90 days or as specifically noted. Attestation, signature or co-signature of physician indicates approval of certification requirements.    ________________________ ____________ __________  Physician Signature   Date   Time   [address] THERAPY  [x] EVALUATION  [] DAILY NOTE (LAND) [] DAILY NOTE (AQUATIC ) [] PROGRESS NOTE [] DISCHARGE NOTE    [x] [facility]   [] [address]    [] [address] [facility]   [] [facility]    Date: 4/15/2021  Patient Name:  Juliette Hairston  : 1965  MRN: [id number]  CSN: [id number]    Referring Practitioner KANIKA Little *   Diagnosis Unspecified sprain of left wrist, initial encounter [K40.470H]    Treatment Diagnosis Decreased ROM left wrist, decreased strength   Date of Evaluation 4/15/21      Functional Outcome Measure Used UEFS   Functional Outcome Score 29 (4/15/21)       Insurance: Primary: Payor: Pete Amezcua /  /  / ,   Secondary:    Authorization Information: [facility] - approved 2-3x/week for 13 visits from 3/31/21 to 5/15/21   Visit # 1,  for progress note   Visits Allowed: 13   Recertification Date:    Physician Follow-Up: 21 with Erika GERMAIN   Physician Orders: Eval and treat   Pertinent History: In mid 2021, pt. Tripped on a skid at work and fell with left arm outstretched to catch self. Xrays on 3/26/21 showed no fracture or dislocation but did show a \"well defined sclerotic lesion at distal radius likely a benign bone island\". Pt. Was given a steroid which pt.  States has helped a little with pain. Pt. Is wearing a wrist support splint. Pt. On a 2# weight lifting restriction with left hand. SUBJECTIVE: Pt. Reports 4/10 pain in left distal radius (not so much in the wrist itself) - pt. Has a knot ~2 inches up from [facility] joint on radial side of forearm    Social/Functional History:  Medications and Allergies have been reviewed and are listed on the [facility] lives alone   Task Prior Level of Function  (current level of function addressed below)   ADLs  Independent   Ambulation Independent   Transfers Independent   Hobbies Trap shooting   Driving Active    Work [employer]. 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 Is tender over left EPL - visible bump which is tender to palpation   Observation Pt. Demo's a visible area of swelling/knot on distal left radius ~2 inches up from [facility]. Posture    Edema    Special Tests Positive Finkelstien test left hand       ADL's Painful to move left wrist when showering, painful to pull up pants, get belt belt on, painful to tie shoes, difficulty opening a jar, pushing up on hands to get up from chair   Bed Mobility     Transfers    Balance        Sensation Left Upper Extremity: Intact.    Coordination WFL           LEFT UPPER EXTREMITY  RANGE OF MOTION    AROM PROM COMMENTS         Shoulder Flexion      Shoulder Extension      Shoulder Abduction      Shoulder Adduction      Shoulder External Rotation      Shoulder Internal Rotation      Shoulder Range of Motion is [facility] [place]  [x]      Elbow Flexion      Elbow Extension      Forearm Pronation      Forearm Supination      Elbow Range of Motion is [facility] [place]  [x]      Wrist Flexion 50     Wrist Extension 58     Wrist Radial Deviation 25     Wrist Ulnar Deviation 35  Pain with ulnar deviation    Wrist Range of Motion is [facility] [place]  []   If no measurement is recorded, no formal assessment was completed for that motion. LEFT UPPER EXTREMITY  STRENGTH    Strength Rating Comments   Shoulder Flexion     Shoulder Extension     Shoulder Abduction     Shoulder Adduction     Shoulder External Rotation     Shoulder Internal Rotation     [x]  Shoulder Strength is grossly WFL. Elbow Flexion     Elbow Extension     Forearm Pronation     Forearm Supination     [x] Elbow Strength is grossly WFL. Wrist Flexion 4/5    Wrist Extension 4/5    Wrist Radial Deviation 4/5    Wrist Ulnar Deviation 4/5    []  Wrist Strength is grossly WFL. Right Left    Strength Settinnd notch 87# 70#   Pinch Strength Tip Pinch: 16 13    Lateral Pinch 21 22         If no ratings are recorded, no formal assessment was completed. TREATMENT   Precautions: 2# lifting restriction with left hand    Pain:      X in shaded column indicates Activity Completed Today   Modalities Parameters/  Location  Notes/Comments   Ultrasound to left radial wrist EPL, 3.3 MHz, 50%, 1.2 le/cm2  X                Manual Therapy Time/  Technique  Notes/Comments                     Exercises   Sets/  Sec Reps  Notes/Comments                                                    Activities Time    Notes/Comments   Instructed pt. On HEP for left wrist and thumb  X                  Specific Interventions Next Treatment: Pulsed ultrasound to left radial side of wrist/EPL, IASTM/STM to forearm/EPL, ROM and stretching to left wrist and thumb, gentle strength as able to left hand and wrist    Activity/Treatment Tolerance:  [x]  Patient tolerated treatment well  []  Patient limited by fatigue  []  Patient limited by pain   []  Patient limited by other medical complications  []  Other:     Assessment: Pt. Presents today with left wrist/radial side of forearm pain as well as left wrist and hand weakness following a fall at work several weeks ago. Pt. Was issued a wrist support splint and has been wearing it.  Xrays were negative for fracture or dislocation. Pt. Does have a \"knot\" along EPL on left wrist/forearm that is tender to palpation and pt. Demos a positive Finkelstein test.   Areas for Improvement: impaired ROM, impaired strength and pain  Prognosis: good    GOALS:  Patient Goal: To decrease pain in left wrist for return to full work duties    Short Term Goals:  Time Frame: 6 visits  1. Pt. Will demo understanding of HEP for left hand and wrist to facilitate improved motion, strength and decreased pain for return to full work duties  2. Pt. Will demo improved left wrist AROM to flexion= 65, extension= 65 for ease with bathing and dressing  3. Pt. Will report decreased pain in left wrist to 3/10 or less with active use during bathing and dressing    Long Term Goals:  Time Frame: 12 visits  1. Pt. Will demo improved left wrist strength to 5/5 without pain for ability to push up on left hand to get up from chair  2. Pt. Will demo improved left hand strength to = 80# for ease with opening jars      Patient Education:   [x]  HEP/Education Completed: Plan of Care, Goals, encouraged pt. To removed splint 3x/day to do ROM and stretching exercises and then ice his wrist for 10-15 minutes at lunch break and in the evening at home to decrease inflammation and pain; explained purpose of ultrasound   Medbridge for HEP: Wrist AROM flexion/ext/UD/RD, thumb circles, thumb cross palm add/abduction, thumb IP flexion/extension, EPL stretch  []  No new Education completed  []  Reviewed Prior HEP      [x]  Patient verbalized and/or demonstrated understanding of education provided. []  Patient unable to verbalize and/or demonstrate understanding of education provided. Will continue education. [x]  Barriers to learning: none    PLAN:  Treatment Recommendations: Strengthening, Range of Motion, Manual Therapy - Soft Tissue Mobilization and Home Exercise Program    [x]  Plan of care initiated.   Plan to see patient 2-3 times per week for 12 visits through May 15, 2021 to address the treatment planned outlined above.   []  Continue with current plan of care  []  Modify plan of care as follows:    []  Hold pending physician visit  []  Discharge    Time In 1600   Time Out 1700   Timed Code Minutes: 15 min   Total Treatment Time: 60 min       Electronically Signed by: VISHNU Trujillo/MIA [id number]

## 2021-04-21 ENCOUNTER — HOSPITAL ENCOUNTER (OUTPATIENT)
Dept: OCCUPATIONAL THERAPY | Age: 56
Setting detail: THERAPIES SERIES
Discharge: HOME | End: 2021-04-21
Payer: COMMERCIAL

## 2021-04-21 PROCEDURE — 97035 APP MDLTY 1+ULTRASOUND EA 15: CPT

## 2021-04-21 PROCEDURE — 97110 THERAPEUTIC EXERCISES: CPT

## 2021-04-21 NOTE — PROGRESS NOTES
[address] THERAPY  [] EVALUATION  [x] DAILY NOTE (LAND) [] DAILY NOTE (AQUATIC ) [] PROGRESS NOTE [] DISCHARGE NOTE    [x] OUTPATIENT REHABILITATION [facility]   [] [place] [address]    [] [facility]   [] Siddhartha Yepez    Date: 2021  Patient Name:  Devin Morrell  : 1965  MRN: [id number]  CSN: [id number]    Referring Practitioner KANIKA Yates *   Diagnosis Unspecified sprain of left wrist, initial encounter [A83.637R]    Treatment Diagnosis Decreased ROM left wrist, decreased strength   Date of Evaluation 4/15/21      Functional Outcome Measure Used UEFS   Functional Outcome Score 29 (4/15/21)       Insurance: Primary: Payor: Manny Perez /  /  / ,   Secondary:    Authorization Information: [facility] - approved 2-3x/week for 13 visits from 3/31/21 to 5/15/21   Visit # 2,  for progress note   Visits Allowed: 13   Recertification Date:    Physician Follow-Up: 21 with Yoshi BOUDREAUX-CNP   Physician Orders: Eval and treat   Pertinent History: In mid 2021, pt. Tripped on a skid at work and fell with left arm outstretched to catch self. Xrays on 3/26/21 showed no fracture or dislocation but did show a \"well defined sclerotic lesion at distal radius likely a benign bone island\". Pt. Was given a steroid which pt. States has helped a little with pain. Pt. Is wearing a wrist support splint. Pt. On a 2# weight lifting restriction with left hand. SUBJECTIVE: Pt. Reports 2/10 pain throughout the L wrist (not so much in the wrist itself) - pt.  Has a knot ~2 inches up from [address] joint on radial side of forearm  TREATMENT   Precautions: 2# lifting restriction with left hand    Pain:  Reports pain remains 2-3 /10    X in shaded column indicates Activity Completed Today   Modalities Parameters/  Location  Notes/Comments   Ultrasound to left radial wrist EPL, 3.3 MHz, 50%, 1.2 le/cm2  X                Manual Therapy at home to decrease inflammation and pain; explained purpose of ultrasound   Medbridge for HEP: Wrist AROM flexion/ext/UD/RD, thumb circles, thumb cross palm add/abduction, thumb IP flexion/extension, EPL stretch  []  No new Education completed  []  Reviewed Prior HEP      [x]  Patient verbalized and/or demonstrated understanding of education provided. []  Patient unable to verbalize and/or demonstrate understanding of education provided. Will continue education. [x]  Barriers to learning: none    PLAN:  Treatment Recommendations: Strengthening, Range of Motion, Manual Therapy - Soft Tissue Mobilization and Home Exercise Program    [x]  Plan of care initiated. Plan to see patient 2-3 times per week for 12 visits through May 15, 2021 to address the treatment planned outlined above.   [x]  Continue with current plan of care  []  Modify plan of care as follows:    []  Hold pending physician visit  []  Discharge    Time In 1553   Time Out 1637   Timed Code Minutes: 44 min   Total Treatment Time: 44 min     CPT CODE TIME-IN TIME-OUT TOTAL TIME   59247 1553 1601 8 minutes   47562 1601 1637 36 minutes                TOTAL SESSION 1553 1637 44 minutes       Electronically Signed by:  TIERNEY Gupta OTR/L [id number]

## 2021-04-23 ENCOUNTER — OFFICE VISIT (OUTPATIENT)
Dept: FAMILY MEDICINE CLINIC | Age: 56
End: 2021-04-23
Payer: COMMERCIAL

## 2021-04-23 VITALS
SYSTOLIC BLOOD PRESSURE: 128 MMHG | BODY MASS INDEX: 46.84 KG/M2 | WEIGHT: 315 LBS | HEART RATE: 76 BPM | DIASTOLIC BLOOD PRESSURE: 76 MMHG | RESPIRATION RATE: 16 BRPM

## 2021-04-23 DIAGNOSIS — B07.9 VIRAL WARTS, UNSPECIFIED TYPE: Primary | ICD-10-CM

## 2021-04-23 PROCEDURE — 17110 DESTRUCTION B9 LES UP TO 14: CPT | Performed by: FAMILY MEDICINE

## 2021-04-23 ASSESSMENT — ENCOUNTER SYMPTOMS
RESPIRATORY NEGATIVE: 1
GASTROINTESTINAL NEGATIVE: 1
ROS SKIN COMMENTS: WART ON FINGER

## 2021-04-23 NOTE — PROGRESS NOTES
Julia Bojorquez (:  1965) is a 60 y.o. male,Established patient, here for evaluation of the following chief complaint(s):  Finger Pain (left hand, middle finger with growth on side)      SUBJECTIVE/OBJECTIVE:  HPI:    Chief Complaint   Patient presents with    Finger Pain     left hand, middle finger with growth on side     Pt c/o lesion to left middle finger for the last year. Has cut off with razor blade but it keeps coming back. Patient Active Problem List   Diagnosis    Asthma    Gastroesophageal reflux disease    Hyperlipidemia    History of smoking    Chronic back pain    S/P angioplasty    Groin hematoma    Testosterone deficiency    Joint pain    Arthritis    Atypical chest pain    Family history of premature CAD    Hypogonadism, male    Thyroid nodule    History of erectile dysfunction    Male sexual dysfunction    Heart disease    Chest pain with moderate risk of acute coronary syndrome    Coronary atherosclerosis    S/P PTCA: 2015: FFR-guided stenting of distal and mid-LAD using Xience Alpine 2.5X15, post-2.77, and Alpine 2.75X18 mm, post-3.18 mm.  Obesity (BMI 30-39. 9)    Obstructive sleep apnea treated with BiPAP     Past Surgical History:   Procedure Laterality Date    APPENDECTOMY      BACK SURGERY      X 2 FOR HERNIATED DISCS    CARDIOVASCULAR STRESS TEST  3 2010    Mild chest discomfort induced by IV Lexiscan that resolved spontaneously. No arrhythmias. No EKG changes to suggest ischemia.  CARPAL TUNNEL RELEASE      COLONOSCOPY      CORONARY ANGIOPLASTY      X 2 STENTS    DIAGNOSTIC CARDIAC CATH LAB PROCEDURE  2010    Successful drug-eluting stent x 2 of mid LAD. LV borderline normal LV function. EF 50-55%. No wall motion abnormalities detected and no MR. Normal hemodynamics. Coronaries - diffuse left anterior descending (LAD) disease with proximal 50% and distal around 70%.     [facility]    right    KNEE SURGERY 1980    left    LYMPH NODE BIOPSY  2011    neck    SHOULDER ARTHROSCOPY     [facility] SUR[facility]ERY  ,     UPPER GASTROINTESTINAL ENDOSCOPY      US THYROID CYST ASPIRATION AND OR INJECTION  2021    US THYROID CYST ASPIRATION AND OR INJECTION 2021 [facility] ULTRASOUND     Social History     Tobacco Use    Smoking status: Former Smoker     Packs/day: 1.00     Years: 14.00     Pack years: 14.00     Types: Cigarettes     Quit date: 8/3/2002     Years since quittin.7    Smokeless tobacco: Current User     Types: Chew    Tobacco comment: Patient states, \"he chews tobacco.\"    Substance Use Topics    Alcohol use: Yes     Alcohol/week: 0.0 standard drinks     Comment: Patient states, \"seldom. \"    Drug use: No         Review of Systems   Constitutional: Negative. HENT: Negative. Respiratory: Negative. Cardiovascular: Negative. Gastrointestinal: Negative. Musculoskeletal: Negative. Skin:        Wart on finger     All other systems reviewed and are negative. Physical Exam  Vitals signs and nursing note reviewed. Constitutional:       General: He is not in acute distress. Appearance: Normal appearance. He is well-developed. HENT:      Head: Normocephalic and atraumatic. Right Ear: Tympanic membrane normal.      Left Ear: Tympanic membrane normal.   Eyes:      Conjunctiva/sclera: Conjunctivae normal.   Neck:      Musculoskeletal: Neck supple. Cardiovascular:      Rate and Rhythm: Normal rate and regular rhythm. Heart sounds: Normal heart sounds. No murmur. Pulmonary:      Effort: Pulmonary effort is normal.      Breath sounds: Normal breath sounds. No wheezing, rhonchi or rales. Abdominal:      General: There is no distension. Musculoskeletal:        Hands:    Skin:     General: Skin is warm and dry. Findings: No rash (on exposed surfaces). Neurological:      General: No focal deficit present. Mental Status: He is alert.    Psychiatric: Attention and Perception: Attention normal.         Mood and Affect: Mood normal.         Speech: Speech normal.         Behavior: Behavior normal. Behavior is cooperative. Thought Content: Thought content normal.         Judgment: Judgment normal.       ASSESSMENT/PLAN:  1. Viral warts, unspecified type  -     43466 - [place] DESTRUCTION BENIGN LESIONS UP TO 14    -  Freeze/thaw cycle x 2 with cryotherapy   -  Home instructions given    Return if symptoms worsen or fail to improve. An electronic signature was used to authenticate this note.     --Kristen Cole, DO

## 2021-04-23 NOTE — PROGRESS NOTES
Visit Information    Have you changed or started any medications since your last visit including any over-the-counter medicines, vitamins, or herbal medicines? no   Are you having any side effects from any of your medications? -  no  Have you stopped taking any of your medications? Is so, why? -  no    Have you seen any other physician or provider since your last visit? No  Have you had any other diagnostic tests since your last visit? No  Have you been seen in the emergency room and/or had an admission to a hospital since we last saw you? No  Have you had your routine dental cleaning in the past 6 months? yes - 3/2021    Have you activated your [employer] account? If not, what are your barriers?  Yes     Patient Care Team:  Bert Maradiaga,  as PCP - General  Bert Maradiaga, DO as PCP - [facility] Provider    Medical History Review  Past Medical, Family, and Social History reviewed and does not contribute to the patient presenting condition    Health Maintenance   Topic Date Due    Hepatitis C screen  Never done    Pneumococcal 0-64 years Vaccine (1 of 1 - PPSV23) 01/21/1969    HIV screen  Never done    COVID-19 Vaccine (1) Never done    DTaP/Tdap/Td vaccine (1 - Tdap) Never done    Shingles Vaccine (1 of 2) Never done    Colon cancer screen colonoscopy  Never done    Flu vaccine (Season Ended) 09/01/2021    A1C test (Diabetic or Prediabetic)  02/11/2022    Lipid screen  02/11/2022    Hepatitis A vaccine  Aged Out    Hepatitis B vaccine  Aged Out    Hib vaccine  Aged Out    Meningococcal (ACWY) vaccine  Aged Out

## 2021-04-23 NOTE — PATIENT INSTRUCTIONS
You may receive a survey about your visit with us today. The feedback from our patients helps us identify what is working well and where the service to all patients can be enhanced. Thank you! Tobacco Cessation Programs     Telephonic behavior modification  Luci Weathers ([phone])   Counseling service for those who are ready to quit using tobacco.     Available for uninsured [place] residents, [place] recipients, pregnant women, or patients whose health plans or employers are members of the [address] [place] [address] behavior modification   http://[place]. Quitlogix. org   Online support program to help patients through each step of the quitting process. Available 24 hours a day 7 days a week. Provides up to date researched based tool, step-by-step guides, and motivational messages. Online behavior modification   www.lungusa.org/stop-smoking/how-to-quit   HelpLine: [facility] (073-7591)   Email questions to:  Kaydennitza@Ever[employer]ge. MailMeNetwork    Website offers resources to help tobacco users figure out their reasons for quitting and then take the big step of quitting for good. Hypnosis   Location: [facility], [facility].[place], [place]   Contact: Gisell Casey, PhD at [phone]   Hypnosis for tobacco cessation   Cost $225 for the initial session and $175 for each session afterwards. Most patients require 6-8 sessions. There is the option to submit through the patients insurance. Hypnosis and behavior modification   Location: [place] [address],  [address] [employer].[place], [place]   Contact: Abigail Guidry, PhD at [phone]  [facility] [facility] and hypnosis for nicotine addition   Cost: For uninsured patients:  Please call above phone number  Cost for insured patients depends on patients insurance plan.     Behavior modification   Location: [facility], [address] [facility] [address] [employer] [address], [address]: [place] [address] include four one-on-one appointments between the patient and a

## 2021-04-27 ENCOUNTER — HOSPITAL ENCOUNTER (OUTPATIENT)
Dept: OCCUPATIONAL THERAPY | Age: 56
Setting detail: THERAPIES SERIES
Discharge: HOME | End: 2021-04-27
Payer: COMMERCIAL

## 2021-04-27 PROCEDURE — 97110 THERAPEUTIC EXERCISES: CPT

## 2021-04-27 PROCEDURE — 97035 APP MDLTY 1+ULTRASOUND EA 15: CPT

## 2021-04-27 NOTE — PROGRESS NOTES
[address] THERAPY  [] EVALUATION  [x] DAILY NOTE (LAND) [] DAILY NOTE (AQUATIC )   [] PROGRESS NOTE [] DISCHARGE NOTE    [x] OUTPATIENT REHABILITATION [facility]   [] [place] [address]    [] [facility]   [] Lina Do    Date: 2021  Patient Name:  Arden Navarro  : 1965  MRN: [id number]  CSN: [id number]    Referring Practitioner KANIKA Baumann *   Diagnosis Unspecified sprain of left wrist, initial encounter [D40.609Z]    Treatment Diagnosis Decreased ROM left wrist, decreased strength   Date of Evaluation 4/15/21      Functional Outcome Measure Used UEFS   Functional Outcome Score 29 (4/15/21)       Insurance: Primary: Payor: Wilfred Lagos /  /  / ,   Secondary:    Authorization Information: [facility] - approved 2-3x/week for 13 visits from 3/31/21 to 5/15/21   Visit # 3, 3/13 for progress note   Visits Allowed: 13   Recertification Date:    Physician Follow-Up: 21 with Sharyle  APRN-CNP   Physician Orders: Eval and treat   Pertinent History: In mid 2021, pt. Tripped on a skid at work and fell with left arm outstretched to catch self. Xrays on 3/26/21 showed no fracture or dislocation but did show a \"well defined sclerotic lesion at distal radius likely a benign bone island\". Pt. Was given a steroid which pt. States has helped a little with pain. Pt. Is wearing a wrist support splint. Pt. On a 2# weight lifting restriction with left hand. SUBJECTIVE: Patient reports his wrist/thumb is not aching like it had been. Exercises are going well at home, continues to have a pulling pain with ulnar deviation.     TREATMENT   Precautions: 2# lifting restriction with left hand    Pain:  1/10 \"ache\"    X in shaded column indicates Activity Completed Today   Modalities Parameters/  Location  Notes/Comments   Ultrasound to left radial wrist EPL, 3.3 MHz, 50%, 1.2 le/cm2  x                Manual Therapy Time/  Technique Notes/Comments   IASTM to left radial wrist EPL and up forearm   x                Exercises   Sets/  Sec Reps  Notes/Comments   PROM to L wrist all motions with digits straight and flexed to tolerance    x    AROM R wrist flexion/extension with hand fisted then extended digits, UD, RD,Wrist circles clockwise, counterclockwise 1 10 ea x    AROM L 1, IP and CMC flexion, palmar and radial ABD 1 10 x    Full Fisting ex with wrist stabilized  1 10 x                                Activities Time    Notes/Comments   Instructed pt. On HEP for left wrist and thumb   Reviewed with Pt without concerns. Specific Interventions Next Treatment: Pulsed ultrasound to left radial side of wrist/EPL, IASTM/STM to forearm/EPL, ROM and stretching to left wrist and thumb, gentle strength as able to left hand and wrist    Activity/Treatment Tolerance:  [x]  Patient tolerated treatment well  []  Patient limited by fatigue  []  Patient limited by pain   []  Patient limited by other medical complications  []  Other:     Assessment: Patient is progressing towards goals. Areas for Improvement: impaired ROM, impaired strength and pain  Prognosis: good    GOALS:  Patient Goal: To decrease pain in left wrist for return to full work duties    Short Term Goals:  Time Frame: 6 visits  1. Pt. Will demo understanding of HEP for left hand and wrist to facilitate improved motion, strength and decreased pain for return to full work duties  2. Pt. Will demo improved left wrist AROM to flexion= 65, extension= 65 for ease with bathing and dressing  3. Pt. Will report decreased pain in left wrist to 3/10 or less with active use during bathing and dressing    Long Term Goals:  Time Frame: 12 visits  1. Pt. Will demo improved left wrist strength to 5/5 without pain for ability to push up on left hand to get up from chair  2.  Pt. Will demo improved left hand strength to = 80# for ease with opening jars      Patient Education:   []

## 2021-04-30 ENCOUNTER — HOSPITAL ENCOUNTER (OUTPATIENT)
Dept: OCCUPATIONAL THERAPY | Age: 56
Setting detail: THERAPIES SERIES
Discharge: HOME | End: 2021-04-30
Payer: COMMERCIAL

## 2021-04-30 PROCEDURE — 97110 THERAPEUTIC EXERCISES: CPT

## 2021-04-30 PROCEDURE — 97035 APP MDLTY 1+ULTRASOUND EA 15: CPT

## 2021-04-30 NOTE — PROGRESS NOTES
Edema massage to the left wrist/forearm to decrease edema  x          Exercises   Sets/  Sec Reps  Notes/Comments   PROM to L wrist all motions with digits straight and flexed to tolerance    x    AROM R wrist flexion/extension with hand fisted then extended digits, UD, RD,Wrist circles clockwise, counterclockwise 1 10 ea x    AROM L 1, IP and CMC flexion, palmar and radial ABD, opposition 1 10 x    Full Fisting ex with wrist stabilized  1 10                                 Activities Time    Notes/Comments   Instructed pt. On HEP for left wrist and thumb   Reviewed with Pt without concerns. Specific Interventions Next Treatment: Pulsed ultrasound to left radial side of wrist/EPL, IASTM/STM to forearm/EPL, ROM and stretching to left wrist and thumb, gentle strength as able to left hand and wrist    Activity/Treatment Tolerance:  [x]  Patient tolerated treatment well  []  Patient limited by fatigue  []  Patient limited by pain   []  Patient limited by other medical complications  []  Other:     Assessment: Patient is progressing towards goals. Continues with pain, limited activity to AROM and PROM. Areas for Improvement: impaired ROM, impaired strength and pain  Prognosis: good    GOALS:  Patient Goal: To decrease pain in left wrist for return to full work duties    Short Term Goals:  Time Frame: 6 visits  1. Pt. Will demo understanding of HEP for left hand and wrist to facilitate improved motion, strength and decreased pain for return to full work duties  2. Pt. Will demo improved left wrist AROM to flexion= 65, extension= 65 for ease with bathing and dressing  3. Pt. Will report decreased pain in left wrist to 3/10 or less with active use during bathing and dressing    Long Term Goals:  Time Frame: 12 visits  1. Pt. Will demo improved left wrist strength to 5/5 without pain for ability to push up on left hand to get up from chair  2.  Pt. Will demo improved left hand strength to = 80# for ease with opening jars      Patient Education:   []  HEP/Education Completed: Plan of Care, Goals, encouraged pt. To removed splint 3x/day to do ROM and stretching exercises and then ice his wrist for 10-15 minutes at lunch break and in the evening at home to decrease inflammation and pain; explained purpose of ultrasound   Medbridge for HEP: Wrist AROM flexion/ext/UD/RD, thumb circles, thumb cross palm add/abduction, thumb IP flexion/extension, EPL stretch  []  No new Education completed  [x]  Reviewed Prior HEP      [x]  Patient verbalized and/or demonstrated understanding of education provided. []  Patient unable to verbalize and/or demonstrate understanding of education provided. Will continue education. [x]  Barriers to learning: none    PLAN:  Treatment Recommendations: Strengthening, Range of Motion, Manual Therapy - Soft Tissue Mobilization and Home Exercise Program    []  Plan of care initiated. Plan to see patient 2-3 times per week for 12 visits through May 15, 2021 to address the treatment planned outlined above.   [x]  Continue with current plan of care  []  Modify plan of care as follows:    []  Hold pending physician visit  []  Discharge    Time In 1600   Time Out 1635   Timed Code Minutes: 35 min   Total Treatment Time: 35 min     CPT CODE TIME-IN TIME-OUT TOTAL TIME   [place] [id number]   [place] [address] [address] [place] [address], OTR/L 1039

## 2021-05-03 ENCOUNTER — HOSPITAL ENCOUNTER (OUTPATIENT)
Dept: OCCUPATIONAL THERAPY | Age: 56
Setting detail: THERAPIES SERIES
Discharge: HOME | End: 2021-05-03
Payer: COMMERCIAL

## 2021-05-03 PROCEDURE — 97035 APP MDLTY 1+ULTRASOUND EA 15: CPT

## 2021-05-03 PROCEDURE — 97110 THERAPEUTIC EXERCISES: CPT

## 2021-05-03 NOTE — PROGRESS NOTES
wrist EPL and up forearm   x    Edema massage to the left wrist/forearm to decrease edema   No edema noted this date. Removed kinesiotape for US. Did not reapply 5-03         Exercises   Sets/  Sec Reps  Notes/Comments   PROM to L wrist all motions with digits straight and flexed to tolerance    x Achieved full ROM with only minimal pain into extension. AROM R wrist flexion/extension with hand fisted then extended digits, UD, RD,Wrist circles clockwise, counterclockwise 1 10 ea x    AROM L 1, IP and CMC flexion, palmar and radial ABD, opposition 1 10 x    Full Fisting ex with wrist stabilized  1 10     1 # wt for wrist flexion, extension, UD, RD ,  2 10 x    ergogripper with 1 red, 2 blue and 1 Green rubber band,  2 10 x    Red flexer bar for palm up/ palm down  1 10 x           Activities Time    Notes/Comments   Instructed pt. On HEP for left wrist and thumb   Reviewed with Pt without concerns. Specific Interventions Next Treatment: Pulsed ultrasound to left radial side of wrist/EPL, IASTM/STM to forearm/EPL, ROM and stretching to left wrist and thumb, gentle strength as able to left hand and wrist    Activity/Treatment Tolerance:  [x]  Patient tolerated treatment well  []  Patient limited by fatigue  []  Patient limited by pain   []  Patient limited by other medical complications  []  Other:     Assessment: Patient is progressing towards goals. Continues with minimal pain with activities, initiated light strengthening this date   Areas for Improvement: impaired ROM, impaired strength and pain  Prognosis: good    GOALS:  Patient Goal: To decrease pain in left wrist for return to full work duties    Short Term Goals:  Time Frame: 6 visits  1. Pt. Will demo understanding of HEP for left hand and wrist to facilitate improved motion, strength and decreased pain for return to full work duties  2.  Pt. Will demo improved left wrist AROM to flexion= 65, extension= 65 for ease with bathing and dressing  3. Pt. Will report decreased pain in left wrist to 3/10 or less with active use during bathing and dressing    Long Term Goals:  Time Frame: 12 visits  1. Pt. Will demo improved left wrist strength to 5/5 without pain for ability to push up on left hand to get up from chair  2. Pt. Will demo improved left hand strength to = 80# for ease with opening jars      Patient Education:   []  HEP/Education Completed: Plan of Care, Goals, encouraged pt. To removed splint 3x/day to do ROM and stretching exercises and then ice his wrist for 10-15 minutes at lunch break and in the evening at home to decrease inflammation and pain; explained purpose of ultrasound   Medbridge for HEP: Wrist AROM flexion/ext/UD/RD, thumb circles, thumb cross palm add/abduction, thumb IP flexion/extension, EPL stretch  []  No new Education completed  [x]  Reviewed Prior HEP      [x]  Patient verbalized and/or demonstrated understanding of education provided. []  Patient unable to verbalize and/or demonstrate understanding of education provided. Will continue education. [x]  Barriers to learning: none    PLAN:  Treatment Recommendations: Strengthening, Range of Motion, Manual Therapy - Soft Tissue Mobilization and Home Exercise Program    []  Plan of care initiated. Plan to see patient 2-3 times per week for 12 visits through May 15, 2021 to address the treatment planned outlined above.   [x]  Continue with current plan of care  []  Modify plan of care as follows:    []  Hold pending physician visit  []  Discharge    Time In 1607   Time Out 1647   Timed Code Minutes: 40 min   Total Treatment Time: 40 min     CPT CODE TIME-IN TIME-OUT TOTAL TIME   14354 2648 4458 8 minutes   [phone] 32 minutes                TOTAL SESSION 1607 1647 40 minutes        TIERNEY Junior OTR/L 0986

## 2021-05-05 ENCOUNTER — APPOINTMENT (OUTPATIENT)
Dept: OCCUPATIONAL THERAPY | Age: 56
End: 2021-05-05
Payer: COMMERCIAL

## 2021-05-06 ENCOUNTER — HOSPITAL ENCOUNTER (OUTPATIENT)
Dept: OCCUPATIONAL THERAPY | Age: 56
Setting detail: THERAPIES SERIES
Discharge: HOME | End: 2021-05-06
Payer: COMMERCIAL

## 2021-05-06 PROCEDURE — 97035 APP MDLTY 1+ULTRASOUND EA 15: CPT

## 2021-05-06 PROCEDURE — 97110 THERAPEUTIC EXERCISES: CPT

## 2021-05-06 NOTE — PROGRESS NOTES
[address]  [] EVALUATION  [x] DAILY NOTE (LAND) [] DAILY NOTE (AQUATIC )   [] PROGRESS NOTE [] DISCHARGE NOTE    [x] OUTPATIENT REHABILITATION [facility]   [] [place] [address]    [] [facility]   [] Ana Montaño    Date: 2021  Patient Name:  Drea Trevino  : 1965  MRN: [id number]  CSN: [id number]    Referring Practitioner KANIKA Monroe *   Diagnosis Unspecified sprain of left wrist, initial encounter [V22.273W]    Treatment Diagnosis Decreased ROM left wrist, decreased strength   Date of Evaluation 4/15/21      Functional Outcome Measure Used UEFS   Functional Outcome Score 29 (4/15/21)       Insurance: Primary: Payor: Janel Clinton /  /  / ,   Secondary:    Authorization Information: [facility] - approved 2-3x/week for 13 visits from 3/31/21 to 5/15/21   Visit # 6,  for progress note   Visits Allowed: 13   Recertification Date:    Physician Follow-Up: 21 with Jackeline BOUDREAUX-CNP   Physician Orders: Eval and treat   Pertinent History: In mid 2021, pt. Tripped on a skid at work and fell with left arm outstretched to catch self. Xrays on 3/26/21 showed no fracture or dislocation but did show a \"well defined sclerotic lesion at distal radius likely a benign bone island\". Pt. Was given a steroid which pt. States has helped a little with pain. Pt. Is wearing a wrist support splint. Pt. On a 2# weight lifting restriction with left hand. SUBJECTIVE: Patients that buttoning pants, tying shoes and wringing out washcloth still bother his hand.       TREATMENT   Precautions: 2# lifting restriction with left hand    Pain:  1-2 /10 with strengthening ex     X in shaded column indicates Activity Completed Today   Modalities Parameters/  Location  Notes/Comments   Ultrasound to left radial wrist EPL, 3.3 MHz, 50%, 1.2 le/cm2  x                Manual Therapy Time/  Technique  Notes/Comments   STM to left radial wrist EPL and up forearm   x    Edema massage to the left wrist/forearm to decrease edema  x Kinesiotape applied for radial side of wrist to mid forearm due to min edema noted . Exercises   Sets/  Sec Reps  Notes/Comments   PROM to L wrist all motions with digits straight and flexed to tolerance     Achieved full ROM with only minimal pain into extension. AROM R wrist flexion/extension with hand fisted then extended digits, UD, RD,Wrist circles clockwise, counterclockwise 1 10 ea x    AROM L 1, IP and CMC flexion, palmar and radial ABD, opposition 1 10 x Min pulling/ pain with L 1 to base of L 5. Full Fisting ex with wrist stabilized  1 10 x    1 # wt for wrist flexion, extension, UD, RD ,  2 10     ergogripper with 1 red, 2 blue and 1 Green rubber band,  2 10 x    Red flexer bar for palm up/ palm down  1 10     Light resistence theraputty for gentle grasp with wrist flexion / extension, UD RD 1 10  x    Activities Time    Notes/Comments   Instructed pt. On HEP for left wrist and thumb   Reviewed with Pt without concerns. Specific Interventions Next Treatment: Pulsed ultrasound to left radial side of wrist/EPL, IASTM/STM to forearm/EPL, ROM and stretching to left wrist and thumb, gentle strength as able to left hand and wrist    Activity/Treatment Tolerance:  [x]  Patient tolerated treatment well  []  Patient limited by fatigue  []  Patient limited by pain   []  Patient limited by other medical complications  []  Other:     Assessment: Patient is progressing towards goals. Continues with minimal pain with activities, initiated light strengthening this date   Areas for Improvement: impaired ROM, impaired strength and pain  Prognosis: good    GOALS:  Patient Goal: To decrease pain in left wrist for return to full work duties    Short Term Goals:  Time Frame: 6 visits  1. Pt.  Will demo understanding of HEP for left hand and wrist to facilitate improved motion, strength and decreased pain for return to full work duties  2. Pt. Will demo improved left wrist AROM to flexion= 65, extension= 65 for ease with bathing and dressing  3. Pt. Will report decreased pain in left wrist to 3/10 or less with active use during bathing and dressing    Long Term Goals:  Time Frame: 12 visits  1. Pt. Will jerilyno improved left wrist strength to 5/5 without pain for ability to push up on left hand to get up from chair  2. Pt. Will demo improved left hand strength to = 80# for ease with opening jars      Patient Education:   []  HEP/Education Completed: Plan of Care, Goals, encouraged pt. To removed splint 3x/day to do ROM and stretching exercises and then ice his wrist for 10-15 minutes at lunch break and in the evening at home to decrease inflammation and pain; explained purpose of ultrasound   Medbridge for HEP: Wrist AROM flexion/ext/UD/RD, thumb circles, thumb cross palm add/abduction, thumb IP flexion/extension, EPL stretch  []  No new Education completed  [x]  Reviewed Prior HEP      [x]  Patient verbalized and/or demonstrated understanding of education provided. []  Patient unable to verbalize and/or demonstrate understanding of education provided. Will continue education. [x]  Barriers to learning: none    PLAN:  Treatment Recommendations: Strengthening, Range of Motion, Manual Therapy - Soft Tissue Mobilization and Home Exercise Program    []  Plan of care initiated. Plan to see patient 2-3 times per week for 12 visits through May 15, 2021 to address the treatment planned outlined above.   [x]  Continue with current plan of care  []  Modify plan of care as follows:    []  Hold pending physician visit  []  Discharge      CPT CODE TIME-IN TIME-OUT TOTAL TIME   74528 1605 1613 8 minutes   84206 1613 1650 37 minutes                TOTAL SESSION 1605 1650 45 minutes        TIERNEY Rey OTR/L [id number]

## 2021-05-07 ENCOUNTER — HOSPITAL ENCOUNTER (OUTPATIENT)
Dept: OCCUPATIONAL THERAPY | Age: 56
Setting detail: THERAPIES SERIES
Discharge: HOME | End: 2021-05-07
Payer: COMMERCIAL

## 2021-05-07 PROCEDURE — 97035 APP MDLTY 1+ULTRASOUND EA 15: CPT

## 2021-05-07 PROCEDURE — 97110 THERAPEUTIC EXERCISES: CPT

## 2021-05-07 NOTE — PROGRESS NOTES
[address]  [] EVALUATION  [x] DAILY NOTE (LAND) [] DAILY NOTE (AQUATIC )   [] PROGRESS NOTE [] DISCHARGE NOTE    [x] OUTPATIENT REHABILITATION [facility]   [] [place] [address]    [] [facility]   [] Igor Rodriguez    Date: 2021  Patient Name:  Ines Erwin  : 1965  MRN: [id number]  CSN: [id number]    Referring Practitioner KANIKA Jerez *   Diagnosis Unspecified sprain of left wrist, initial encounter [S63.502A]    Treatment Diagnosis Decreased ROM left wrist, decreased strength   Date of Evaluation 4/15/21      Functional Outcome Measure Used UEFS   Functional Outcome Score 29 (4/15/21)       Insurance: Primary: Payor: Linda Hill /  /  / ,   Secondary:    Authorization Information: [facility] - approved 2-3x/week for 13 visits from 3/31/21 to 5/15/21   Visit # 7,  for progress note   Visits Allowed: 13   Recertification Date:    Physician Follow-Up: 21 with Ethan Oreilly APRN-CNP   Physician Orders: Eval and treat   Pertinent History: In mid 2021, pt. Tripped on a skid at work and fell with left arm outstretched to catch self. Xrays on 3/26/21 showed no fracture or dislocation but did show a \"well defined sclerotic lesion at distal radius likely a benign bone island\". Pt. Was given a steroid which pt. States has helped a little with pain. Pt. Is wearing a wrist support splint. Pt. On a 2# weight lifting restriction with left hand. SUBJECTIVE:  Patient is without complaints. Returns to the physician on the .       TREATMENT   Precautions: 2# lifting restriction with left hand    Pain:  2/10 radial side of the wrist     X in shaded column indicates Activity Completed Today   Modalities Parameters/  Location  Notes/Comments   Ultrasound to left radial wrist EPL, 3.3 MHz, 50%, 1.2 le/cm2  x                Manual Therapy Time/  Technique  Notes/Comments   STM to left radial wrist EPL decreased pain for return to full work duties  2. Pt. Will demo improved left wrist AROM to flexion= 65, extension= 65 for ease with bathing and dressing  3. Pt. Will report decreased pain in left wrist to 3/10 or less with active use during bathing and dressing    Long Term Goals:  Time Frame: 12 visits  1. Pt. Will jerilyno improved left wrist strength to 5/5 without pain for ability to push up on left hand to get up from chair  2. Pt. Will demo improved left hand strength to = 80# for ease with opening jars      Patient Education:   []  HEP/Education Completed: Plan of Care, Goals, encouraged pt. To removed splint 3x/day to do ROM and stretching exercises and then ice his wrist for 10-15 minutes at lunch break and in the evening at home to decrease inflammation and pain; explained purpose of ultrasound   Medbridge for HEP: Wrist AROM flexion/ext/UD/RD, thumb circles, thumb cross palm add/abduction, thumb IP flexion/extension, EPL stretch  []  No new Education completed  [x]  Reviewed Prior HEP      [x]  Patient verbalized and/or demonstrated understanding of education provided. []  Patient unable to verbalize and/or demonstrate understanding of education provided. Will continue education. [x]  Barriers to learning: none    PLAN:  Treatment Recommendations: Strengthening, Range of Motion, Manual Therapy - Soft Tissue Mobilization and Home Exercise Program    []  Plan of care initiated. Plan to see patient 2-3 times per week for 12 visits through May 15, 2021 to address the treatment planned outlined above.   [x]  Continue with current plan of care  []  Modify plan of care as follows:    []  Hold pending physician visit  []  Discharge      CPT CODE TIME-IN TIME-OUT TOTAL TIME   83620 1605 1613 8 minutes   55703 1613 1650 37 minutes                TOTAL SESSION 1605 1650 45 minutes        [place], [place], OTR/L 1141

## 2021-05-10 ENCOUNTER — HOSPITAL ENCOUNTER (OUTPATIENT)
Dept: OCCUPATIONAL THERAPY | Age: 56
Setting detail: THERAPIES SERIES
Discharge: HOME | End: 2021-05-10
Payer: COMMERCIAL

## 2021-05-10 PROCEDURE — 97035 APP MDLTY 1+ULTRASOUND EA 15: CPT

## 2021-05-10 PROCEDURE — 97110 THERAPEUTIC EXERCISES: CPT

## 2021-05-12 ENCOUNTER — HOSPITAL ENCOUNTER (OUTPATIENT)
Dept: OCCUPATIONAL THERAPY | Age: 56
Setting detail: THERAPIES SERIES
Discharge: HOME | End: 2021-05-12
Payer: COMMERCIAL

## 2021-05-12 PROCEDURE — 97035 APP MDLTY 1+ULTRASOUND EA 15: CPT

## 2021-05-12 PROCEDURE — 97110 THERAPEUTIC EXERCISES: CPT

## 2021-05-27 ENCOUNTER — OFFICE VISIT (OUTPATIENT)
Dept: FAMILY MEDICINE CLINIC | Age: 56
End: 2021-05-27
Payer: COMMERCIAL

## 2021-05-27 VITALS
HEART RATE: 84 BPM | SYSTOLIC BLOOD PRESSURE: 136 MMHG | WEIGHT: 315 LBS | RESPIRATION RATE: 20 BRPM | BODY MASS INDEX: 46.63 KG/M2 | DIASTOLIC BLOOD PRESSURE: 72 MMHG

## 2021-05-27 DIAGNOSIS — K21.9 GASTROESOPHAGEAL REFLUX DISEASE, UNSPECIFIED WHETHER ESOPHAGITIS PRESENT: ICD-10-CM

## 2021-05-27 DIAGNOSIS — B07.9 VIRAL WARTS, UNSPECIFIED TYPE: ICD-10-CM

## 2021-05-27 DIAGNOSIS — R09.82 PND (POST-NASAL DRIP): ICD-10-CM

## 2021-05-27 DIAGNOSIS — J30.2 SEASONAL ALLERGIES: ICD-10-CM

## 2021-05-27 DIAGNOSIS — R05.3 PERSISTENT COUGH FOR 3 WEEKS OR LONGER: Primary | ICD-10-CM

## 2021-05-27 DIAGNOSIS — I25.10 CORONARY ARTERY DISEASE INVOLVING NATIVE CORONARY ARTERY OF NATIVE HEART WITHOUT ANGINA PECTORIS: ICD-10-CM

## 2021-05-27 PROCEDURE — 99214 OFFICE O/P EST MOD 30 MIN: CPT | Performed by: FAMILY MEDICINE

## 2021-05-27 RX ORDER — CETIRIZINE HYDROCHLORIDE 10 MG/1
10 TABLET ORAL DAILY
Qty: 30 TABLET | Refills: 5 | Status: SHIPPED | OUTPATIENT
Start: 2021-05-27

## 2021-05-27 RX ORDER — PREDNISONE 20 MG/1
20 TABLET ORAL 2 TIMES DAILY
Qty: 10 TABLET | Refills: 0 | Status: SHIPPED | OUTPATIENT
Start: 2021-05-27 | End: 2021-06-01

## 2021-05-27 RX ORDER — FLUTICASONE PROPIONATE 50 MCG
2 SPRAY, SUSPENSION (ML) NASAL DAILY
Qty: 1 BOTTLE | Refills: 5 | Status: SHIPPED | OUTPATIENT
Start: 2021-05-27

## 2021-05-27 ASSESSMENT — ENCOUNTER SYMPTOMS
GASTROINTESTINAL NEGATIVE: 1
COUGH: 1
CHEST TIGHTNESS: 0
SHORTNESS OF BREATH: 0

## 2021-05-27 NOTE — PROGRESS NOTES
Rosemarie Flynn (:  1965) is a 60 y.o. male,Established patient, here for evaluation of the following chief complaint(s): Other (wart on finger left hand) and Cough (ongoing cough since December of last )             Subjective   SUBJECTIVE/OBJECTIVE:  HPI:    Chief Complaint   Patient presents with    Other     wart on finger left hand    Cough     ongoing cough since December of last year     Pt here with a few complaints today. Wart on left hand still present despite cryotherapy. Smaller, but still present. Also concerned about a persistent cough since Dec.  Will come and go. CXR at that time wnl. Hx of GERD, controlled. Denies chest tightness or wheezing. Admits to PND. Question allergies. Patient Active Problem List   Diagnosis    Asthma    Gastroesophageal reflux disease    Hyperlipidemia    History of smoking    Chronic back pain    S/P angioplasty    Groin hematoma    Testosterone deficiency    Joint pain    Arthritis    Atypical chest pain    Family history of premature CAD    Hypogonadism, male    Thyroid nodule    History of erectile dysfunction    Male sexual dysfunction    Heart disease    Chest pain with moderate risk of acute coronary syndrome    Coronary atherosclerosis    S/P PTCA: 2015: FFR-guided stenting of distal and mid-LAD using Xience Alpine 2.5X15, post-2.77, and Alpine 2.75X18 mm, post-3.18 mm.  Obesity (BMI 30-39. 9)    Obstructive sleep apnea treated with BiPAP     Past Surgical History:   Procedure Laterality Date    APPENDECTOMY      BACK SURGERY      X 2 FOR HERNIATED DISCS    CARDIOVASCULAR STRESS TEST  3 26 2010    Mild chest discomfort induced by IV Lexiscan that resolved spontaneously. No arrhythmias. No EKG changes to suggest ischemia.     Millie    COLONOSCOPY  2013    CORONARY ANGIOPLASTY      X 2 STENTS    DIAGNOSTIC CARDIAC CATH LAB PROCEDURE  2010    Successful drug-eluting

## 2021-05-27 NOTE — PROGRESS NOTES
Visit Information    Have you changed or started any medications since your last visit including any over-the-counter medicines, vitamins, or herbal medicines? no   Are you having any side effects from any of your medications? -  no  Have you stopped taking any of your medications? Is so, why? -  no    Have you seen any other physician or provider since your last visit? No  Have you had any other diagnostic tests since your last visit? No  Have you been seen in the emergency room and/or had an admission to a hospital since we last saw you? No  Have you had your routine dental cleaning in the past 6 months? yes - within the past 6mo    Have you activated your [employer] account? If not, what are your barriers?  Yes     Patient Care Team:  Mirian Haskins DO as PCP - [facility]  Mirian Haskins DO as PCP - [facility] Provider    Medical History Review  Past Medical, Family, and Social History reviewed and does contribute to the patient presenting condition    Health Maintenance   Topic Date Due    Hepatitis C screen  Never done    Pneumococcal 0-64 years Vaccine (1 of 2 - PPSV23) 01/21/1969    COVID-19 Vaccine (1) Never done    HIV screen  Never done    DTaP/Tdap/Td vaccine (1 - Tdap) Never done    Shingles Vaccine (1 of 2) Never done    Flu vaccine (Season Ended) 09/01/2021    A1C test (Diabetic or Prediabetic)  02/11/2022    Lipid screen  02/11/2022    Colon cancer screen colonoscopy  04/05/2031    Hepatitis A vaccine  Aged Out    Hepatitis B vaccine  Aged Out    Hib vaccine  Aged Out    Meningococcal (ACWY) vaccine  Aged Out

## 2021-06-11 ENCOUNTER — APPOINTMENT (OUTPATIENT)
Dept: GENERAL RADIOLOGY | Age: 56
End: 2021-06-11
Payer: COMMERCIAL

## 2021-06-11 ENCOUNTER — HOSPITAL ENCOUNTER (EMERGENCY)
Age: 56
Discharge: HOME | End: 2021-06-11
Payer: COMMERCIAL

## 2021-06-11 VITALS
TEMPERATURE: 99 F | HEIGHT: 72 IN | RESPIRATION RATE: 16 BRPM | BODY MASS INDEX: 42.66 KG/M2 | SYSTOLIC BLOOD PRESSURE: 145 MMHG | DIASTOLIC BLOOD PRESSURE: 65 MMHG | OXYGEN SATURATION: 96 % | WEIGHT: 315 LBS | HEART RATE: 88 BPM

## 2021-06-11 DIAGNOSIS — M17.12 TRICOMPARTMENT OSTEOARTHRITIS OF LEFT KNEE: Primary | ICD-10-CM

## 2021-06-11 PROCEDURE — 99213 OFFICE O/P EST LOW 20 MIN: CPT

## 2021-06-11 PROCEDURE — 73564 X-RAY EXAM KNEE 4 OR MORE: CPT

## 2021-06-11 PROCEDURE — 99213 OFFICE O/P EST LOW 20 MIN: CPT | Performed by: NURSE PRACTITIONER

## 2021-06-11 ASSESSMENT — PAIN DESCRIPTION - PAIN TYPE: TYPE: ACUTE PAIN

## 2021-06-11 ASSESSMENT — PAIN DESCRIPTION - FREQUENCY: FREQUENCY: CONTINUOUS

## 2021-06-11 ASSESSMENT — PAIN DESCRIPTION - LOCATION: LOCATION: KNEE

## 2021-06-11 ASSESSMENT — PAIN DESCRIPTION - ONSET: ONSET: SUDDEN

## 2021-06-11 ASSESSMENT — ENCOUNTER SYMPTOMS
COLOR CHANGE: 0
SHORTNESS OF BREATH: 0

## 2021-06-11 ASSESSMENT — PAIN DESCRIPTION - PROGRESSION: CLINICAL_PROGRESSION: NOT CHANGED

## 2021-06-11 ASSESSMENT — PAIN SCALES - GENERAL: PAINLEVEL_OUTOF10: 8

## 2021-06-11 ASSESSMENT — PAIN DESCRIPTION - ORIENTATION: ORIENTATION: LEFT

## 2021-06-11 NOTE — ED PROVIDER NOTES
[address] [employer]       Chief Complaint   Patient presents with    Knee Pain       Nurses Notes reviewed and I agree except as noted in the HPI. HISTORY OF PRESENT ILLNESS   Christy Kendall is a 60 y.o. male who presents with complaints of acute onset of left knee pain. Onset of symptoms over the past couple days, worsening. Pain is aching, continuous. Rates 8/10. No fever. No chest pain or shortness of breath. No skin color or temperature changes. History of left knee surgery in the early 1980s. Patient was injured playing tackle football. Patient states that orthopedic surgeon said he would need a new knee around age 46. He had plans on knee surgery last year when he injured his left shoulder delaying the surgery. No improvement with rest and over-the-counter medicine. REVIEW OF SYSTEMS     Review of Systems   Constitutional: Negative for fatigue and fever. Respiratory: Negative for shortness of breath. Cardiovascular: Negative for chest pain. Musculoskeletal: Positive for arthralgias and joint swelling. Skin: Negative for color change and rash. Neurological: Negative for weakness and numbness. Hematological: Does not bruise/bleed easily. Psychiatric/Behavioral: Negative for sleep disturbance. PAST MEDICAL HISTORY         Diagnosis Date    Anxiety attacks     Anxiety due to family issues.  Arthritis     Asthma     as child    Atypical chest pain     Cuevas esophagus     Chronic back pain     Coronary artery disease     S/P stent of the LAD by Dr. Antoinette Klein on 2 23 2010.  Erectile dysfunction     Family history of premature CAD     Gastroesophageal reflux disease     Groin hematoma     Herniated disc 2011    back and neck    History of erectile dysfunction 2008    History of giardia infection     History of Giardia.  History of iron deficiency     History of pulmonary embolus (PE) 1980's.     Remote 2 sprays by Each Nostril route daily    METOPROLOL SUCCINATE (TOPROL XL) 25 MG EXTENDED RELEASE TABLET    TAKE 1 TABLET TWICE A DAY    MULTIPLE VITAMINS-MINERALS (THERAPEUTIC MULTIVITAMIN-MINERALS) TABLET    Take 1 tablet by mouth daily    NITROGLYCERIN (NITROSTAT) 0.4 MG SL TABLET    Place 1 tablet under the tongue every 5 minutes as needed for Chest pain    PANTOPRAZOLE (PROTONIX) 40 MG TABLET    TAKE 1 TABLET TWICE A DAY       ALLERGIES     Patient is has No Known Allergies. FAMILY HISTORY     Patient'sfamily history includes COPD in his mother; Cancer in his father and mother; Coronary Art Dis in his father and mother; Heart Disease in his father and mother. SOCIAL HISTORY     Patient  reports that he quit smoking about 18 years ago. His smoking use included cigarettes. He has a 14.00 pack-year smoking history. His smokeless tobacco use includes chew. He reports current alcohol use. He reports that he does not use drugs. PHYSICAL EXAM     ED TRIAGE VITALS  BP: (!) 145/65, Temp: 99 °F (37.2 °C), Pulse: 88, Resp: 16, SpO2: 96 %  Physical Exam  Vitals and nursing note reviewed. Constitutional:       General: He is not in acute distress. Appearance: Normal appearance. He is well-developed. He is not ill-appearing. HENT:      Head: Normocephalic and atraumatic. Right Ear: External ear normal.      Left Ear: External ear normal.      Nose: No congestion. Eyes:      General: No scleral icterus. Conjunctiva/sclera: Conjunctivae normal.   Cardiovascular:      Pulses:           Posterior tibial pulses are 2+ on the right side and 2+ on the left side. Pulmonary:      Effort: Pulmonary effort is normal. No respiratory distress. Musculoskeletal:      Cervical back: Normal range of motion. Right knee: Normal.      Left knee: Effusion present. No bony tenderness. Decreased range of motion. Tenderness (generalized) present. Right lower leg: No edema. Left lower leg: No edema. Comments: Exam limited due to pain   Skin:     General: Skin is warm and dry. Capillary Refill: Capillary refill takes less than 2 seconds. Coloration: Skin is not jaundiced. Findings: No bruising, ecchymosis, erythema or rash. Neurological:      Mental Status: He is alert and oriented to person, place, and time. Sensory: Sensation is intact. Psychiatric:         Mood and Affect: Mood normal.         Behavior: Behavior normal. Behavior is cooperative. DIAGNOSTIC RESULTS   Labs: No results found for this visit on 06/11/21. IMAGING:  XR KNEE LEFT (MIN 4 VIEWS)   Final Result    Moderate tricompartmental osteoarthritis with moderate joint effusion. **This report has been created using voice recognition software. It may contain minor errors which are inherent in voice recognition technology. **      Final report electronically signed by Dr. Hellen Chand MD on 6/11/2021 8:47 AM        URGENT CARE COURSE:     Vitals:    06/11/21 0822   BP: (!) 145/65   Pulse: 88   Resp: 16   Temp: 99 °F (37.2 °C)   TempSrc: Oral   SpO2: 96%   Weight: (!) 330 lb (149.7 kg)   Height: 6' (1.829 m)       Medications - No data to display  PROCEDURES:  None  FINALIMPRESSION      1. Tricompartment osteoarthritis of left knee        DISPOSITION/PLAN   DISPOSITION Decision To Discharge 06/11/2021 08:56:19 AM  X-ray negative for acute process. Consistent with tricompartmental arthritis. No Baker's cyst.  No cellulitis/abscess. Aleve over-the-counter. Consider meloxicam/diclofenac. Patient states he plans to call [facility] orthopedics. If any distress go to ER. PATIENT REFERRED TO:  Abdiel Shields 1, [address]  [phone]      Follow up with PCP as needed. Follow up with Ortho. Rest, ice and elevation of leg. Crutches as needed. Aleve OTC. If any distress go to ER.     DISCHARGE MEDICATIONS:  New Prescriptions    No medications on file Current Discharge Medication List          [address] Jc Castro, APRN - CNP  06/11/21 7568

## 2021-06-14 ENCOUNTER — PATIENT MESSAGE (OUTPATIENT)
Dept: FAMILY MEDICINE CLINIC | Age: 56
End: 2021-06-14

## 2021-06-14 ENCOUNTER — TELEPHONE (OUTPATIENT)
Dept: FAMILY MEDICINE CLINIC | Age: 56
End: 2021-06-14

## 2021-06-14 RX ORDER — PANTOPRAZOLE SODIUM 40 MG/1
TABLET, DELAYED RELEASE ORAL
Qty: 180 TABLET | Refills: 3 | Status: SHIPPED | OUTPATIENT
Start: 2021-06-14 | End: 2022-08-11

## 2021-06-14 RX ORDER — METOPROLOL SUCCINATE 25 MG/1
TABLET, EXTENDED RELEASE ORAL
Qty: 180 TABLET | Refills: 3 | Status: SHIPPED | OUTPATIENT
Start: 2021-06-14 | End: 2022-07-01 | Stop reason: SDUPTHER

## 2021-06-14 NOTE — TELEPHONE ENCOUNTER
----- Message from Kahlil Smith sent at 6/11/2021  1:24 PM EDT -----  Subject: Appointment Request    Reason for Call: Urgent Joint Pain    QUESTIONS  Type of Appointment? Established Patient  Reason for appointment request? No appointments available during search  Additional Information for Provider? Patient is have knee problems. Patients knee is swollen and he believes he has fluid in his knee. No   advaible appointments. Patients needs to be seen urgently. Patient thinks   he has arthritis.   ---------------------------------------------------------------------------  --------------  CALL BACK INFO  What is the best way for the office to contact you? OK to leave message on   voicemail  Preferred Call Back Phone Number? [phone]  ---------------------------------------------------------------------------  --------------  SCRIPT ANSWERS  Relationship to Patient? Self  Appointment reason? Symptomatic  Select script based on patient symptoms? Adult Joint Pain [Arthritis,   Rheumatoid Arthritis]  Did you have an injury or trauma within the past 3 days? No  Is your joint red or swollen? Yes  Have you been diagnosed with, awaiting test results for, or told that you   are suspected of having COVID-19 (Coronavirus)? (If patient has tested   negative or was tested as a requirement for work, school, or travel and   not based on symptoms, answer no)? No  Do you currently have flu-like symptoms including fever or chills, cough,   shortness of breath, difficulty breathing, or new loss of taste or smell? No  Have you had close contact with someone with COVID-19 in the last 14 days? No  (Service Expert  click yes below to proceed with Imnish As Usual   Scheduling)?  Yes

## 2021-06-15 ENCOUNTER — OFFICE VISIT (OUTPATIENT)
Dept: FAMILY MEDICINE CLINIC | Age: 56
End: 2021-06-15
Payer: COMMERCIAL

## 2021-06-15 VITALS
HEART RATE: 84 BPM | BODY MASS INDEX: 45.9 KG/M2 | RESPIRATION RATE: 20 BRPM | SYSTOLIC BLOOD PRESSURE: 158 MMHG | WEIGHT: 315 LBS | DIASTOLIC BLOOD PRESSURE: 82 MMHG

## 2021-06-15 DIAGNOSIS — M25.562 ACUTE PAIN OF LEFT KNEE: Primary | ICD-10-CM

## 2021-06-15 DIAGNOSIS — M17.12 PRIMARY OSTEOARTHRITIS OF LEFT KNEE: ICD-10-CM

## 2021-06-15 DIAGNOSIS — M25.462 EFFUSION, LEFT KNEE: ICD-10-CM

## 2021-06-15 PROCEDURE — 99213 OFFICE O/P EST LOW 20 MIN: CPT | Performed by: FAMILY MEDICINE

## 2021-06-15 RX ORDER — TRAMADOL HYDROCHLORIDE 50 MG/1
50 TABLET ORAL EVERY 6 HOURS PRN
Qty: 20 TABLET | Refills: 0 | Status: SHIPPED | OUTPATIENT
Start: 2021-06-15 | End: 2021-06-20

## 2021-06-15 RX ORDER — NAPROXEN 500 MG/1
500 TABLET ORAL 2 TIMES DAILY WITH MEALS
Qty: 60 TABLET | Refills: 0 | Status: SHIPPED | OUTPATIENT
Start: 2021-06-15

## 2021-06-15 ASSESSMENT — ENCOUNTER SYMPTOMS
GASTROINTESTINAL NEGATIVE: 1
RESPIRATORY NEGATIVE: 1

## 2021-06-15 NOTE — PROGRESS NOTES
Visit Information    Have you changed or started any medications since your last visit including any over-the-counter medicines, vitamins, or herbal medicines? no   Are you having any side effects from any of your medications? -  no  Have you stopped taking any of your medications? Is so, why? -  no    Have you seen any other physician or provider since your last visit? Yes - Records Obtained  Have you had any other diagnostic tests since your last visit? Yes - Records Obtained  Have you been seen in the emergency room and/or had an admission to a hospital since we last saw you? No  Have you had your routine dental cleaning in the past 6 months? yes - within the past 6mo    Have you activated your [employer] account? If not, what are your barriers?  Yes     Patient Care Team:  Yovanny Patel DO as PCP - [facility] DO Amanda as PCP - [facility] Provider    Medical History Review  Past Medical, Family, and Social History reviewed and does not contribute to the patient presenting condition    Health Maintenance   Topic Date Due    Hepatitis C screen  Never done    Pneumococcal 0-64 years Vaccine (1 of 2 - PPSV23) 01/21/1969    COVID-19 Vaccine (1) Never done    HIV screen  Never done    DTaP/Tdap/Td vaccine (1 - Tdap) Never done    Shingles Vaccine (1 of 2) Never done    Flu vaccine (Season Ended) 09/01/2021    A1C test (Diabetic or Prediabetic)  02/11/2022    Lipid screen  02/11/2022    Colon cancer screen colonoscopy  04/05/2031    Hepatitis A vaccine  Aged Out    Hepatitis B vaccine  Aged Out    Hib vaccine  Aged Out    Meningococcal (ACWY) vaccine  Aged Out
with proximal 50% and distal around 70%. [address] [facility]    right    KNEE SURGERY      left    LYMPH NODE BIOPSY      neck    SHOULDER ARTHROSCOPY     [facility] SURGERY  ,     UPPER GASTROINTESTINAL ENDOSCOPY      US THYROID CYST ASPIRATION AND OR INJECTION  2021    US THYROID CYST ASPIRATION AND OR INJECTION 2021 [facility] ULTRASOUND     Social History     Tobacco Use    Smoking status: Former Smoker     Packs/day: 1.00     Years: 14.00     Pack years: 14.00     Types: Cigarettes     Quit date: 8/3/2002     Years since quittin.8    Smokeless tobacco: Current User     Types: Chew    Tobacco comment: Patient states, \"he chews tobacco.\"    Vaping Use    Vaping Use: Never used   Substance Use Topics    Alcohol use: Yes     Alcohol/week: 0.0 standard drinks     Comment: Patient states, \"seldom. \"    Drug use: No         Review of Systems   Constitutional: Negative. HENT: Negative. Respiratory: Negative. Cardiovascular: Negative. Gastrointestinal: Negative. Musculoskeletal: Positive for arthralgias (left knee pain), gait problem and joint swelling. All other systems reviewed and are negative. Objective   Physical Exam  Vitals and nursing note reviewed. Constitutional:       General: He is not in acute distress. Appearance: Normal appearance. He is well-developed. HENT:      Head: Normocephalic and atraumatic. Right Ear: Tympanic membrane normal.      Left Ear: Tympanic membrane normal.   Eyes:      Conjunctiva/sclera: Conjunctivae normal.   Cardiovascular:      Rate and Rhythm: Normal rate and regular rhythm. Heart sounds: Normal heart sounds. No murmur heard. Pulmonary:      Effort: Pulmonary effort is normal.      Breath sounds: Normal breath sounds. No wheezing, rhonchi or rales. Abdominal:      General: There is no distension. Musculoskeletal:      Cervical back: Neck supple. Left knee: Effusion present.  Decreased

## 2021-06-16 ENCOUNTER — TELEPHONE (OUTPATIENT)
Dept: FAMILY MEDICINE CLINIC | Age: 56
End: 2021-06-16

## 2021-06-16 NOTE — TELEPHONE ENCOUNTER
Pt called office requesting a return to work note to go back on Monday 6/21/21. Says he had an appt with you yesterday. He would like this to include off work days of Friday 6/11-Friday 6/18. If no call back, pt will stop by the office tomorrow to pick this up. Please advise.

## 2021-06-22 ENCOUNTER — HOSPITAL ENCOUNTER (OUTPATIENT)
Dept: OCCUPATIONAL THERAPY | Age: 56
Setting detail: THERAPIES SERIES
Discharge: HOME | End: 2021-06-22
Payer: COMMERCIAL

## 2021-06-22 PROCEDURE — 97035 APP MDLTY 1+ULTRASOUND EA 15: CPT

## 2021-06-22 PROCEDURE — 97110 THERAPEUTIC EXERCISES: CPT

## 2021-06-22 NOTE — PROGRESS NOTES
[address] THERAPY  [] EVALUATION  [x] DAILY NOTE (LAND) [] DAILY NOTE (AQUATIC )   [] PROGRESS NOTE [] DISCHARGE NOTE    [x] OUTPATIENT REHABILITATION [facility]   [] [place] [address]    [] [facility]   [] Kasandra Kocher    Date: 2021  Patient Name:  Vivian Shelton  : 1965  MRN: [id number]  CSN: [id number]    Referring Practitioner KANIKA Singh *   Diagnosis Unspecified sprain of left wrist, initial encounter [B82.919M]    Treatment Diagnosis Decreased ROM left wrist, decreased strength   Date of Evaluation 4/15/21      Functional Outcome Measure Used UEFS   Functional Outcome Score 29 (4/15/21) 37/80 on 21      Insurance: Primary: Payor: Hari Corral /  /  / ,   Secondary:    Authorization Information: [facility] - approved 2-3x/week for 13 visits from 3/31/21 to 5/15/21  RECEIVED EXTENSION FOR OT  FROM 05/15/21 TO 07/15/21. Visit # 10, 10 for progress note 21 PN completed   Visits Allowed: 13   Recertification Date: 45- submitted PN for date extension on 21   Physician Follow-Up: 21  with Godwin BOUDREAUX-CNP   Physician Orders: Eval and treat   Pertinent History: In mid 2021, pt. Tripped on a skid at work and fell with left arm outstretched to catch self. Xrays on 3/26/21 showed no fracture or dislocation but did show a \"well defined sclerotic lesion at distal radius likely a benign bone island\". Pt. Was given a steroid which pt. States has helped a little with pain. Pt. Is wearing a wrist support splint. Pt. On a 2# weight lifting restriction with left hand. SUBJECTIVE:  Patient reports back with a date extension for continued therapy. Pt. Reports difficulty with self grooming tasks, with washing his hair with B hands, the repetitive movements causes increased discomfort. Pt. Reports WB to push up out of bed or a chair, is a continued challenge.  Pt. Reports not in compliance with weight restriction last week, as he was on crutches for his knee. TREATMENT   Precautions: 2# lifting restriction with left hand    Pain:  0/10     X in shaded column indicates Activity Completed Today   Modalities Parameters/  Location  Notes/Comments   Ultrasound to left radial wrist EPL, 3.3 MHz, 100 % continuous, 1.2 le/cm2  x                Manual Therapy Time/  Technique  Notes/Comments   STM to left radial wrist EPL and up forearm   x Good tolerance with increased pressure    Edema massage to the left wrist/forearm to decrease edema   Kinesiotape applied for radial side of wrist to mid forearm due to min edema noted . Exercises   Sets/  Sec Reps  Notes/Comments   PROM to L wrist all motions with digits straight and flexed to tolerance    x Minor discomfort and hard end range present wrist flexion    AROM R wrist flexion/extension with hand fisted then extended digits, UD, RD,Wrist circles clockwise, counterclockwise 1 10 ea x    AROM L 1, IP and CMC flexion, palmar and radial ABD, opposition 1 10 x No discomfort noted throughout all AROM exercises. Full Fisting ex with wrist stabilized  1 10     2 # wt for wrist flexion, extension, UD, RD  2 10 x    ergogripper with 1 red, 2 blue and  2 Green rubber band,  2 10 x    Supination/pronation bar- one ring 1 10     Red flexer bar for palm up/ palm down  1 10     Wrist maze  5 x Minimal cues to keep elbow at side to allow for improved wrist ROM   [place] theraputty roll and pinch with each digit and thumb 1 10 x Initiated with good tolerance. No increase in discomfort noted throughout radial wrist   Light resistence theraputty for gentle grasp with wrist flexion / extension, UD RD 1 10  x    Activities Time    Notes/Comments   Instructed pt. On HEP for left wrist and thumb   Reviewed with Pt without concerns.                   Specific Interventions Next Treatment: Pulsed ultrasound to left radial side of wrist/EPL, IASTM/STM to forearm/EPL, ROM and stretching to left wrist and thumb, gentle strength as able to left hand and wrist    Activity/Treatment Tolerance:  [x]  Patient tolerated treatment well  []  Patient limited by fatigue  []  Patient limited by pain   []  Patient limited by other medical complications  []  Other:     Assessment: Patient is progressing towards goals. Patient has returned to OT, with 3 remaining visits approved after this date. Patient demonstrates good AROM wrist ROM, with mild discomfort and hard end range present PROM wrist flexion. Good tolerance noted throughout all  and strengthening, with mild discomfort noted at end of session, secondary to pt. Reference \" I just dont use it a lot with my weight restrictions\". Continue to advance with light strengthening per tolerance for preparation for RTW tasks. Areas for Improvement: impaired ROM, impaired strength and pain  Prognosis: good    GOALS:  Patient Goal: To decrease pain in left wrist for return to full work duties    Short Term Goals:  Time Frame: 6 visits  1. Pt. 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 Pt. Will demo improved left wrist AROM to flexion= 65, extension= 65 for ease with bathing and dressing NOT MET AROM R wrist flexion 60 extension 65. CONTINUE  3. Pt. 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 Long Term Goals:  Time Frame: 12 visits  1. Pt.  Will demo improved left wrist strength to 5/5 without pain for ability to push up on left hand to get up from chair NOT MET CONTINUE  2. Pt. Will demo improved left hand strength to = 80# for ease with opening jars MET 81# with pain 2/10 (on eval 70 #) REVISED

## 2021-06-25 ENCOUNTER — OFFICE VISIT (OUTPATIENT)
Dept: CARDIOLOGY CLINIC | Age: 56
End: 2021-06-25
Payer: COMMERCIAL

## 2021-06-25 VITALS
HEART RATE: 77 BPM | HEIGHT: 72 IN | SYSTOLIC BLOOD PRESSURE: 139 MMHG | WEIGHT: 315 LBS | DIASTOLIC BLOOD PRESSURE: 81 MMHG | BODY MASS INDEX: 42.66 KG/M2

## 2021-06-25 DIAGNOSIS — I25.10 CORONARY ARTERY DISEASE INVOLVING NATIVE CORONARY ARTERY OF NATIVE HEART WITHOUT ANGINA PECTORIS: ICD-10-CM

## 2021-06-25 DIAGNOSIS — R06.02 SOB (SHORTNESS OF BREATH): Primary | ICD-10-CM

## 2021-06-25 DIAGNOSIS — I10 ESSENTIAL HYPERTENSION: ICD-10-CM

## 2021-06-25 PROCEDURE — 93000 ELECTROCARDIOGRAM COMPLETE: CPT | Performed by: NUCLEAR MEDICINE

## 2021-06-25 PROCEDURE — 99213 OFFICE O/P EST LOW 20 MIN: CPT | Performed by: NUCLEAR MEDICINE

## 2021-06-25 NOTE — PROGRESS NOTES
[address] [place] [address]  Dept: [phone]  Dept Fax: [phone]  Loc: [phone]    Visit Date: 6/25/2021    Drea Trevino is a 60 y.o. male who presents todayfor:  Chief Complaint   Patient presents with    1 Year Follow Up    Cardiac Clearance     left knee surgery with Dr. Emily Moreira scheduled 8-11-21    Coronary Artery Disease    Hypertension   know LAD stent   Stress test 2018  No chest pain   No changes in breathing  No new symptoms  Bp is stable  No dizziness  No syncope  On statins   Pre DM seems fair       HPI:  HPI  Past Medical History:   Diagnosis Date    Anxiety attacks     Anxiety due to family issues.  Arthritis     Asthma     as child    Atypical chest pain     Cuevas esophagus     Chronic back pain     Coronary artery disease     S/P stent of the LAD by Dr. Divya Patterson on 2 23 2010.  Erectile dysfunction     Family history of premature CAD     Gastroesophageal reflux disease     Groin hematoma     Herniated disc 2011    back and neck    History of erectile dysfunction 2008    History of giardia infection     History of Giardia.  History of iron deficiency     History of pulmonary embolus (PE) 1980's. Remote history of pulmonary embolus and negative computed tomography for pulmonary embolus.  History of smoking     Hyperlipidemia     Well managed.  Hypogonadism, male     The patient has central hypogonadism.  Joint pain     Morbid obesity     KIRAN (obstructive sleep apnea)     S/P angioplasty     S/P PTCA: 1/12/2015: FFR-guided stenting of distal and mid-LAD using Xience Alpine 2.5X15, post-2.77, and Alpine 2.75X18 mm, post-3.18 mm.  1/12/2015 1/12/2015: FFR-guided stenting of distal and mid-LAD using Xience Alpine 2.5 X 15 mm, post-dilated to 2.77 mm, and Xience Alpine 2.75 X 18 mm, post-dilated to 3.18 mm.   Dr. Castle Pap   2/22/2010: Stenting of distal LAD using Xience 2.5 X 15 overlapping distally with Xience 2.5 X 12 mm, Dr. Steve Parks Testosterone deficiency     Thyroid nodule     The patient is euthyroid. Past Surgical History:   Procedure Laterality Date    APPENDECTOMY      BACK SURGERY      X 2 FOR HERNIATED DISCS    CARDIOVASCULAR STRESS TEST  3 26 2010    Mild chest discomfort induced by IV Lexiscan that resolved spontaneously. No arrhythmias. No EKG changes to suggest ischemia.  CARPAL TUNNEL RELEASE      COLONOSCOPY      CORONARY ANGIOPLASTY      X 2 STENTS    DIAGNOSTIC CARDIAC CATH LAB PROCEDURE  2010    Successful drug-eluting stent x 2 of mid LAD. LV borderline normal LV function. EF 50-55%. No wall motion abnormalities detected and no MR. Normal hemodynamics. Coronaries - diffuse left anterior descending (LAD) disease with proximal 50% and distal around 70%. [facility]    right    KNEE SURGERY      left    LYMPH NODE BIOPSY      neck    SHOULDER ARTHROSCOPY     [facility] SURGERY  ,     UPPER GASTROINTESTINAL ENDOSCOPY      US THYROID CYST ASPIRATION AND OR INJECTION  2021    US THYROID CYST ASPIRATION AND OR INJECTION 2021 [facility] ULTRASOUND     Family History   Problem Relation Age of Onset    Heart Disease Mother         Bypass/Surgery.  Cancer Mother         Breast cancer.  Coronary Art Dis Mother         History of CAD with myocardial infarction and open heart surgery in her 63's.  COPD Mother     Heart Disease Father         Bypass/Surgery.  Cancer Father         Leukemia.  Coronary Art Dis Father         History of CAD with myocardial infarction and open heart surgery around his 52's.      Prostate Cancer Neg Hx      Social History     Tobacco Use    Smoking status: Former Smoker     Packs/day: 1.00     Years: 14.00     Pack years: 14.00     Types: Cigarettes     Quit date: 8/3/2002     Years since quittin.9    Smokeless tobacco: Current User 02/11/2022    Colon cancer screen colonoscopy  04/05/2031    Hepatitis A vaccine  Aged Out    Hepatitis B vaccine  Aged Out    Hib vaccine  Aged Out    Meningococcal (ACWY) vaccine  Aged Out       Subjective:  Review of Systems  General:   No fever, no chills, No fatigue or weight loss  Pulmonary:    No dyspnea, no wheezing  Cardiac:    Denies recent chest pain,   GI:     No nausea or vomiting, no abdominal pain  Neuro:    No dizziness or light headedness,   Musculoskeletal:  knee issues   Extremities:   No edema, no obvious claudication       Objective:  Physical Exam  /81   Pulse 77   Ht 6' (1.829 m)   Wt (!) 345 lb 3.2 oz (156.6 kg)   BMI 46.82 kg/m²   General:   Well developed, well nourished  Lungs:   Clear to auscultation  Heart:    Normal S1 S2, Slight murmur. no rubs, no gallops  Abdomen:   Soft, non tender, no organomegalies, positive bowel sounds  Extremities:   No edema, no cyanosis, good peripheral pulses  Neurological:   Awake, alert, oriented. No obvious focal deficits  Musculoskelatal:  No obvious deformities    Assessment:      Diagnosis Orders   1. SOB (shortness of breath)  EKG 12 lead   2. Coronary artery disease involving native coronary artery of native heart without angina pectoris  EKG 12 lead   3. Essential hypertension     as above  Cardiac fair for now   ECG in office was done today. I reviewed the ECG. No acute findings      Plan:  No follow-ups on file. As above  Clear for surgery   Continue risk factor modification and medical management  Thank you for allowing me to participate in the care of your patient. Please don't hesitate to contact me regarding any further issues related to the patient care    Orders Placed:  Orders Placed This Encounter   Procedures    EKG 12 lead     Order Specific Question:   Reason for Exam?     Answer: Other       Medications Prescribed:  No orders of the defined types were placed in this encounter.          Discussed use, benefit, and side effects of prescribed medications. All patient questions answered. Pt voicedunderstanding. Instructed to continue current medications, diet and exercise. Continue risk factor modification and medical management. Patient agreed with treatment plan. Follow up as directed.     Electronically signedby Sarah Santizo MD on 6/25/2021 at 8:35 AM

## 2021-06-28 ENCOUNTER — HOSPITAL ENCOUNTER (OUTPATIENT)
Dept: OCCUPATIONAL THERAPY | Age: 56
Setting detail: THERAPIES SERIES
Discharge: HOME | End: 2021-06-28
Payer: COMMERCIAL

## 2021-06-28 PROCEDURE — 97110 THERAPEUTIC EXERCISES: CPT

## 2021-06-28 PROCEDURE — 97035 APP MDLTY 1+ULTRASOUND EA 15: CPT

## 2021-06-28 NOTE — PROGRESS NOTES
[address]  [] EVALUATION  [x] DAILY NOTE (LAND) [] DAILY NOTE (AQUATIC )   [] PROGRESS NOTE [] DISCHARGE NOTE    [x] OUTPATIENT REHABILITATION CENTER [facility]   [] [place] [address]    [] [facility]   [] Shaun Rhodes    Date: 2021  Patient Name:  Mikhail Warren  : 1965  MRN: [id number]  CSN: [id number]    Referring Practitioner KANIKA Evans *   Diagnosis Unspecified sprain of left wrist, initial encounter [F35.804Q]    Treatment Diagnosis Decreased ROM left wrist, decreased strength   Date of Evaluation 4/15/21      Functional Outcome Measure Used UEFS   Functional Outcome Score 29 (4/15/21) 37/80 on 21      Insurance: Primary: Payor: Vinay Rojas /  /  / ,   Secondary:    Authorization Information: [facility] - approved 2-3x/week for 13 visits from 3/31/21 to 5/15/21  RECEIVED EXTENSION FOR OT  FROM 05/15/21 TO 07/15/21. Visit # 6,  for progress note 21 PN completed   Visits Allowed: 13   Recertification Date: - submitted PN for date extension on 21; auth until 7-15   Physician Follow-Up: 21  with Dannie GERMAIN   Physician Orders: Eval and treat   Pertinent History: In mid 2021, pt. Tripped on a skid at work and fell with left arm outstretched to catch self. Xrays on 3/26/21 showed no fracture or dislocation but did show a \"well defined sclerotic lesion at distal radius likely a benign bone island\". Pt. Was given a steroid which pt. States has helped a little with pain. Pt. Is wearing a wrist support splint. Pt. On a 2# weight lifting restriction with left hand. SUBJECTIVE:   Reports that he is not having as much pain, except if having to lift. Reports that he avoids using the left hand during lifting activity. Encouraged to use more around the home during light tasks.     TREATMENT   Precautions: 2# lifting restriction with left hand    Pain:  0/10     X in shaded column Problem: Pain Acute  Goal: Optimal Pain Control    Intervention: Optimize Psychosocial Wellbeing  Patient presently continues to require total care at this time. Patient  Remains on tube feeding at this time at 30 cc/hr, tolerating well.  Patient medicated with Dilaudid 1 mg IVP-tolerating well at this time. Continues to rest more comfortable. Patient continues to have good output.,no bm today. Labwork drawn this am. Continue Plan of Care.         indicates Activity Completed Today   Modalities Parameters/  Location  Notes/Comments   Ultrasound to left radial wrist EPL, 3.3 MHz, 100 % continuous, 1.2 le/cm2  x Promote healing and decrease edema               Manual Therapy Time/  Technique  Notes/Comments   STM to left radial wrist EPL and up forearm    Good tolerance with increased pressure    Edema massage to the left wrist/forearm to decrease edema  x Encouraged to wean out of splint at work         Exercises   Sets/  Sec Reps  Notes/Comments   PROM to L wrist all motions with digits straight and flexed to tolerance     Minor discomfort and hard end range present wrist flexion    AROM R wrist flexion/extension with hand fisted then extended digits, UD, RD,Wrist circles clockwise, counterclockwise   x    AROM L 1, IP and CMC flexion, palmar and radial ABD, opposition 1 10  No discomfort noted throughout all AROM exercises. Full Fisting ex with wrist stabilized  1 10     2 # wt for wrist flexion, extension, UD, RD  3 10 x    ergogripper with 1 red, 3 blue and  2 Green rubber band,  3 10 x    Supination/pronation bar- one ring 3 10 x Supination stretch assists to decrease pain during supination in this task   Red flexer bar for palm up/ palm down  1 10     Wrist maze  5  Minimal cues to keep elbow at side to allow for improved wrist ROM   green theraputty roll and pinch with each digit and thumb 1 2 minutes x    Green theraputty gripping 1 2 minutes x    Activities Time    Notes/Comments   Instructed pt. On HEP for left wrist and thumb   Reviewed with Pt without concerns.                   Specific Interventions Next Treatment: Pulsed ultrasound to left radial side of wrist/EPL, IASTM/STM to forearm/EPL, ROM and stretching to left wrist and thumb, gentle strength as able to left hand and wrist    Activity/Treatment Tolerance:  [x]  Patient tolerated treatment well  []  Patient limited by fatigue  []  Patient limited by pain   []  Patient limited by other medical complications  []  Other:     Assessment: Patient is progressing toward goals. Tolerated strengthening this date without complaints or compensations. Areas for Improvement: impaired ROM, impaired strength and pain  Prognosis: good    GOALS:  Patient Goal: To decrease pain in left wrist for return to full work duties    Short Term Goals:  Time Frame: 6 visits  1. Pt. 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 Pt. Will demo improved left wrist AROM to flexion= 65, extension= 65 for ease with bathing and dressing NOT MET AROM R wrist flexion 60 extension 65. CONTINUE  3. Pt. 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 Long Term Goals:  Time Frame: 12 visits  1. Pt. Will demo improved left wrist strength to 5/5 without pain for ability to push up on left hand to get up from chair NOT MET CONTINUE  2. Pt. Will demo improved left hand strength to = 80# for ease with opening jars MET 81# with pain 2/10 (on eval 70 #) REVISED  Pt will complete gripping tasks with no increase in pain to increase ability to complete housework      Patient Education:   [x]  HEP/Education Completed: Plan of Care, Goals, encouraged pt.  To removed splint 3x/day to do ROM and stretching exercises and then ice his wrist for 10-15 minutes at lunch break and in the evening at home to decrease inflammation and pain; explained purpose of ultrasound   Medbridge for HEP: Wrist AROM flexion/ext/UD/RD, thumb circles, thumb cross palm add/abduction, thumb IP flexion/extension, EPL stretch  []  No new Education completed  [x]  Reviewed Prior HEP      [x]  Patient verbalized and/or demonstrated understanding of education provided. []  Patient unable to verbalize and/or demonstrate understanding of education provided. Will continue education. [x]  Barriers to learning: none    PLAN:  Treatment Recommendations: Strengthening, Range of Motion, Manual Therapy - Soft Tissue Mobilization and Home Exercise Program    []  Plan of care initiated. Plan to see patient 2-3 times per week for 4 visits through May 28th, 2021 to address the treatment planned outlined above.   [x]  Continue with current plan of care  []  Modify plan of care as follows:    []  Hold pending physician visit  []  Discharge      CPT CODE TIME-IN TIME-OUT TOTAL TIME   08613   1420 1428 8 minutes   45712  Ther ex 1428 1510 42 minutes    88813  Manual            TOTAL SESSION 1420 1510  50 minutes      Madeleine Ardmore, [place], OTR/L [id number]

## 2021-07-06 ENCOUNTER — HOSPITAL ENCOUNTER (OUTPATIENT)
Dept: OCCUPATIONAL THERAPY | Age: 56
Setting detail: THERAPIES SERIES
Discharge: HOME | End: 2021-07-06
Payer: COMMERCIAL

## 2021-07-06 PROCEDURE — 97035 APP MDLTY 1+ULTRASOUND EA 15: CPT

## 2021-07-06 PROCEDURE — 97110 THERAPEUTIC EXERCISES: CPT

## 2021-07-06 NOTE — PROGRESS NOTES
[address]  [] EVALUATION  [x] DAILY NOTE (LAND) [] DAILY NOTE (AQUATIC )   [] PROGRESS NOTE [] DISCHARGE NOTE    [x] OUTPATIENT REHABILITATION [facility]   [] [place] [address]    [] [facility]   [] Ca Shah    Date: 2021  Patient Name:  Shanna Nelson  : 1965  MRN: [id number]  CSN: [id number]    Referring Practitioner KANIKA Guerrero *   Diagnosis Unspecified sprain of left wrist, initial encounter [R95.718X]    Treatment Diagnosis Decreased ROM left wrist, decreased strength   Date of Evaluation 4/15/21      Functional Outcome Measure Used UEFS   Functional Outcome Score 29 (4/15/21) 37/80 on 21      Insurance: Primary: Payor: Shaggy Crabtree /  /  / ,   Secondary:    Authorization Information: [facility] - approved 2-3x/week for 13 visits from 3/31/21 to 5/15/21  RECEIVED EXTENSION FOR OT  FROM 05/15/21 TO 07/15/21. Visit # 12, 12 for progress note 21 PN completed   Visits Allowed: 13   Recertification Date: - submitted PN for date extension on 21; Alfredo Simmons until 7-15   Physician Follow-Up: 21  with Li GERMAIN   Physician Orders: Eval and treat   Pertinent History: In mid 2021, pt. Tripped on a skid at work and fell with left arm outstretched to catch self. Xrays on 3/26/21 showed no fracture or dislocation but did show a \"well defined sclerotic lesion at distal radius likely a benign bone island\". Pt. Was given a steroid which pt. States has helped a little with pain. Pt. Is wearing a wrist support splint. Pt. On a 2# weight lifting restriction with left hand. SUBJECTIVE:  Patient reports MD clearance has given him 10# lifting restriction, with fair tolerance. Patient reports MRI showed bruised bone. Patient will be returning to work on Monday, with reference he will return for one month and then be off. Patient reports he believes he is 80% PLOF for RTW tasks.      TREATMENT Precautions: 2# lifting restriction with left hand    Pain:  0/10     X in shaded column indicates Activity Completed Today   Modalities Parameters/  Location  Notes/Comments   Ultrasound to left radial wrist EPL, 3.3 MHz, 100 % continuous, 1.2 le/cm2  x Promote healing and decrease edema               Manual Therapy Time/  Technique  Notes/Comments   STM to left radial wrist EPL and up forearm    Good tolerance with increased pressure    Edema massage to the left wrist/forearm to decrease edema  x Good tolerance with PROM in addition with no pain noted at end ranges          Exercises   Sets/  Sec Reps  Notes/Comments   PROM to L wrist all motions with digits straight and flexed to tolerance     Minor discomfort and hard end range present wrist flexion    AROM R wrist flexion/extension with hand fisted then extended digits, UD, RD,Wrist circles clockwise, counterclockwise   x No pain    AROM L 1, IP and CMC flexion, palmar and radial ABD, opposition 1 10  No discomfort noted throughout all AROM exercises. Full Fisting ex with wrist stabilized  1 10     3 # wt for wrist flexion, extension, UD, RD, FA supination/pronation   3 10 x Increased weight this date to prepare for RTW. Fair tolerance    ergogripper with 1 red, 3 blue and  2 Green rubber band,  3 10 x    Prayer Stretch  15 sec 5     Supinator wrist flex/ext stretch and FA pronation/supination stretch  1 10 e x Good stretch noted at end range    Supination/pronation bar- one ring 3 10  Supination stretch assists to decrease pain during supination in this task   Green flexer bar for palm up/ palm down + twist each way   1 10 x Upgraded to green flexbar    Wrist maze  5  Minimal cues to keep elbow at side to allow for improved wrist ROM   Green theraputty taffy pulls 1 10 x Initiated this date for continued progressions with L hand strengthening. Good tolerance.     green theraputty roll and pinch with each digit and thumb 1 2 minutes x    Green theraputty gripping 1 2 minutes x    Activities Time    Notes/Comments   Instructed pt. On HEP for left wrist and thumb   Reviewed with Pt without concerns. Specific Interventions Next Treatment: Pulsed ultrasound to left radial side of wrist/EPL, IASTM/STM to forearm/EPL, ROM and stretching to left wrist and thumb, gentle strength as able to left hand and wrist    Activity/Treatment Tolerance:  [x]  Patient tolerated treatment well  []  Patient limited by fatigue  []  Patient limited by pain   []  Patient limited by other medical complications  []  Other:     Assessment: Patient is progressing toward goals. Continued strengthening progressions this date, with no complaints of pain or discomfort. Patient is comfortable with next visit being last visit at final authorization visit, along with returning to work next Monday. Patient will return to work with current restrictions, with visit to MD at the end of the month, for hopeful full clearance with work tasks. Areas for Improvement: impaired ROM, impaired strength and pain  Prognosis: good    GOALS:  Patient Goal: To decrease pain in left wrist for return to full work duties    Short Term Goals:  Time Frame: 6 visits  1. Pt. 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 Pt. Will demo improved left wrist AROM to flexion= 65, extension= 65 for ease with bathing and dressing NOT MET AROM R wrist flexion 60 extension 65. CONTINUE  3. Pt. 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 Long Term Goals:  Time Frame: 12 visits  1. Pt.  Will demo improved left wrist strength to 5/5 without pain for ability to push up on left hand to get up from chair NOT MET CONTINUE  2. Pt. Will demo improved left hand strength to = 80# for ease with opening jars MET 81# with pain 2/10 (on eval 70 #) REVISED  Pt will complete gripping tasks with no increase in pain to increase ability to complete housework      Patient Education:   [x]  HEP/Education Completed: Plan of Care, Goals, encouraged pt. To removed splint 3x/day to do ROM and stretching exercises and then ice his wrist for 10-15 minutes at lunch break and in the evening at home to decrease inflammation and pain; explained purpose of ultrasound   Medbridge for HEP: Wrist AROM flexion/ext/UD/RD, thumb circles, thumb cross palm add/abduction, thumb IP flexion/extension, EPL stretch  []  No new Education completed  [x]  Reviewed Prior HEP      [x]  Patient verbalized and/or demonstrated understanding of education provided. []  Patient unable to verbalize and/or demonstrate understanding of education provided. Will continue education. [x]  Barriers to learning: none    PLAN:  Treatment Recommendations: Strengthening, Range of Motion, Manual Therapy - Soft Tissue Mobilization and Home Exercise Program    []  Plan of care initiated. Plan to see patient 2-3 times per week for 4 visits through May 28th, 2021 to address the treatment planned outlined above.   [x]  Continue with current plan of care  []  Modify plan of care as follows:    []  Hold pending physician visit  []  Discharge      CPT CODE TIME-IN TIME-OUT TOTAL TIME   05049   1420 1428 8 minutes   60787  Ther ex 1605 16 32 minutes    52292  Manual            TOTAL SESSION 1605 1645  40 minutes      Brandon Cerna STAHL/L #[id number]

## 2021-07-08 ENCOUNTER — APPOINTMENT (OUTPATIENT)
Dept: OCCUPATIONAL THERAPY | Age: 56
End: 2021-07-08
Payer: COMMERCIAL

## 2021-07-08 ENCOUNTER — HOSPITAL ENCOUNTER (OUTPATIENT)
Dept: OCCUPATIONAL THERAPY | Age: 56
Setting detail: THERAPIES SERIES
Discharge: HOME | End: 2021-07-08
Payer: COMMERCIAL

## 2021-07-08 PROCEDURE — 97110 THERAPEUTIC EXERCISES: CPT

## 2021-07-08 PROCEDURE — 97035 APP MDLTY 1+ULTRASOUND EA 15: CPT

## 2021-07-08 NOTE — DISCHARGE SUMMARY
[address]  [] EVALUATION  [] DAILY NOTE (LAND) [] DAILY NOTE (AQUATIC )   [] PROGRESS NOTE [x] DISCHARGE NOTE    [x] OUTPATIENT REHABILITATION CENTER [facility]   [] [place] [address]    [] [facility]   [] Rolly Stinson    Date: 2021  Patient Name:  Luis M Lora  : 1965  MRN: [id number]  CSN: [id number]    Referring Practitioner KANIKA Olivera *   Diagnosis Unspecified sprain of left wrist, initial encounter [Z18.832V]    Treatment Diagnosis Decreased ROM left wrist, decreased strength   Date of Evaluation 4/15/21      Functional Outcome Measure Used UEFS   Functional Outcome Score 29 (4/15/21) 37/80 on 21      Insurance: Primary: Payor: James Mcgarry /  /  / ,   Secondary:    Authorization Information: [facility] - approved 2-3x/week for 13 visits from 3/31/21 to 5/15/21  RECEIVED EXTENSION FOR OT  FROM 05/15/21 TO 07/15/21. Visit # 13, 13 for progress note 21 PN completed   Visits Allowed: 13   Recertification Date: - submitted PN for date extension on 21; Scott Snow until 7-15   Physician Follow-Up: 21  with So GERMAIN   Physician Orders: Eval and treat   Pertinent History: In mid 2021, pt. Tripped on a skid at work and fell with left arm outstretched to catch self. Xrays on 3/26/21 showed no fracture or dislocation but did show a \"well defined sclerotic lesion at distal radius likely a benign bone island\". Pt. Was given a steroid which pt. States has helped a little with pain. Pt. Is wearing a wrist support splint. Pt. On a 2# weight lifting restriction with left hand. SUBJECTIVE:  Patient reports to last session this date, completed all [facility] approved visits. Patient will be returning to work on Monday,  With reference is is back to 80% PLOF for RTW tasks. Patient reports he has been working around the house and helping out friends, to keep busy and help with ROM and strengthening. minutes x    Green theraputty gripping 1 2 minutes x    Activities Time    Notes/Comments   Instructed pt. On HEP for left wrist and thumb   Reviewed with Pt without concerns. Goal Assessment completed for Discharge Summary   X            Specific Interventions Next Treatment: Pulsed ultrasound to left radial side of wrist/EPL, IASTM/STM to forearm/EPL, ROM and stretching to left wrist and thumb, gentle strength as able to left hand and wrist    Activity/Treatment Tolerance:  [x]  Patient tolerated treatment well  []  Patient limited by fatigue  []  Patient limited by pain   []  Patient limited by other medical complications  []  Other:     Assessment: Patient has been attending therapy for 12 weeks post work injury. Patient has shown progression and met his ROM and strengthening goals throughout his time in Occupational Therapy, completing all approved [facility] visits. Patient will currently RTW on Monday, with a 10# weight restriction until he returns to MD at the end of the month. Patient reports no pain throughout the past week with household and leisure tasks, reporting lifting and weight bearing activities are completed in a pain free manner with no discomfort. Patient reports compliance with current 10# weight restriction, with compliance with RTW tasks, knowing his limitations. Patient demonstrates this date no hard end ranges throughout PROM L wrist motions, along with pain free. Patient compliant with HEP exercises and strengthening, with all questions answered for continuation. Areas for Improvement: impaired ROM, impaired strength and pain  Prognosis: good    GOALS:  Patient Goal: To decrease pain in left wrist for return to full work duties    Short Term Goals:  Time Frame: 6 visits  1. Pt.  Will demo understanding of HEP for left hand and wrist to facilitate improved motion, strength and decreased pain for return to full work duties- GOAL MET Patient reports and all questions answered on current HEP

## 2021-07-15 ENCOUNTER — OFFICE VISIT (OUTPATIENT)
Dept: PULMONOLOGY | Age: 56
End: 2021-07-15
Payer: COMMERCIAL

## 2021-07-15 VITALS
WEIGHT: 315 LBS | OXYGEN SATURATION: 98 % | HEART RATE: 87 BPM | DIASTOLIC BLOOD PRESSURE: 80 MMHG | SYSTOLIC BLOOD PRESSURE: 124 MMHG | TEMPERATURE: 98.4 F | HEIGHT: 72 IN | BODY MASS INDEX: 42.66 KG/M2

## 2021-07-15 DIAGNOSIS — G47.33 OBSTRUCTIVE SLEEP APNEA TREATED WITH BIPAP: Primary | ICD-10-CM

## 2021-07-15 PROCEDURE — 99214 OFFICE O/P EST MOD 30 MIN: CPT | Performed by: NURSE PRACTITIONER

## 2021-07-15 ASSESSMENT — ENCOUNTER SYMPTOMS
GASTROINTESTINAL NEGATIVE: 1
RESPIRATORY NEGATIVE: 1
NAUSEA: 0
VOMITING: 0
DIARRHEA: 0
CHEST TIGHTNESS: 0
WHEEZING: 0
EYES NEGATIVE: 1
STRIDOR: 0
ALLERGIC/IMMUNOLOGIC NEGATIVE: 1

## 2021-07-15 NOTE — PROGRESS NOTES
[place] [facility]      Nura Ochoa         [id number]  7/15/2021   Chief Complaint   Patient presents with    Follow-up     Davida patient, KIRAN, 1 year follow up with a [employer] pap download        Pt of Dr. Shannon Roth    PAP Download:   Original or initial AHI: 16.8     Date of initial study: 07/13/2012      Compliant  93.3%     Noncompliant 6.7 %     PAP Type Bi-Level Level  11/15   Avg Hrs/Day 7 hours 41 minutes 4 seconds  AHI: 1.1   Recorded compliance dates , 06/08/2021  to 07/07/2021   Machine/Mfg:   [] ResMed    [x] Respironics/Dreamstation   Interface:   [] Nasal    [] Nasal pillows   [] FFM      Provider:      [x] [facility]     [][employer]     [] [employer]    [] Ahmed Jyoti    [] [place]               [] [facility] [facility]      [] [employer]    [] [address]      [] Other    Neck Size: 17  Mallampati Mallampati 4  ESS:  2  SAQLI: 91    Here is a scan of the most recent download:            Presentation:   Hardik Barton presents for sleep medicine follow up for obstructive sleep apnea  Since the last visit, Hardik Barton has been compliant with his BiPAP therapy and continues to see benefit from use. AHI controlled. Machine is on recall and patient requesting new machine. Equipment issues: The pressure is  acceptable, the mask is acceptable     Sleep issues:  Do you feel better? Yes  More rested? Yes   Better concentration? yes    Progress History:   Since last visit any new medical issues? Yes left knee surgery 8/11/21- left shoulder surgery done   New ER or hospital visits? No  Any new or changes in medicines? No  Any new sleep medicines? No    Review of Systems -   Review of Systems   Constitutional: Negative. Negative for chills, fever and unexpected weight change. HENT: Negative. Eyes: Negative. Respiratory: Negative. Negative for chest tightness, wheezing and stridor. Cardiovascular: Negative for chest pain and leg swelling. Gastrointestinal: Negative.   Negative for Plan   Do you need any equipment today? Yes     - Download reviewed and discussed with patient  - He  was advised to continue current positive airway pressure therapy with above described pressure. - He  advised to keep good compliance with current recommended pressure to get optimal results and clinical improvement  - Recommend 7-9 hours of sleep with PAP  - He was advised to call [employer] regarding supplies if needed.   -He call my office for earlier appointment if needed for worsening of sleep symptoms.   - He was instructed on weight loss  - Clemente Ruby was educated about my impression and plan. Patient verbalizesunderstanding.   We will see Juan Mckeon back in: 1 year with download  -New order placed for new BiPAP machine with same settings   -Patient to take machine with him for post operative after left knee surgery     Information added by my medical assistant/LPN was reviewed today    Electronically signed by KANIKA Luevano - CNP on 7/15/2021 at 3:32 PM

## 2021-07-20 RX ORDER — ATORVASTATIN CALCIUM 40 MG/1
TABLET, FILM COATED ORAL
Qty: 90 TABLET | Refills: 3 | Status: SHIPPED | OUTPATIENT
Start: 2021-07-20 | End: 2022-07-01 | Stop reason: SDUPTHER

## 2021-08-17 RX ORDER — NITROGLYCERIN 0.4 MG/1
0.4 TABLET SUBLINGUAL EVERY 5 MIN PRN
Qty: 25 TABLET | Refills: 3 | Status: SHIPPED | OUTPATIENT
Start: 2021-08-17 | End: 2022-07-01 | Stop reason: SDUPTHER

## 2021-08-17 NOTE — TELEPHONE ENCOUNTER
Kiarra Moserr called requesting a refill on the following medications:  Requested Prescriptions     Pending Prescriptions Disp Refills    nitroGLYCERIN (NITROSTAT) 0.4 MG SL tablet 25 tablet 3     Sig: Place 1 tablet under the tongue every 5 minutes as needed for Chest pain     Pharmacy verified:[facility]  . pv      Date of last visit:   Date of next visit (if applicable): Visit date not found

## 2021-08-25 ENCOUNTER — NURSE TRIAGE (OUTPATIENT)
Dept: OTHER | Facility: CLINIC | Age: 56
End: 2021-08-25

## 2021-08-27 ENCOUNTER — OFFICE VISIT (OUTPATIENT)
Dept: FAMILY MEDICINE CLINIC | Age: 56
End: 2021-08-27
Payer: COMMERCIAL

## 2021-08-27 ENCOUNTER — TELEPHONE (OUTPATIENT)
Dept: FAMILY MEDICINE CLINIC | Age: 56
End: 2021-08-27

## 2021-08-27 VITALS
SYSTOLIC BLOOD PRESSURE: 132 MMHG | HEART RATE: 76 BPM | DIASTOLIC BLOOD PRESSURE: 80 MMHG | RESPIRATION RATE: 20 BRPM | BODY MASS INDEX: 45.92 KG/M2 | WEIGHT: 315 LBS

## 2021-08-27 DIAGNOSIS — L72.3 SEBACEOUS CYST: Primary | ICD-10-CM

## 2021-08-27 PROCEDURE — 99213 OFFICE O/P EST LOW 20 MIN: CPT | Performed by: FAMILY MEDICINE

## 2021-08-27 RX ORDER — CEFADROXIL 500 MG/1
500 CAPSULE ORAL 2 TIMES DAILY
Qty: 14 CAPSULE | Refills: 0 | Status: SHIPPED | OUTPATIENT
Start: 2021-08-27 | End: 2021-09-03

## 2021-08-27 ASSESSMENT — ENCOUNTER SYMPTOMS
RESPIRATORY NEGATIVE: 1
GASTROINTESTINAL NEGATIVE: 1

## 2021-08-27 NOTE — PROGRESS NOTES
The patient called and is asking for the date and time of his referral to Dr. Migue Aden. The patient was updated. He voiced understanding.

## 2021-08-27 NOTE — PROGRESS NOTES
Sheri Morales (:  1965) is a 60 y.o. male,Established patient, here for evaluation of the following chief complaint(s): Mass (lump on the front part of neck noticed earlier this week, sore to touch)        Subjective   SUBJECTIVE/OBJECTIVE:  HPI:    Chief Complaint   Patient presents with    Mass     lump on the front part of neck noticed earlier this week, sore to touch     Pt here for lump on the front of his neck that he noticed over the last week. Cut it while shaving and now much larger. No drainage. No redness. Patient Active Problem List   Diagnosis    Asthma    Gastroesophageal reflux disease    Hyperlipidemia    History of smoking    Chronic back pain    S/P angioplasty    Groin hematoma    Testosterone deficiency    Joint pain    Arthritis    Atypical chest pain    Family history of premature CAD    Hypogonadism, male    Thyroid nodule    History of erectile dysfunction    Male sexual dysfunction    Heart disease    Chest pain with moderate risk of acute coronary syndrome    Coronary atherosclerosis    S/P PTCA: 2015: FFR-guided stenting of distal and mid-LAD using Xience Alpine 2.5X15, post-2.77, and Alpine 2.75X18 mm, post-3.18 mm.  Obesity (BMI 30-39. 9)    Obstructive sleep apnea treated with BiPAP     Past Surgical History:   Procedure Laterality Date    APPENDECTOMY      BACK SURGERY      X 2 FOR HERNIATED DISCS    CARDIOVASCULAR STRESS TEST  3 2010    Mild chest discomfort induced by IV Lexiscan that resolved spontaneously. No arrhythmias. No EKG changes to suggest ischemia.  CARPAL TUNNEL RELEASE      COLONOSCOPY      CORONARY ANGIOPLASTY      X 2 STENTS    DIAGNOSTIC CARDIAC CATH LAB PROCEDURE  2 2010    Successful drug-eluting stent x 2 of mid LAD. LV borderline normal LV function. EF 50-55%. No wall motion abnormalities detected and no MR. Normal hemodynamics.  Coronaries - diffuse left anterior descending (LAD) disease with proximal 50% and distal around 70%. [facility]    right    KNEE SURGERY      left    LYMPH NODE BIOPSY      neck    SHOULDER ARTHROSCOPY     [facility]  ,     UPPER GASTROINTESTINAL ENDOSCOPY      US THYROID CYST ASPIRATION AND OR INJECTION  2021    US THYROID CYST ASPIRATION AND OR INJECTION 2021 [facility]     Social History     Tobacco Use    Smoking status: Former Smoker     Packs/day: 1.00     Years: 14.00     Pack years: 14.00     Types: Cigarettes     Quit date: 8/3/2002     Years since quittin.0    Smokeless tobacco: Current User     Types: Chew    Tobacco comment: Patient states, \"he chews tobacco.\"    Vaping Use    Vaping Use: Never used   Substance Use Topics    Alcohol use: Yes     Alcohol/week: 0.0 standard drinks     Comment: Patient states, \"seldom. \"    Drug use: No         Review of Systems   Constitutional: Negative. HENT: Negative. Respiratory: Negative. Cardiovascular: Negative. Gastrointestinal: Negative. Musculoskeletal: Negative. Skin:        Lump front of neck   All other systems reviewed and are negative. Objective   Physical Exam  Vitals and nursing note reviewed. Constitutional:       General: He is not in acute distress. Appearance: Normal appearance. He is well-developed. HENT:      Head: Normocephalic and atraumatic. Right Ear: Tympanic membrane normal.      Left Ear: Tympanic membrane normal.   Eyes:      Conjunctiva/sclera: Conjunctivae normal.   Neck:     Cardiovascular:      Rate and Rhythm: Normal rate and regular rhythm. Heart sounds: Normal heart sounds. No murmur heard. Pulmonary:      Effort: Pulmonary effort is normal.      Breath sounds: Normal breath sounds. No wheezing, rhonchi or rales. Abdominal:      General: There is no distension. Musculoskeletal:      Cervical back: Neck supple. Skin:     General: Skin is warm and dry.       Findings: No rash (on exposed surfaces). Neurological:      General: No focal deficit present. Mental Status: He is alert. Psychiatric:         Attention and Perception: Attention normal.         Mood and Affect: Mood normal.         Speech: Speech normal.         Behavior: Behavior normal. Behavior is cooperative. Thought Content: Thought content normal.         Judgment: Judgment normal.               ASSESSMENT/PLAN:  1. Sebaceous cyst  Maya Sebastian MD, Plastic Surgery, [facility]    -  Referral placed  -  rx [facility] given in case becomes infected    Return if symptoms worsen or fail to improve. An electronic signature was used to authenticate this note.     --[place], DO

## 2021-08-27 NOTE — PROGRESS NOTES
Referral made to Dr. Iliana Serna Thursday 9/16/21 at 9:30am. Referral faxed to them at [phone].

## 2021-08-27 NOTE — TELEPHONE ENCOUNTER
Pt had an appt today and a referral was placed for a sebaceous cyst. Referral made to Dr. Jorge Vilchis Thursday 9/16/21 at 9:30am. Referral faxed to them at [phone].  Attempted to notify pt who was in the car driving and is going to call us back to get the appt information

## 2021-11-08 ENCOUNTER — HOSPITAL ENCOUNTER (OUTPATIENT)
Age: 56
Setting detail: SPECIMEN
Discharge: HOME | End: 2021-11-08
Payer: COMMERCIAL

## 2021-11-08 ENCOUNTER — VIRTUAL VISIT (OUTPATIENT)
Dept: FAMILY MEDICINE CLINIC | Age: 56
End: 2021-11-08
Payer: COMMERCIAL

## 2021-11-08 ENCOUNTER — TELEPHONE (OUTPATIENT)
Dept: FAMILY MEDICINE CLINIC | Age: 56
End: 2021-11-08

## 2021-11-08 DIAGNOSIS — Z20.822 ENCOUNTER FOR LABORATORY TESTING FOR COVID-19 VIRUS: ICD-10-CM

## 2021-11-08 DIAGNOSIS — B34.9 VIRAL ILLNESS: Primary | ICD-10-CM

## 2021-11-08 PROCEDURE — U0003 INFECTIOUS AGENT DETECTION BY NUCLEIC ACID (DNA OR RNA); SEVERE ACUTE RESPIRATORY SYNDROME CORONAVIRUS 2 (SARS-COV-2) (CORONAVIRUS DISEASE [COVID-19]), AMPLIFIED PROBE TECHNIQUE, MAKING USE OF HIGH THROUGHPUT TECHNOLOGIES AS DESCRIBED BY CMS-2020-01-R: HCPCS

## 2021-11-08 PROCEDURE — 99213 OFFICE O/P EST LOW 20 MIN: CPT | Performed by: FAMILY MEDICINE

## 2021-11-08 PROCEDURE — U0005 INFEC AGEN DETEC AMPLI PROBE: HCPCS

## 2021-11-08 ASSESSMENT — ENCOUNTER SYMPTOMS
GASTROINTESTINAL NEGATIVE: 1
RHINORRHEA: 1
SORE THROAT: 1
COUGH: 1

## 2021-11-08 NOTE — PROGRESS NOTES
Vanessa Razo (:  1965) is a 60 y.o. male,Established patient, here for evaluation of the following chief complaint(s): Cough and Congestion              SUBJECTIVE/OBJECTIVE:  HPI:   Chief Complaint   Patient presents with    Cough    Congestion       Pt presents today with c/o sinus pressure, congestion, ST, cough for the last 2 days. Denies chest tightness, SOB. He is not vaccinated. He did have exposure to a COVID + patient last week. Patient Active Problem List   Diagnosis    Asthma    Gastroesophageal reflux disease    Hyperlipidemia    History of smoking    Chronic back pain    S/P angioplasty    Groin hematoma    Testosterone deficiency    Joint pain    Arthritis    Atypical chest pain    Family history of premature CAD    Hypogonadism, male    Thyroid nodule    History of erectile dysfunction    Male sexual dysfunction    Heart disease    Chest pain with moderate risk of acute coronary syndrome    Coronary atherosclerosis    S/P PTCA: 2015: FFR-guided stenting of distal and mid-LAD using Xience Alpine 2.5X15, post-2.77, and Alpine 2.75X18 mm, post-3.18 mm.  Obesity (BMI 30-39. 9)    Obstructive sleep apnea treated with BiPAP     Past Surgical History:   Procedure Laterality Date    APPENDECTOMY      BACK SURGERY      X 2 FOR HERNIATED DISCS    CARDIOVASCULAR STRESS TEST  3 2010    Mild chest discomfort induced by IV Lexiscan that resolved spontaneously. No arrhythmias. No EKG changes to suggest ischemia.  CARPAL TUNNEL RELEASE      COLONOSCOPY      CORONARY ANGIOPLASTY      X 2 STENTS    DIAGNOSTIC CARDIAC CATH LAB PROCEDURE  2010    Successful drug-eluting stent x 2 of mid LAD. LV borderline normal LV function. EF 50-55%. No wall motion abnormalities detected and no MR. Normal hemodynamics. Coronaries - diffuse left anterior descending (LAD) disease with proximal 50% and distal around 70%.     [facility]    KNEE SURGERY  1980    left    LYMPH NODE BIOPSY  2011    neck    SHOULDER ARTHROSCOPY     [facility]  ,     UPPER GASTROINTESTINAL ENDOSCOPY      US THYROID CYST ASPIRATION AND OR INJECTION  2021    US THYROID CYST ASPIRATION AND OR INJECTION 2021 [facility]     Social History     Tobacco Use    Smoking status: Former Smoker     Packs/day: 1.00     Years: 14.00     Pack years: 14.00     Types: Cigarettes     Quit date: 8/3/2002     Years since quittin.2    Smokeless tobacco: Current User     Types: Chew    Tobacco comment: Patient states, \"he chews tobacco.\"    Vaping Use    Vaping Use: Never used   Substance Use Topics    Alcohol use: Yes     Alcohol/week: 0.0 standard drinks     Comment: Patient states, \"seldom. \"    Drug use: No         Review of Systems   Constitutional: Negative. Negative for fever. HENT: Positive for congestion, postnasal drip, rhinorrhea and sore throat. Respiratory: Positive for cough. Cardiovascular: Negative. Gastrointestinal: Negative. Musculoskeletal: Negative. All other systems reviewed and are negative. No flowsheet data found. Physical Exam  Constitutional:       General: He is not in acute distress. Appearance: Normal appearance. He is well-developed. He is not ill-appearing. HENT:      Head: Normocephalic and atraumatic. Right Ear: External ear normal.      Left Ear: External ear normal.   Eyes:      Conjunctiva/sclera: Conjunctivae normal.   Pulmonary:      Effort: Pulmonary effort is normal. No respiratory distress. Skin:     Findings: No rash (on exposed surfaces). Neurological:      Mental Status: He is alert and oriented to person, place, and time. Psychiatric:         Mood and Affect: Mood normal.         Behavior: Behavior normal.         Thought Content: Thought content normal.         Judgment: Judgment normal.     ASSESSMENT/PLAN:  1. Viral illness  2.  Encounter for laboratory testing for COVID-19 virus  -     COVID-19; Future    -  Check for COVID  -  Self isolate until results return  -  Symptomatic care in the meantime      Return if symptoms worsen or fail to improve. Marty Tommy, was evaluated through a synchronous (real-time) audio-video encounter. The patient (or guardian if applicable) is aware that this is a billable service. Verbal consent to proceed has been obtained within the past 12 months. The visit was conducted pursuant to the emergency declaration under the [address] [place] [address] authority and the [employer] Act. Patient identification was verified, and a caregiver was present when appropriate. The patient was located in a state where the provider was credentialed to provide care. An electronic signature was used to authenticate this note.     --Juan Jose Carreon, DO

## 2021-11-10 ENCOUNTER — TELEPHONE (OUTPATIENT)
Dept: FAMILY MEDICINE CLINIC | Age: 56
End: 2021-11-10

## 2021-11-10 LAB
SARS-COV-2: NOT DETECTED
SOURCE: NORMAL

## 2021-11-10 NOTE — TELEPHONE ENCOUNTER
Patient received my chart message that his COVID is negative. Patient wants to return to work tomorrow 11/11/21 full duty no restrictions. Asking to place work letter and COVID results at the  for  today. Please advsie.

## 2021-11-10 NOTE — LETTER
[address] [address]  Phone: [phone]  Fax: 97 Jeevanandrew Yosvany Rodriguez DO        November 10, 2021     Patient: Cheryl Barfield   YOB: 1965   Date of Visit: 11/10/2021       To Whom It May Concern: It is my medical opinion that Radha Mills may return to work on 11/11/21 with no restrictions. If you have any questions or concerns, please don't hesitate to call.     Sincerely,        Derek Mix DO

## 2021-12-03 ENCOUNTER — HOSPITAL ENCOUNTER (EMERGENCY)
Age: 56
Discharge: HOME | End: 2021-12-03
Payer: COMMERCIAL

## 2021-12-03 VITALS
SYSTOLIC BLOOD PRESSURE: 134 MMHG | DIASTOLIC BLOOD PRESSURE: 65 MMHG | HEIGHT: 72 IN | HEART RATE: 94 BPM | OXYGEN SATURATION: 96 % | BODY MASS INDEX: 42.66 KG/M2 | WEIGHT: 315 LBS | TEMPERATURE: 97.1 F | RESPIRATION RATE: 18 BRPM

## 2021-12-03 DIAGNOSIS — J40 BRONCHITIS: Primary | ICD-10-CM

## 2021-12-03 LAB
FLU A ANTIGEN: NEGATIVE
FLU B ANTIGEN: NEGATIVE
SARS-COV-2, NAA: NOT  DETECTED

## 2021-12-03 PROCEDURE — 87635 SARS-COV-2 COVID-19 AMP PRB: CPT

## 2021-12-03 PROCEDURE — 99213 OFFICE O/P EST LOW 20 MIN: CPT

## 2021-12-03 PROCEDURE — 87804 INFLUENZA ASSAY W/OPTIC: CPT

## 2021-12-03 PROCEDURE — 99213 OFFICE O/P EST LOW 20 MIN: CPT | Performed by: NURSE PRACTITIONER

## 2021-12-03 RX ORDER — AMOXICILLIN AND CLAVULANATE POTASSIUM 875; 125 MG/1; MG/1
1 TABLET, FILM COATED ORAL 2 TIMES DAILY
Qty: 14 TABLET | Refills: 0 | Status: SHIPPED | OUTPATIENT
Start: 2021-12-03 | End: 2021-12-08

## 2021-12-03 RX ORDER — PREDNISONE 20 MG/1
20 TABLET ORAL 2 TIMES DAILY
Qty: 10 TABLET | Refills: 0 | Status: SHIPPED | OUTPATIENT
Start: 2021-12-03 | End: 2021-12-07 | Stop reason: ALTCHOICE

## 2021-12-03 RX ORDER — BROMPHENIRAMINE MALEATE, PSEUDOEPHEDRINE HYDROCHLORIDE, AND DEXTROMETHORPHAN HYDROBROMIDE 2; 30; 10 MG/5ML; MG/5ML; MG/5ML
5 SYRUP ORAL 4 TIMES DAILY PRN
Qty: 118 ML | Refills: 0 | Status: SHIPPED | OUTPATIENT
Start: 2021-12-03 | End: 2021-12-08 | Stop reason: SDUPTHER

## 2021-12-03 ASSESSMENT — ENCOUNTER SYMPTOMS
VOMITING: 0
SORE THROAT: 0
RHINORRHEA: 0
SHORTNESS OF BREATH: 1
CHEST TIGHTNESS: 0
NAUSEA: 0
COUGH: 1
DIARRHEA: 1

## 2021-12-03 NOTE — ED PROVIDER NOTES
[address]  Urgent Care Encounter       CHIEF COMPLAINT       Chief Complaint   Patient presents with    Fever    Cough    Diarrhea       Nurses Notes reviewed and I agree except as noted in the HPI. HISTORY OF PRESENT ILLNESS   Tammy Mcintyre is a 60 y.o. male who presents to the [facility] urgent care for evaluation of fever, cough, and diarrhea. He reports that his symptoms started yesterday. He reports his fever as high as 101.4. He reports cough, diarrhea, frontal headache, and mild dyspnea on exertion. He denies loss of taste or smell. He denies nausea and vomiting. He denies nasal congestion, rhinorrhea, postnasal drainage. He does report that he has been exposed to influenza a at work. The history is provided by the patient. No  was used. REVIEW OF SYSTEMS     Review of Systems   Constitutional: Positive for fever. Negative for activity change, appetite change, chills and fatigue. HENT: Negative for ear discharge, ear pain, rhinorrhea and sore throat. Respiratory: Positive for cough and shortness of breath. Negative for chest tightness. Cardiovascular: Negative for chest pain. Gastrointestinal: Positive for diarrhea. Negative for nausea and vomiting. Genitourinary: Negative for dysuria. Skin: Negative for rash. Allergic/Immunologic: Negative for environmental allergies and food allergies. Neurological: Positive for headaches. Negative for dizziness. PAST MEDICAL HISTORY         Diagnosis Date    Anxiety attacks     Anxiety due to family issues.  Arthritis     Asthma     as child    Atypical chest pain     Cuevas esophagus     Chronic back pain     Coronary artery disease     S/P stent of the LAD by Dr. Rosy Pierre on 2 23 2010.     Erectile dysfunction     Family history of premature CAD     Gastroesophageal reflux disease     Groin hematoma     Herniated disc 2011    back and neck    History of erectile dysfunction 2008    History of giardia infection     History of Giardia.  History of iron deficiency     History of pulmonary embolus (PE) 1980's. Remote history of pulmonary embolus and negative computed tomography for pulmonary embolus.  History of smoking     Hyperlipidemia     Well managed.  Hypogonadism, male     The patient has central hypogonadism.  Joint pain     Morbid obesity     KIRAN (obstructive sleep apnea)     S/P angioplasty     S/P PTCA: 1/12/2015: FFR-guided stenting of distal and mid-LAD using Xience Alpine 2.5X15, post-2.77, and Alpine 2.75X18 mm, post-3.18 mm.  1/12/2015 1/12/2015: FFR-guided stenting of distal and mid-LAD using Xience Alpine 2.5 X 15 mm, post-dilated to 2.77 mm, and Xience Alpine 2.75 X 18 mm, post-dilated to 3.18 mm. Dr. Sruthi Mejia   2/22/2010: Stenting of distal LAD using Xience 2.5 X 15 overlapping distally with Xience 2.5 X 12 mm, Dr. Vaughn Go Testosterone deficiency     Thyroid nodule     The patient is euthyroid. SURGICALHISTORY     Patient  has a past surgical history that includes back surgery; Carpal tunnel release (1980); Elbow surgery (1980); knee surgery (1980); Appendectomy; Coronary angioplasty; cardiovascular stress test (3 26 2010); Diagnostic Cardiac Cath Lab Procedure (2 22 2010); lymph node biopsy (2011); Spine surgery (1993, 2000); Upper gastrointestinal endoscopy (2013); Colonoscopy (2013); Shoulder arthroscopy; and US ASP/INJ THYROID CYST (2/23/2021). CURRENT MEDICATIONS       Previous Medications    ALBUTEROL SULFATE  (90 BASE) MCG/ACT INHALER    Inhale 2 puffs into the lungs every 4 hours as needed for Wheezing or Shortness of Breath    ASPIRIN 81 MG TABLET    Take 81 mg by mouth daily.     ATORVASTATIN (LIPITOR) 40 MG TABLET    TAKE 1 TABLET EVERY OTHER DAY    CETIRIZINE (ZYRTEC) 10 MG TABLET    Take 1 tablet by mouth daily    CPAP MACHINE MISC    by Does not apply route Please check patient CPAP machine for proper functioning. To be set at BiPAP 15/11 cwp. CYANOCOBALAMIN (VITAMIN B 12 PO)    Take 1,000 mg by mouth. FLUTICASONE (FLONASE) 50 MCG/ACT NASAL SPRAY    2 sprays by Each Nostril route daily    METOPROLOL SUCCINATE (TOPROL XL) 25 MG EXTENDED RELEASE TABLET    TAKE 1 TABLET TWICE A DAY    MULTIPLE VITAMINS-MINERALS (THERAPEUTIC MULTIVITAMIN-MINERALS) TABLET    Take 1 tablet by mouth daily    NAPROXEN (NAPROSYN) 500 MG TABLET    Take 1 tablet by mouth 2 times daily (with meals)    NITROGLYCERIN (NITROSTAT) 0.4 MG SL TABLET    Place 1 tablet under the tongue every 5 minutes as needed for Chest pain    PANTOPRAZOLE (PROTONIX) 40 MG TABLET    TAKE 1 TABLET TWICE A DAY       ALLERGIES     Patient is has No Known Allergies. Patients   Immunization History   Administered Date(s) Administered    Pneumococcal Conjugate 7-valent ([place] Sep) 10/29/2011       FAMILY HISTORY     Patient's family history includes COPD in his mother; Cancer in his father and mother; Coronary Art Dis in his father and mother; Heart Disease in his father and mother. SOCIAL HISTORY     Patient  reports that he quit smoking about 19 years ago. His smoking use included cigarettes. He has a 14.00 pack-year smoking history. His smokeless tobacco use includes chew. He reports current alcohol use. He reports that he does not use drugs. PHYSICAL EXAM     ED TRIAGE VITALS  BP: 134/65, Temp: 97.1 °F (36.2 °C), Pulse: 94, Resp: 18, SpO2: 96 %,Estimated body mass index is 44.76 kg/m² as calculated from the following:    Height as of this encounter: 6' (1.829 m). Weight as of this encounter: 330 lb (149.7 kg). ,No LMP for male patient. Physical Exam  Vitals and nursing note reviewed. Constitutional:       General: He is not in acute distress. Appearance: Normal appearance. He is not ill-appearing, toxic-appearing or diaphoretic. HENT:      Head: Normocephalic.       Right Ear: Ear canal and external ear normal.      Left Ear: Ear canal and external ear normal.      Nose: Nose normal. No congestion or rhinorrhea. Mouth/Throat:      Mouth: Mucous membranes are moist.      Pharynx: Oropharynx is clear. Posterior oropharyngeal erythema present. No oropharyngeal exudate. Cardiovascular:      Rate and Rhythm: Normal rate. Pulses: Normal pulses. Pulmonary:      Effort: Pulmonary effort is normal. No respiratory distress. Breath sounds: No stridor. No wheezing or rhonchi. Abdominal:      General: Abdomen is flat. Bowel sounds are normal.      Palpations: Abdomen is soft. Musculoskeletal:         General: No swelling or tenderness. Normal range of motion. Cervical back: Normal range of motion. Neurological:      General: No focal deficit present. Mental Status: He is alert and oriented to person, place, and time. Psychiatric:         Mood and Affect: Mood normal.         Behavior: Behavior normal.         DIAGNOSTIC RESULTS     Labs:  Results for orders placed or performed during the hospital encounter of 12/03/21   COVID-19, Rapid   Result Value Ref Range    SARS-CoV-2, RYAN NOT  DETECTED NOT DETECTED   Rapid influenza A/B antigens   Result Value Ref Range    Flu A Antigen Negative NEGATIVE    Flu B Antigen Negative NEGATIVE       IMAGING:    No orders to display         EKG: None      URGENT CARE COURSE:     Vitals:    12/03/21 1216   BP: 134/65   Pulse: 94   Resp: 18   Temp: 97.1 °F (36.2 °C)   TempSrc: Temporal   SpO2: 96%   Weight: (!) 330 lb (149.7 kg)   Height: 6' (1.829 m)       Medications - No data to display         PROCEDURES:  None    FINAL IMPRESSION      1. Bronchitis          DISPOSITION/ PLAN     Patient seen and evaluated for the above symptoms. A rapid influenza and Covid test were obtained and negative. Patient symptoms consistent with likely bronchitis. He is provided prescription for Augmentin, Bromfed-DM, and prednisone.   Instructed use over-the-counter Tylenol and Motrin for pain or fever.  Instructed to follow-up with PCP in 3 to 5 days to worsening symptoms. He is agreeable to above plan and denies questions or concerns at this time.       PATIENT REFERRED TO:  DO Farrah Kiran, [address] / [facility] [address]      DISCHARGE MEDICATIONS:  New Prescriptions    AMOXICILLIN-CLAVULANATE (AUGMENTIN) 875-125 MG PER TABLET    Take 1 tablet by mouth 2 times daily for 7 days    BROMPHENIRAMINE-PSEUDOEPHEDRINE-DM 2-30-10 MG/5ML SYRUP    Take 5 mLs by mouth 4 times daily as needed for Congestion or Cough    PREDNISONE (DELTASONE) 20 MG TABLET    Take 1 tablet by mouth 2 times daily for 5 days       Discontinued Medications    No medications on file       Current Discharge Medication List          KANIKA Franks CNP    (Please note that portions of this note were completed with a voice recognition program. Efforts were made to edit the dictations but occasionally words are mis-transcribed.)           KANIKA Franks CNP  12/03/21 [id number]

## 2021-12-03 NOTE — Clinical Note
Luke Fraga was seen and treated in our emergency department on 12/3/2021. He may return to work on 12/06/2021. May be off work one to two days if needed     If you have any questions or concerns, please don't hesitate to call.       Edward Longo, APRN - CNP

## 2021-12-03 NOTE — ED NOTES
Patient verbalized understanding of discharge instructions and medications prescribed. Denies questions or concerns at this time.       Jennifer Leon RN  12/03/21 [id number]

## 2021-12-06 ENCOUNTER — TELEPHONE (OUTPATIENT)
Dept: FAMILY MEDICINE CLINIC | Age: 56
End: 2021-12-06

## 2021-12-07 ENCOUNTER — HOSPITAL ENCOUNTER (EMERGENCY)
Age: 56
Discharge: HOME | End: 2021-12-07
Payer: COMMERCIAL

## 2021-12-07 VITALS
DIASTOLIC BLOOD PRESSURE: 69 MMHG | OXYGEN SATURATION: 97 % | RESPIRATION RATE: 18 BRPM | TEMPERATURE: 97.4 F | HEIGHT: 72 IN | SYSTOLIC BLOOD PRESSURE: 150 MMHG | WEIGHT: 315 LBS | BODY MASS INDEX: 42.66 KG/M2 | HEART RATE: 80 BPM

## 2021-12-07 DIAGNOSIS — U07.1 COVID-19 VIRUS INFECTION: Primary | ICD-10-CM

## 2021-12-07 LAB — SARS-COV-2, NAA: DETECTED

## 2021-12-07 PROCEDURE — 99213 OFFICE O/P EST LOW 20 MIN: CPT

## 2021-12-07 PROCEDURE — 99213 OFFICE O/P EST LOW 20 MIN: CPT | Performed by: NURSE PRACTITIONER

## 2021-12-07 PROCEDURE — 87635 SARS-COV-2 COVID-19 AMP PRB: CPT

## 2021-12-07 ASSESSMENT — ENCOUNTER SYMPTOMS
NAUSEA: 0
VOMITING: 0
SHORTNESS OF BREATH: 0
DIARRHEA: 0
SORE THROAT: 0
COUGH: 1

## 2021-12-07 ASSESSMENT — PAIN DESCRIPTION - FREQUENCY: FREQUENCY: CONTINUOUS

## 2021-12-07 ASSESSMENT — PAIN DESCRIPTION - PAIN TYPE: TYPE: ACUTE PAIN

## 2021-12-07 ASSESSMENT — PAIN DESCRIPTION - PROGRESSION: CLINICAL_PROGRESSION: GRADUALLY WORSENING

## 2021-12-07 ASSESSMENT — PAIN DESCRIPTION - LOCATION: LOCATION: HEAD

## 2021-12-07 ASSESSMENT — PAIN DESCRIPTION - DESCRIPTORS: DESCRIPTORS: ACHING

## 2021-12-07 ASSESSMENT — PAIN DESCRIPTION - ONSET: ONSET: PROGRESSIVE

## 2021-12-07 ASSESSMENT — PAIN SCALES - GENERAL: PAINLEVEL_OUTOF10: 4

## 2021-12-07 NOTE — ED TRIAGE NOTES
Concern for COVID. Pt states he was neg flu and covid last Friday, but last night and this morning he started to have chills, no taste or smell and a cough. Rapid covid was collected and sent to lab.

## 2021-12-07 NOTE — ED PROVIDER NOTES
reflux disease     Groin hematoma     Herniated disc 2011    back and neck    History of erectile dysfunction 2008    History of giardia infection     History of Giardia.  History of iron deficiency     History of pulmonary embolus (PE) 1980's. Remote history of pulmonary embolus and negative computed tomography for pulmonary embolus.  History of smoking     Hyperlipidemia     Well managed.  Hypogonadism, male     The patient has central hypogonadism.  Joint pain     Morbid obesity     KIRAN (obstructive sleep apnea)     S/P angioplasty     S/P PTCA: 1/12/2015: FFR-guided stenting of distal and mid-LAD using Xience Alpine 2.5X15, post-2.77, and Alpine 2.75X18 mm, post-3.18 mm.  1/12/2015 1/12/2015: FFR-guided stenting of distal and mid-LAD using Xience Alpine 2.5 X 15 mm, post-dilated to 2.77 mm, and Xience Alpine 2.75 X 18 mm, post-dilated to 3.18 mm. Dr. Jennifer Mata   2/22/2010: Stenting of distal LAD using Xience 2.5 X 15 overlapping distally with Xience 2.5 X 12 mm, Dr. Dion Ahumada Testosterone deficiency     Thyroid nodule     The patient is euthyroid. SURGICALHISTORY     Patient  has a past surgical history that includes back surgery; Carpal tunnel release (1980); Elbow surgery (1980); knee surgery (1980); Appendectomy; Coronary angioplasty; cardiovascular stress test (3 26 2010); Diagnostic Cardiac Cath Lab Procedure (2 22 2010); lymph node biopsy (2011); Spine surgery (1993, 2000); Upper gastrointestinal endoscopy (2013); Colonoscopy (2013); Shoulder arthroscopy; and US ASP/INJ THYROID CYST (2/23/2021). CURRENT MEDICATIONS       Previous Medications    ALBUTEROL SULFATE  (90 BASE) MCG/ACT INHALER    Inhale 2 puffs into the lungs every 4 hours as needed for Wheezing or Shortness of Breath    AMOXICILLIN-CLAVULANATE (AUGMENTIN) 875-125 MG PER TABLET    Take 1 tablet by mouth 2 times daily for 7 days    ASPIRIN 81 MG TABLET    Take 81 mg by mouth daily.     ATORVASTATIN (LIPITOR) 40 MG TABLET    TAKE 1 TABLET EVERY OTHER DAY    BROMPHENIRAMINE-PSEUDOEPHEDRINE-DM 2-30-10 MG/5ML SYRUP    Take 5 mLs by mouth 4 times daily as needed for Congestion or Cough    CETIRIZINE (ZYRTEC) 10 MG TABLET    Take 1 tablet by mouth daily    CPAP MACHINE MISC    by Does not apply route Please check patient CPAP machine for proper functioning. To be set at BiPAP 15/11 cwp. CYANOCOBALAMIN (VITAMIN B 12 PO)    Take 1,000 mg by mouth. FLUTICASONE (FLONASE) 50 MCG/ACT NASAL SPRAY    2 sprays by Each Nostril route daily    METOPROLOL SUCCINATE (TOPROL XL) 25 MG EXTENDED RELEASE TABLET    TAKE 1 TABLET TWICE A DAY    MULTIPLE VITAMINS-MINERALS (THERAPEUTIC MULTIVITAMIN-MINERALS) TABLET    Take 1 tablet by mouth daily    NAPROXEN (NAPROSYN) 500 MG TABLET    Take 1 tablet by mouth 2 times daily (with meals)    NITROGLYCERIN (NITROSTAT) 0.4 MG SL TABLET    Place 1 tablet under the tongue every 5 minutes as needed for Chest pain    PANTOPRAZOLE (PROTONIX) 40 MG TABLET    TAKE 1 TABLET TWICE A DAY       ALLERGIES     Patient is has No Known Allergies. Patients   Immunization History   Administered Date(s) Administered    Pneumococcal Conjugate 7-valent (Ariel Lennox) 10/29/2011       FAMILY HISTORY     Patient's family history includes COPD in his mother; Cancer in his father and mother; Coronary Art Dis in his father and mother; Heart Disease in his father and mother. SOCIAL HISTORY     Patient  reports that he quit smoking about 19 years ago. His smoking use included cigarettes. He has a 14.00 pack-year smoking history. His smokeless tobacco use includes chew. He reports current alcohol use. He reports that he does not use drugs. PHYSICAL EXAM     ED TRIAGE VITALS  BP: (!) 150/69, Temp: 97.4 °F (36.3 °C), Pulse: 80, Resp: 18, SpO2: 97 %,Estimated body mass index is 45.16 kg/m² as calculated from the following:    Height as of this encounter: 6' (1.829 m).     Weight as of this encounter: 333 lb (151 kg). ,No LMP for male patient. Physical Exam  Vitals and nursing note reviewed. Constitutional:       General: He is not in acute distress. Appearance: He is well-developed. He is morbidly obese. He is not diaphoretic. HENT:      Right Ear: Tympanic membrane and ear canal normal.      Left Ear: Tympanic membrane and ear canal normal.      Mouth/Throat:      Mouth: Mucous membranes are moist.      Pharynx: Oropharynx is clear. Eyes:      Conjunctiva/sclera:      Right eye: Right conjunctiva is not injected. Left eye: Left conjunctiva is not injected. Pupils: Pupils are equal.   Cardiovascular:      Rate and Rhythm: Normal rate and regular rhythm. Heart sounds: No murmur heard. Pulmonary:      Effort: Pulmonary effort is normal. No respiratory distress. Breath sounds: Normal breath sounds. Musculoskeletal:      Cervical back: Normal range of motion. Right knee: Normal range of motion. Left knee: Normal range of motion. Skin:     General: Skin is warm. Findings: No rash. Neurological:      Mental Status: He is alert and oriented to person, place, and time. Psychiatric:         Behavior: Behavior normal.         DIAGNOSTIC RESULTS     Labs:  Results for orders placed or performed during the hospital encounter of 12/07/21   COVID-19, Rapid   Result Value Ref Range    SARS-CoV-2, RYAN DETECTED (AA) NOT DETECTED       IMAGING:    No orders to display         EKG:      URGENT CARE COURSE:     Vitals:    12/07/21 0929   BP: (!) 150/69   Pulse: 80   Resp: 18   Temp: 97.4 °F (36.3 °C)   TempSrc: Temporal   SpO2: 97%   Weight: (!) 333 lb (151 kg)   Height: 6' (1.829 m)       Medications - No data to display         PROCEDURES:  None    FINAL IMPRESSION      1. COVID-19 virus infection          DISPOSITION/ PLAN       Physical exam is relatively benign at this time, however the patient did test positive for coronavirus today.   I discussed that I believe his symptoms likely began 5 days ago, however he was just tested too early in the illness to show a positive test on the rapid test at that time. I discussed with the patient that he would be considered a high risk Covid patient due to his obesity, elevated blood pressure, and sleep apnea. I discussed the possibility of monoclonal antibody therapy, however the patient declines stating that he will continue doing the medications that he has been doing at home. He is given information on some vitamin supplements that he may use over-the-counter. I did offer the patient an albuterol inhaler, ever he states that he has 1 at home and will use this as needed. He is instructed that he must present directly to the emergency department if his symptoms worsen and he is agreeable to plan as discussed.     PATIENT REFERRED TO:  DO Farrah Vela, [address] / [facility] [address]      DISCHARGE MEDICATIONS:  New Prescriptions    No medications on file       Discontinued Medications    PREDNISONE (DELTASONE) 20 MG TABLET    Take 1 tablet by mouth 2 times daily for 5 days       Current Discharge Medication List          KANIKA Brothers CNP    (Please note that portions of this note were completed with a voice recognition program. Efforts were made to edit the dictations but occasionally words are mis-transcribed.)          KANIKA Brothers CNP  12/07/21 3116

## 2021-12-07 NOTE — Clinical Note
Fely Tellez was seen and treated in our emergency department on 12/7/2021. COVID19 virus is suspected. Per the CDC guidelines we recommend home isolation until the following conditions are all met:    1. At least 10 days have passed since symptoms first appeared and  2. At least 24 hours have passed since last fever without the use of fever-reducing medications and  3. Symptoms (e.g., cough, shortness of breath) have improved    If you have any questions or concerns, please don't hesitate to call.     He may return to work/school on 12/13/2021        Brain Rothman, KANIKA - CNP

## 2021-12-07 NOTE — Clinical Note
Council Kehr was seen and treated in our emergency department on 12/7/2021. COVID19 virus is suspected. Per the CDC guidelines we recommend home isolation until the following conditions are all met:    1. At least 10 days have passed since symptoms first appeared and  2. At least 24 hours have passed since last fever without the use of fever-reducing medications and  3. Symptoms (e.g., cough, shortness of breath) have improved    If you have any questions or concerns, please don't hesitate to call.     He may return to work/school on 12/13/2021        KANIKA Zhao - CNP

## 2021-12-08 ENCOUNTER — VIRTUAL VISIT (OUTPATIENT)
Dept: FAMILY MEDICINE CLINIC | Age: 56
End: 2021-12-08
Payer: COMMERCIAL

## 2021-12-08 ENCOUNTER — TELEPHONE (OUTPATIENT)
Dept: FAMILY MEDICINE CLINIC | Age: 56
End: 2021-12-08

## 2021-12-08 ENCOUNTER — CARE COORDINATION (OUTPATIENT)
Dept: CARE COORDINATION | Age: 56
End: 2021-12-08

## 2021-12-08 DIAGNOSIS — U07.1 COVID-19: Primary | ICD-10-CM

## 2021-12-08 DIAGNOSIS — U07.1 COVID-19: ICD-10-CM

## 2021-12-08 PROCEDURE — 99213 OFFICE O/P EST LOW 20 MIN: CPT | Performed by: FAMILY MEDICINE

## 2021-12-08 RX ORDER — BROMPHENIRAMINE MALEATE, PSEUDOEPHEDRINE HYDROCHLORIDE, AND DEXTROMETHORPHAN HYDROBROMIDE 2; 30; 10 MG/5ML; MG/5ML; MG/5ML
5 SYRUP ORAL 4 TIMES DAILY PRN
Qty: 118 ML | Refills: 0 | Status: SHIPPED | OUTPATIENT
Start: 2021-12-08 | End: 2022-02-11

## 2021-12-08 RX ORDER — EPINEPHRINE 1 MG/ML
0.3 INJECTION, SOLUTION, CONCENTRATE INTRAVENOUS PRN
Status: CANCELLED | OUTPATIENT
Start: 2021-12-08

## 2021-12-08 RX ORDER — SODIUM CHLORIDE 9 MG/ML
25 INJECTION, SOLUTION INTRAVENOUS PRN
OUTPATIENT
Start: 2021-12-08

## 2021-12-08 RX ORDER — ALBUTEROL SULFATE 90 UG/1
4 AEROSOL, METERED RESPIRATORY (INHALATION) PRN
Status: CANCELLED | OUTPATIENT
Start: 2021-12-08

## 2021-12-08 RX ORDER — ONDANSETRON 2 MG/ML
8 INJECTION INTRAMUSCULAR; INTRAVENOUS
Status: CANCELLED | OUTPATIENT
Start: 2021-12-08

## 2021-12-08 RX ORDER — SODIUM CHLORIDE 9 MG/ML
INJECTION, SOLUTION INTRAVENOUS CONTINUOUS
Status: CANCELLED | OUTPATIENT
Start: 2021-12-08

## 2021-12-08 RX ORDER — SODIUM CHLORIDE 0.9 % (FLUSH) 0.9 %
5-40 SYRINGE (ML) INJECTION PRN
OUTPATIENT
Start: 2021-12-08

## 2021-12-08 RX ORDER — ACETAMINOPHEN 325 MG/1
650 TABLET ORAL
Status: CANCELLED | OUTPATIENT
Start: 2021-12-08

## 2021-12-08 RX ORDER — DIPHENHYDRAMINE HYDROCHLORIDE 50 MG/ML
50 INJECTION INTRAMUSCULAR; INTRAVENOUS
Status: CANCELLED | OUTPATIENT
Start: 2021-12-08

## 2021-12-08 RX ORDER — HEPARIN SODIUM (PORCINE) LOCK FLUSH IV SOLN 100 UNIT/ML 100 UNIT/ML
500 SOLUTION INTRAVENOUS PRN
OUTPATIENT
Start: 2021-12-08

## 2021-12-08 ASSESSMENT — ENCOUNTER SYMPTOMS
GASTROINTESTINAL NEGATIVE: 1
COUGH: 1
WHEEZING: 0
SINUS PRESSURE: 1
SHORTNESS OF BREATH: 1
SINUS PAIN: 1
CHEST TIGHTNESS: 0

## 2021-12-08 NOTE — TELEPHONE ENCOUNTER
----- Message from Thea Machado DO sent at 12/8/2021 11:36 AM EST -----  VV with Jun Hale completed, please schedule Milla.

## 2021-12-08 NOTE — PROGRESS NOTES
Alexandro Essex (:  1965) is a 60 y.o. male,Established patient, here for evaluation of the following chief complaint(s): ED Follow-up              SUBJECTIVE/OBJECTIVE:  HPI:    Chief Complaint   Patient presents with    ED Follow-up       CHIEF COMPLAINT            Chief Complaint   Patient presents with    Concern For COVID-19         Nurses Notes reviewed and I agree except as noted in the HPI. HISTORY OF PRESENT ILLNESS   Alexandro Essex is a 60 y.o. male who presents for evaluation of cough, congestion, mild body aches and loss of smell and taste. Patient was seen 4 days ago for similar symptoms that had begun the day before being seen. He was tested for flu and Covid and was negative for both. States that this morning he lost smell and taste and has concerns. He states that his symptoms have not progressed and seem to be somewhat better other than the loss of smell and taste. He denies any significant shortness of breath other than \"occasionally when walking around\". Patient states that he has been taking the steroids, cough medicine and antibiotic that was prescribed in the urgent care. He denies any other medications or interventions. Received 1 dose of the vaccine, was due for 2nd on the . No fevers. Patient Active Problem List   Diagnosis    Asthma    Gastroesophageal reflux disease    Hyperlipidemia    History of smoking    Chronic back pain    S/P angioplasty    Groin hematoma    Testosterone deficiency    Joint pain    Arthritis    Atypical chest pain    Family history of premature CAD    Hypogonadism, male    Thyroid nodule    History of erectile dysfunction    Male sexual dysfunction    Heart disease    Chest pain with moderate risk of acute coronary syndrome    Coronary atherosclerosis    S/P PTCA: 2015: FFR-guided stenting of distal and mid-LAD using Xience Alpine 2.5X15, post-2.77, and Alpine 2.75X18 mm, post-3.18 mm.      Obesity (BMI 30-39. 9)    Obstructive sleep apnea treated with BiPAP     Past Surgical History:   Procedure Laterality Date    APPENDECTOMY      BACK SURGERY      X 2 FOR HERNIATED DISCS    CARDIOVASCULAR STRESS TEST  3 26 2010    Mild chest discomfort induced by IV Lexiscan that resolved spontaneously. No arrhythmias. No EKG changes to suggest ischemia.  CARPAL TUNNEL RELEASE      COLONOSCOPY      CORONARY ANGIOPLASTY      X 2 STENTS    DIAGNOSTIC CARDIAC CATH LAB PROCEDURE  2010    Successful drug-eluting stent x 2 of mid LAD. LV borderline normal LV function. EF 50-55%. No wall motion abnormalities detected and no MR. Normal hemodynamics. Coronaries - diffuse left anterior descending (LAD) disease with proximal 50% and distal around 70%. [address] [facility]    right    KNEE SURGERY      left    LYMPH NODE BIOPSY      neck    SHOULDER ARTHROSCOPY     [facility] SURGERY  ,     UPPER GASTROINTESTINAL ENDOSCOPY      US THYROID CYST ASPIRATION AND OR INJECTION  2021    US THYROID CYST ASPIRATION AND OR INJECTION 2021 [facility] ULTRASOUND     Social History     Tobacco Use    Smoking status: Former Smoker     Packs/day: 1.00     Years: 14.00     Pack years: 14.00     Types: Cigarettes     Quit date: 8/3/2002     Years since quittin.3    Smokeless tobacco: Current User     Types: Chew    Tobacco comment: Patient states, \"he chews tobacco.\"    Vaping Use    Vaping Use: Never used   Substance Use Topics    Alcohol use: Yes     Alcohol/week: 0.0 standard drinks     Comment: Patient states, \"seldom. \"    Drug use: No         Review of Systems   Constitutional: Negative. Negative for fever. HENT: Positive for congestion, sinus pressure and sinus pain. Respiratory: Positive for cough and shortness of breath. Negative for chest tightness and wheezing. Cardiovascular: Negative. Gastrointestinal: Negative. Musculoskeletal: Negative.     All other systems reviewed

## 2021-12-08 NOTE — CARE COORDINATION
Patient contacted regarding COVID-19 diagnosis and monoclonal antibody infusion follow up. Discussed COVID-19 related testing which was available at this time. Test results were positive. Patient informed of results, if available? Yes. Ambulatory Care Manager contacted the patient by telephone to perform post discharge assessment. Call within 2 business days of discharge: Yes. Verified name and  with patient as identifiers. Provided introduction to self, and explanation of the CTN/ACM role, and reason for call due to risk factors for infection and/or exposure to COVID-19. Symptoms reviewed with patient who verbalized the following symptoms: fever, pain or aching joints, cough, loss of taste or smell, chills or shaking and congestion. Due to no new or worsening symptoms encounter was not routed to provider for escalation. Discussed follow-up appointments. If no appointment was previously scheduled, appointment scheduling offered: Yes. [address] follow up appointment(s):   Future Appointments   Date Time Provider Kiana Barrera   2021 11:30 AM [facility]o DO [facility]   2022 11:00 AM Shira Bobo DO [facility] [facility]   2022  8:45 AM Mario Kwan MD [facility] [place]   2022  2:00 PM KANIKA Jones     [facility] follow up appointment(s): NA    Non-face-to-face services provided:  Obtained and reviewed discharge summary and/or continuity of care documents     Advance Care Planning:   Does patient have an Advance Directive:  reviewed and current. Educated patient about risk for severe COVID-19 due to risk factors according to CDC guidelines. ACM reviewed discharge instructions, medical action plan and red flag symptoms with the patient who verbalized understanding. Discussed COVID vaccination status: Yes. Education provided on COVID-19 vaccination as appropriate.  Discussed exposure protocols and Sarah Yadav with [employer] called stating patient's left heel is soft and a little red. She has a huge incontinence issue at night but hardly urinates during the day but at night constantly urinates in the bed.      Please call to advise quarantine with CDC Guidelines. Patient was given an opportunity to verbalize any questions and concerns and agrees to contact ACM or health care provider for questions related to their healthcare. Reviewed and educated patient on any new and changed medications related to discharge diagnosis     Was patient discharged with a pulse oximeter? No Discussed and confirmed pulse oximeter discharge instructions and when to notify provider or seek emergency care. ACM provided contact information. Plan for follow-up call in 5-7 days based on severity of symptoms and risk factors. VV today with PCP for monoclonal antibody orders. Advised on zone sheet, symptom management, reporting worsening symptoms, deep breathing exercises, staying active, and hydration.

## 2021-12-09 ENCOUNTER — HOSPITAL ENCOUNTER (OUTPATIENT)
Dept: NURSING | Age: 56
Discharge: HOME | End: 2021-12-09
Payer: COMMERCIAL

## 2021-12-09 VITALS
TEMPERATURE: 98.3 F | DIASTOLIC BLOOD PRESSURE: 66 MMHG | SYSTOLIC BLOOD PRESSURE: 146 MMHG | HEART RATE: 81 BPM | RESPIRATION RATE: 18 BRPM | OXYGEN SATURATION: 97 %

## 2021-12-09 DIAGNOSIS — U07.1 COVID-19: Primary | ICD-10-CM

## 2021-12-09 PROCEDURE — M0245 HC IV INFUSION BAMLANIVIMAB & ETESEVIMAB W/MONITORING: HCPCS

## 2021-12-09 PROCEDURE — 6360000002 HC RX W HCPCS: Performed by: FAMILY MEDICINE

## 2021-12-09 PROCEDURE — 2580000003 HC RX 258: Performed by: FAMILY MEDICINE

## 2021-12-09 PROCEDURE — 2500000003 HC RX 250 WO HCPCS: Performed by: FAMILY MEDICINE

## 2021-12-09 RX ORDER — DIPHENHYDRAMINE HYDROCHLORIDE 50 MG/ML
50 INJECTION INTRAMUSCULAR; INTRAVENOUS
Status: ACTIVE | OUTPATIENT
Start: 2021-12-09 | End: 2021-12-09

## 2021-12-09 RX ORDER — ALBUTEROL SULFATE 90 UG/1
4 AEROSOL, METERED RESPIRATORY (INHALATION) PRN
Status: CANCELLED | OUTPATIENT
Start: 2021-12-09

## 2021-12-09 RX ORDER — ACETAMINOPHEN 325 MG/1
650 TABLET ORAL
Status: ACTIVE | OUTPATIENT
Start: 2021-12-09 | End: 2021-12-09

## 2021-12-09 RX ORDER — SODIUM CHLORIDE 9 MG/ML
INJECTION, SOLUTION INTRAVENOUS CONTINUOUS
Status: CANCELLED | OUTPATIENT
Start: 2021-12-09

## 2021-12-09 RX ORDER — ONDANSETRON 2 MG/ML
8 INJECTION INTRAMUSCULAR; INTRAVENOUS
Status: ACTIVE | OUTPATIENT
Start: 2021-12-09 | End: 2021-12-09

## 2021-12-09 RX ORDER — DIPHENHYDRAMINE HYDROCHLORIDE 50 MG/ML
50 INJECTION INTRAMUSCULAR; INTRAVENOUS
Status: CANCELLED | OUTPATIENT
Start: 2021-12-09

## 2021-12-09 RX ORDER — SODIUM CHLORIDE 9 MG/ML
INJECTION, SOLUTION INTRAVENOUS CONTINUOUS
Status: DISCONTINUED | OUTPATIENT
Start: 2021-12-09 | End: 2021-12-10 | Stop reason: HOSPADM

## 2021-12-09 RX ORDER — ACETAMINOPHEN 325 MG/1
650 TABLET ORAL
Status: CANCELLED | OUTPATIENT
Start: 2021-12-09

## 2021-12-09 RX ORDER — ONDANSETRON 2 MG/ML
8 INJECTION INTRAMUSCULAR; INTRAVENOUS
Status: CANCELLED | OUTPATIENT
Start: 2021-12-09

## 2021-12-09 RX ORDER — HEPARIN SODIUM (PORCINE) LOCK FLUSH IV SOLN 100 UNIT/ML 100 UNIT/ML
500 SOLUTION INTRAVENOUS PRN
OUTPATIENT
Start: 2021-12-09

## 2021-12-09 RX ORDER — SODIUM CHLORIDE 0.9 % (FLUSH) 0.9 %
5-40 SYRINGE (ML) INJECTION PRN
OUTPATIENT
Start: 2021-12-09

## 2021-12-09 RX ORDER — ALBUTEROL SULFATE 90 UG/1
4 AEROSOL, METERED RESPIRATORY (INHALATION) PRN
Status: DISCONTINUED | OUTPATIENT
Start: 2021-12-09 | End: 2021-12-10 | Stop reason: HOSPADM

## 2021-12-09 RX ORDER — SODIUM CHLORIDE 9 MG/ML
25 INJECTION, SOLUTION INTRAVENOUS PRN
OUTPATIENT
Start: 2021-12-09

## 2021-12-09 RX ADMIN — SODIUM CHLORIDE: 9 INJECTION, SOLUTION INTRAVENOUS at 09:01

## 2021-12-09 ASSESSMENT — PAIN SCALES - GENERAL
PAINLEVEL_OUTOF10: 0
PAINLEVEL_OUTOF10: 0

## 2021-12-09 ASSESSMENT — PAIN - FUNCTIONAL ASSESSMENT: PAIN_FUNCTIONAL_ASSESSMENT: 0-10

## 2021-12-09 NOTE — PROGRESS NOTES
2837 Patient arrived to [facility] ambulatory for covid infusion. Oriented to room and call light  PT RIGHTS AND RESPONSIBILITIES OFFERED TO PT.  EUA given to patient    0901 Medication infusing and he denies complaints    0930 Medication completed and he denies complaints    1030 Patient denies complaints. Iv removed. Discharge instructions given and explained and he denies questions.   Discharged ambulatory       _M___ Safety:       (Environmental)  Kunal Sondra to environment   Ensure ID band is correct and in place/ allergy band as needed   Assess for fall risk   Initiate fall precautions as applicable (fall band, side rails, etc.)   Call light within reach   Bed in low position/ wheels locked    _M___ Pain:        Assess pain level and characteristics   Administer analgesics as ordered   Assess effectiveness of pain management and report to MD as needed    _M___ Knowledge Deficit:   Assess baseline knowledge   Provide teaching at level of understanding   Provide teaching via preferred learning method   Evaluate teaching effectiveness    __M__ Hemodynamic/Respiratory Status:       (Pre and Post Procedure Monitoring)   Assess/Monitor vital signs and LOC   Assess Baseline SpO2 prior to any sedation   Obtain weight/height   Assess vital signs/ LOC until patient meets discharge criteria   Monitor procedure site and notify MD of any issues

## 2021-12-15 ENCOUNTER — CARE COORDINATION (OUTPATIENT)
Dept: CARE COORDINATION | Age: 56
End: 2021-12-15

## 2021-12-15 NOTE — CARE COORDINATION
You Patient resolved from the Care Transitions episode on 12/15/21  Discussed COVID-19 related testing which was available at this time. Test results were positive. Patient informed of results, if available? Yes    Patient/family has been provided the following resources and education related to COVID-19:                         Signs, symptoms and red flags related to COVID-19            CDC exposure and quarantine guidelines            [employer] exposure contact - [phone]            Contact for their local Department of Health                 Patient currently reports that the following symptoms have improved:  fever and no new/worsening symptoms low grade fever highest 100, had PCP follow up. Advised to stay hydrated and symptom management. No other symptoms. No further outreach scheduled with this CTN/ACM. Episode of Care resolved. Patient has this CTN/ACM contact information if future needs arise.

## 2021-12-21 ENCOUNTER — TELEPHONE (OUTPATIENT)
Dept: FAMILY MEDICINE CLINIC | Age: 56
End: 2021-12-21

## 2021-12-21 NOTE — LETTER
[address] [address]  Phone: [phone]  Fax: 19 Jeevanandrew Yosvany Rodriguez,         December 21, 2021     Patient: Senia Dick   YOB: 1965   Date of Visit: N/A       To Whom It May Concern: It is my medical opinion that Leonard Arias may return to work on 12/22/21 with no restrictions. Please excuse 12/7 thru 12/21. If you have any questions or concerns, please don't hesitate to call.     Sincerely,        Heinz Denver, DO

## 2021-12-21 NOTE — TELEPHONE ENCOUNTER
Patient notified and now requesting we fax letter to #[phone] Attn: Dania Carson.   Letter faxed

## 2021-12-21 NOTE — TELEPHONE ENCOUNTER
Patient requesting letter to [facility] tomorrow without restrictions. Patient was off 12/7 through today due to Nba. Patient would like to  letter today.   Please advise

## 2022-02-11 ENCOUNTER — OFFICE VISIT (OUTPATIENT)
Dept: FAMILY MEDICINE CLINIC | Age: 57
End: 2022-02-11
Payer: COMMERCIAL

## 2022-02-11 ENCOUNTER — NURSE ONLY (OUTPATIENT)
Dept: LAB | Age: 57
End: 2022-02-11

## 2022-02-11 VITALS
BODY MASS INDEX: 42.66 KG/M2 | DIASTOLIC BLOOD PRESSURE: 74 MMHG | HEIGHT: 72 IN | WEIGHT: 315 LBS | RESPIRATION RATE: 16 BRPM | SYSTOLIC BLOOD PRESSURE: 136 MMHG | HEART RATE: 76 BPM

## 2022-02-11 DIAGNOSIS — Z00.00 WELL ADULT HEALTH CHECK: Primary | ICD-10-CM

## 2022-02-11 DIAGNOSIS — E78.00 PURE HYPERCHOLESTEROLEMIA: ICD-10-CM

## 2022-02-11 DIAGNOSIS — Z00.00 WELL ADULT HEALTH CHECK: ICD-10-CM

## 2022-02-11 DIAGNOSIS — G47.33 OBSTRUCTIVE SLEEP APNEA TREATED WITH BIPAP: ICD-10-CM

## 2022-02-11 DIAGNOSIS — K21.9 GASTROESOPHAGEAL REFLUX DISEASE, UNSPECIFIED WHETHER ESOPHAGITIS PRESENT: ICD-10-CM

## 2022-02-11 LAB
ALBUMIN SERPL-MCNC: 4.1 G/DL (ref 3.5–5.1)
ALP BLD-CCNC: 99 U/L (ref 38–126)
ALT SERPL-CCNC: 29 U/L (ref 11–66)
ANION GAP SERPL CALCULATED.3IONS-SCNC: 11 MEQ/L (ref 8–16)
AST SERPL-CCNC: 23 U/L (ref 5–40)
AVERAGE GLUCOSE: 135 MG/DL (ref 70–126)
BASOPHILS # BLD: 0.5 %
BASOPHILS ABSOLUTE: 0 THOU/MM3 (ref 0–0.1)
BILIRUB SERPL-MCNC: 0.4 MG/DL (ref 0.3–1.2)
BUN BLDV-MCNC: 10 MG/DL (ref 7–22)
CALCIUM SERPL-MCNC: 8.6 MG/DL (ref 8.5–10.5)
CHLORIDE BLD-SCNC: 108 MEQ/L (ref 98–111)
CHOLESTEROL, TOTAL: 109 MG/DL (ref 100–199)
CO2: 23 MEQ/L (ref 23–33)
CREAT SERPL-MCNC: 0.7 MG/DL (ref 0.4–1.2)
EOSINOPHIL # BLD: 2.7 %
EOSINOPHILS ABSOLUTE: 0.2 THOU/MM3 (ref 0–0.4)
ERYTHROCYTE [DISTWIDTH] IN BLOOD BY AUTOMATED COUNT: 16 % (ref 11.5–14.5)
ERYTHROCYTE [DISTWIDTH] IN BLOOD BY AUTOMATED COUNT: 50.4 FL (ref 35–45)
GFR SERPL CREATININE-BSD FRML MDRD: > 90 ML/MIN/1.73M2
GLUCOSE BLD-MCNC: 100 MG/DL (ref 70–108)
HBA1C MFR BLD: 6.5 % (ref 4.4–6.4)
HCT VFR BLD CALC: 40 % (ref 42–52)
HDLC SERPL-MCNC: 44 MG/DL
HEMOGLOBIN: 12.1 GM/DL (ref 14–18)
IMMATURE GRANS (ABS): 0.02 THOU/MM3 (ref 0–0.07)
IMMATURE GRANULOCYTES: 0.3 %
LDL CHOLESTEROL CALCULATED: 46 MG/DL
LYMPHOCYTES # BLD: 16.8 %
LYMPHOCYTES ABSOLUTE: 1.2 THOU/MM3 (ref 1–4.8)
MCH RBC QN AUTO: 26.2 PG (ref 26–33)
MCHC RBC AUTO-ENTMCNC: 30.3 GM/DL (ref 32.2–35.5)
MCV RBC AUTO: 86.8 FL (ref 80–94)
MONOCYTES # BLD: 9.9 %
MONOCYTES ABSOLUTE: 0.7 THOU/MM3 (ref 0.4–1.3)
NUCLEATED RED BLOOD CELLS: 0 /100 WBC
PLATELET # BLD: 356 THOU/MM3 (ref 130–400)
PMV BLD AUTO: 9.5 FL (ref 9.4–12.4)
POTASSIUM SERPL-SCNC: 4.3 MEQ/L (ref 3.5–5.2)
PROSTATE SPECIFIC ANTIGEN: 2.22 NG/ML (ref 0–1)
RBC # BLD: 4.61 MILL/MM3 (ref 4.7–6.1)
SEG NEUTROPHILS: 69.8 %
SEGMENTED NEUTROPHILS ABSOLUTE COUNT: 5.2 THOU/MM3 (ref 1.8–7.7)
SODIUM BLD-SCNC: 142 MEQ/L (ref 135–145)
TOTAL PROTEIN: 6.6 G/DL (ref 6.1–8)
TRIGL SERPL-MCNC: 94 MG/DL (ref 0–199)
TSH SERPL DL<=0.05 MIU/L-ACNC: 1.34 UIU/ML (ref 0.4–4.2)
WBC # BLD: 7.4 THOU/MM3 (ref 4.8–10.8)

## 2022-02-11 PROCEDURE — 99396 PREV VISIT EST AGE 40-64: CPT | Performed by: FAMILY MEDICINE

## 2022-02-11 SDOH — ECONOMIC STABILITY: FOOD INSECURITY: WITHIN THE PAST 12 MONTHS, THE FOOD YOU BOUGHT JUST DIDN'T LAST AND YOU DIDN'T HAVE MONEY TO GET MORE.: NEVER TRUE

## 2022-02-11 SDOH — ECONOMIC STABILITY: FOOD INSECURITY: WITHIN THE PAST 12 MONTHS, YOU WORRIED THAT YOUR FOOD WOULD RUN OUT BEFORE YOU GOT MONEY TO BUY MORE.: NEVER TRUE

## 2022-02-11 ASSESSMENT — PATIENT HEALTH QUESTIONNAIRE - PHQ9
1. LITTLE INTEREST OR PLEASURE IN DOING THINGS: 0
2. FEELING DOWN, DEPRESSED OR HOPELESS: 0
SUM OF ALL RESPONSES TO PHQ QUESTIONS 1-9: 0
SUM OF ALL RESPONSES TO PHQ9 QUESTIONS 1 & 2: 0
SUM OF ALL RESPONSES TO PHQ QUESTIONS 1-9: 0

## 2022-02-11 ASSESSMENT — SOCIAL DETERMINANTS OF HEALTH (SDOH): HOW HARD IS IT FOR YOU TO PAY FOR THE VERY BASICS LIKE FOOD, HOUSING, MEDICAL CARE, AND HEATING?: NOT HARD AT ALL

## 2022-02-11 ASSESSMENT — ENCOUNTER SYMPTOMS
GASTROINTESTINAL NEGATIVE: 1
RESPIRATORY NEGATIVE: 1

## 2022-02-11 NOTE — LETTER
[address] [place] [id number]  Phone: [phone]  Fax: 97 Jeevanandrew Yosvany Rodriguez DO        February 11, 2022     Patient: Carl Gonzalez   YOB: 1965   Date of Visit: 2/11/2022       To Whom It May Concern: It is my medical opinion that Tatianna Sheppard may return to work on 2/11/22. If you have any questions or concerns, please don't hesitate to call.     Sincerely,        Mervat Manzano, DO

## 2022-02-11 NOTE — PROGRESS NOTES
Dayanara Leon (:  1965) is a 59 y.o. male,Established patient, here for evaluation of the following chief complaint(s): Annual Exam        Subjective   SUBJECTIVE/OBJECTIVE:  HPI:    Chief Complaint   Patient presents with    Annual Exam     Annual eval.  Doing well overall. BP ok today. BP Readings from Last 3 Encounters:   22 136/74   21 (!) 146/66   21 (!) 150/69     Weight continues to climb, admittedly not eating great and does not exercise. Wt Readings from Last 3 Encounters:   22 (!) 344 lb 3.2 oz (156.1 kg)   21 (!) 333 lb (151 kg)   21 (!) 330 lb (149.7 kg)     Hx of CAD, follows yearly with Dr. Vick Short. No acute concerns for me today. Patient Active Problem List   Diagnosis    Asthma    Gastroesophageal reflux disease    Hyperlipidemia    History of smoking    Chronic back pain    S/P angioplasty    Groin hematoma    Testosterone deficiency    Joint pain    Arthritis    Atypical chest pain    Family history of premature CAD    Hypogonadism, male    Thyroid nodule    History of erectile dysfunction    Male sexual dysfunction    Heart disease    Chest pain with moderate risk of acute coronary syndrome    Coronary atherosclerosis    S/P PTCA: 2015: FFR-guided stenting of distal and mid-LAD using Xience Alpine 2.5X15, post-2.77, and Alpine 2.75X18 mm, post-3.18 mm.  Obesity (BMI 30-39. 9)    Obstructive sleep apnea treated with BiPAP    COVID-19     Past Surgical History:   Procedure Laterality Date    APPENDECTOMY      BACK SURGERY      X 2 FOR HERNIATED DISCS    CARDIOVASCULAR STRESS TEST  3 2010    Mild chest discomfort induced by IV Lexiscan that resolved spontaneously. No arrhythmias. No EKG changes to suggest ischemia.      CARPAL TUNNEL RELEASE      COLONOSCOPY      CORONARY ANGIOPLASTY      X 2 STENTS    DIAGNOSTIC CARDIAC CATH LAB PROCEDURE  2 2010    Successful drug-eluting stent x 2 of mid LAD. LV borderline normal LV function. EF 50-55%. No wall motion abnormalities detected and no MR. Normal hemodynamics. Coronaries - diffuse left anterior descending (LAD) disease with proximal 50% and distal around 70%. [facility]    right    KNEE SURGERY      left    LYMPH NODE BIOPSY      neck    SHOULDER ARTHROSCOPY     [facility]  ,     UPPER GASTROINTESTINAL ENDOSCOPY      US THYROID CYST ASPIRATION AND OR INJECTION  2021    US THYROID CYST ASPIRATION AND OR INJECTION 2021 [facility] ULTRASOUND     Social History     Tobacco Use    Smoking status: Former Smoker     Packs/day: 1.00     Years: 14.00     Pack years: 14.00     Types: Cigarettes     Quit date: 8/3/2002     Years since quittin.5    Smokeless tobacco: Current User     Types: Chew    Tobacco comment: Patient states, \"he chews tobacco.\"    Vaping Use    Vaping Use: Never used   Substance Use Topics    Alcohol use: Yes     Alcohol/week: 0.0 standard drinks     Comment: Patient states, \"seldom. \"    Drug use: No     Prior to Admission medications    Medication Sig Start Date End Date Taking?  Authorizing Provider   nitroGLYCERIN (NITROSTAT) 0.4 MG SL tablet Place 1 tablet under the tongue every 5 minutes as needed for Chest pain 21  Yes [address], MD   atorvastatin (LIPITOR) 40 MG tablet TAKE 1 TABLET EVERY OTHER DAY 21  Yes [address], MD   naproxen (NAPROSYN) 500 MG tablet Take 1 tablet by mouth 2 times daily (with meals) 6/15/21  Yes Mervat Manzano DO   pantoprazole (PROTONIX) 40 MG tablet TAKE 1 TABLET TWICE A DAY 21  Yes [address], MD   metoprolol succinate (TOPROL XL) 25 MG extended release tablet TAKE 1 TABLET TWICE A DAY 21  Yes [address], MD   cetirizine (ZYRTEC) 10 MG tablet Take 1 tablet by mouth daily 21  Yes Mervat Manzano DO   fluticasone [facility]) 50 MCG/ACT nasal spray 2 sprays by Each Nostril route daily 5/27/21  Yes Linsey Bishop DO   albuterol sulfate  (90 Base) MCG/ACT inhaler Inhale 2 puffs into the lungs every 4 hours as needed for Wheezing or Shortness of Breath 12/30/20  Yes Alexis Ponce APRN - CNP   Multiple Vitamins-Minerals (THERAPEUTIC MULTIVITAMIN-MINERALS) tablet Take 1 tablet by mouth daily   Yes Historical Provider, MD   aspirin 81 MG tablet Take 81 mg by mouth daily. Yes Historical Provider, MD   Cyanocobalamin (VITAMIN B 12 PO) Take 1,000 mg by mouth. Yes Historical Provider, MD         Review of Systems   Constitutional: Negative. HENT: Negative. Respiratory: Negative. Cardiovascular: Negative. Gastrointestinal: Negative. Musculoskeletal: Negative. All other systems reviewed and are negative. Objective   Physical Exam  Vitals and nursing note reviewed. Constitutional:       General: He is not in acute distress. Appearance: Normal appearance. He is well-developed. HENT:      Head: Normocephalic and atraumatic. Right Ear: Tympanic membrane normal.      Left Ear: Tympanic membrane normal.   Eyes:      Conjunctiva/sclera: Conjunctivae normal.   Cardiovascular:      Rate and Rhythm: Normal rate and regular rhythm. Heart sounds: Normal heart sounds. No murmur heard. Pulmonary:      Effort: Pulmonary effort is normal.      Breath sounds: Normal breath sounds. No wheezing, rhonchi or rales. Abdominal:      General: There is no distension. Musculoskeletal:      Cervical back: Neck supple. Skin:     General: Skin is warm and dry. Findings: No rash (on exposed surfaces). Neurological:      General: No focal deficit present. Mental Status: He is alert. Psychiatric:         Attention and Perception: Attention normal.         Mood and Affect: Mood normal.         Speech: Speech normal.         Behavior: Behavior normal. Behavior is cooperative. Thought Content:  Thought content normal.         Judgment: Judgment normal.               ASSESSMENT/PLAN:  1. Well adult health check  -     Lipid Panel w/ Reflex Direct LDL; Future  -     Comprehensive Metabolic Panel; Future  -     Hemoglobin A1C; Future  -     TSH with Reflex; Future  -     CBC Auto Differential; Future  -     PSA screening; Future  2. Obstructive sleep apnea treated with BiPAP  3. Gastroesophageal reflux disease, unspecified whether esophagitis present  4. Pure hypercholesterolemia    -  Healthy lifestyle discussed  -  Chronic medical problems stable  -  Continue current medications  -  Follow up with specialists as scheduled  -  Check labs, will call    Return in about 1 year (around 2/11/2023) for Wellness. An electronic signature was used to authenticate this note.     --Mary Marin, DO

## 2022-02-11 NOTE — PATIENT INSTRUCTIONS
You may receive a survey about your visit with us today. The feedback from our patients helps us identify what is working well and where the service to all patients can be enhanced. Thank you! Tobacco Cessation Programs     Telephonic behavior modification  Angy Latasha ([phone])   Counseling service for those who are ready to quit using tobacco.     Available for uninsured [place] residents, [place] recipients, pregnant women, or patients whose health plans or employers are members of the [address] [place] [address] behavior modification   http://[place]. Quitlogix. org   Online support program to help patients through each step of the quitting process. Available 24 hours a day 7 days a week. Provides up to date researched based tool, step-by-step guides, and motivational messages. Online behavior modification   www.lungusa.org/stop-smoking/how-to-quit   HelpLine: 1-800-LUNG-[place] (351-9865)   Email questions to:  Derrick@Quitbit. [employer]    Website offers resources to help tobacco users figure out their reasons for quitting and then take the big step of quitting for good. Hypnosis   Location: [facility], Kambit DAVIDA [employer], [place]   Contact: Lara Haines, PhD at [phone]   Hypnosis for tobacco cessation   Cost $225 for the initial session and $175 for each session afterwards. Most patients require 6-8 sessions. There is the option to submit through the patients insurance. Hypnosis and behavior modification   Location: [place] [address],  [address] [facility] [employer] [address], [place]   Contact: Melissa Kaminski, PhD at [phone]  [facility] and hypnosis for nicotine addition   Cost: For uninsured patients:  Please call above phone number  Cost for insured patients depends on patients insurance plan.     Behavior modification   Location: [facility], [address] [facility] [address] [place] [address]UERA [address], [address]: [place] [address] include four one-on-one appointments between the patient and a respiratory therapist.  The four appointments span over three weeks. The respiratory therapist schedules one of the appointments to occur 48 hours after the patients quit date.  Cost $100 total for the four sessions.   Tobacco cessation products are not included in the cost and are not provided by [facility].

## 2022-02-15 ENCOUNTER — VIRTUAL VISIT (OUTPATIENT)
Dept: FAMILY MEDICINE CLINIC | Age: 57
End: 2022-02-15
Payer: COMMERCIAL

## 2022-02-15 DIAGNOSIS — E11.9 NEW ONSET TYPE 2 DIABETES MELLITUS (HCC): Primary | ICD-10-CM

## 2022-02-15 PROCEDURE — 99442 PR PHYS/QHP TELEPHONE EVALUATION 11-20 MIN: CPT | Performed by: FAMILY MEDICINE

## 2022-02-15 RX ORDER — ORAL SEMAGLUTIDE 3 MG/1
TABLET ORAL
Qty: 30 TABLET | Refills: 0
Start: 2022-02-15 | End: 2022-03-15

## 2022-02-15 ASSESSMENT — ENCOUNTER SYMPTOMS
RESPIRATORY NEGATIVE: 1
GASTROINTESTINAL NEGATIVE: 1

## 2022-02-15 NOTE — PROGRESS NOTES
Nimisha Kelly (:  1965) is a Established patient, here for evaluation of the following:  Nimisha Kelly is a 59 y.o. male evaluated via telephone on 2/15/2022. Consent:  He and/or health care decision maker is aware that that he may receive a bill for this telephone service, which includes applicable co-pays, depending on his insurance coverage, and has provided verbal consent to proceed. Documentation:  I communicated with the patient and/or health care decision maker about new onset DM. Details of this discussion including any medical advice provided: see A/P      I affirm this is a Patient Initiated Episode with a Patient who has not had a related appointment within my department in the past 7 days or scheduled within the next 24 hours. Patient identification was verified at the start of the visit: Yes    Total Time: minutes: 11-20 minutes    Nimisha Kelly was evaluated through a synchronous (real-time) audio encounter. The patient was located at home in a state where the provider was licensed to provide care. Note: not billable if this call serves to triage the patient into an appointment for the relevant concern      DO Jorge Mi   HPI:    Chief Complaint   Patient presents with    Abnormal Lab     Pt presents at my request to review labs.     Lab Results   Component Value Date    LABA1C 6.5 (H) 2022    LABA1C 6.4 2021     Lab Results   Component Value Date    LDLCALC 46 2022    CREATININE 0.7 2022     Patient Active Problem List   Diagnosis    Asthma    Gastroesophageal reflux disease    Hyperlipidemia    History of smoking    Chronic back pain    S/P angioplasty    Groin hematoma    Testosterone deficiency    Joint pain    Arthritis    Atypical chest pain    Family history of premature CAD    Hypogonadism, male    Thyroid nodule    History of erectile dysfunction    Male sexual dysfunction    Heart disease    Chest pain with moderate risk of acute coronary syndrome    Coronary atherosclerosis    S/P PTCA: 2015: FFR-guided stenting of distal and mid-LAD using Xience Alpine 2.5X15, post-2.77, and Alpine 2.75X18 mm, post-3.18 mm.  Obesity (BMI 30-39. 9)    Obstructive sleep apnea treated with BiPAP    COVID-19     Past Surgical History:   Procedure Laterality Date    APPENDECTOMY      BACK SURGERY      X 2 FOR HERNIATED DISCS    CARDIOVASCULAR STRESS TEST  3 26 2010    Mild chest discomfort induced by IV Lexiscan that resolved spontaneously. No arrhythmias. No EKG changes to suggest ischemia.  CARPAL TUNNEL RELEASE      COLONOSCOPY      CORONARY ANGIOPLASTY      X 2 STENTS    DIAGNOSTIC CARDIAC CATH LAB PROCEDURE  2010    Successful drug-eluting stent x 2 of mid LAD. LV borderline normal LV function. EF 50-55%. No wall motion abnormalities detected and no MR. Normal hemodynamics. Coronaries - diffuse left anterior descending (LAD) disease with proximal 50% and distal around 70%. [facility]    right    KNEE SURGERY      left    LYMPH NODE BIOPSY      neck    SHOULDER ARTHROSCOPY     [facility] SURGERY  ,     UPPER GASTROINTESTINAL ENDOSCOPY      US THYROID CYST ASPIRATION AND OR INJECTION  2021    US THYROID CYST ASPIRATION AND OR INJECTION 2021 [facility] ULTRASOUND     Social History     Tobacco Use    Smoking status: Former Smoker     Packs/day: 1.00     Years: 14.00     Pack years: 14.00     Types: Cigarettes     Quit date: 8/3/2002     Years since quittin.5    Smokeless tobacco: Current User     Types: Chew    Tobacco comment: Patient states, \"he chews tobacco.\"    Vaping Use    Vaping Use: Never used   Substance Use Topics    Alcohol use: Yes     Alcohol/week: 0.0 standard drinks     Comment: Patient states, \"seldom. \"    Drug use: No       Review of Systems   Constitutional: Negative. HENT: Negative.     Respiratory: Negative. Cardiovascular: Negative. Gastrointestinal: Negative. Musculoskeletal: Negative. All other systems reviewed and are negative. Objective   Patient-Reported Vitals  No data recorded     Physical Exam  Constitutional:       General: He is not in acute distress. Pulmonary:      Effort: Pulmonary effort is normal. No respiratory distress. Psychiatric:         Mood and Affect: Mood normal.         Behavior: Behavior normal.         Thought Content: Thought content normal.         Judgment: Judgment normal.     Assessment & Plan   Below is the assessment and plan developed based on review of pertinent history, physical exam, labs, studies, and medications. 1. New onset type 2 diabetes mellitus ([facility] [address]  -     Hemoglobin A1C; Future    -  Labs reviewed  -  Start Rybelsus 3 mg for 1 month then plan to increase to 7 mg at that time  -  Recheck A1C 6 mos    Return in about 6 months (around 8/15/2022) for DM2. On this date 2/15/2022 I have spent 11-20 minutes reviewing previous notes, test results and face to face (virtual) with the patient discussing the diagnosis and importance of compliance with the treatment plan as well as documenting on the day of the visit. Douglas Yuan, was evaluated through a synchronous (real-time) audio-video encounter. The patient (or guardian if applicable) is aware that this is a billable service, which includes applicable co-pays. This Virtual Visit was conducted with patient's (and/or legal guardian's) consent. The visit was conducted pursuant to the emergency declaration under the [address] [place] [address], [address] [place] [address] authority and the [employer] General Act. Patient identification was verified, and a caregiver was present when appropriate. The patient was located at home in a state where the provider was licensed to provide care.        --Tricia Buitrago DO

## 2022-03-15 ENCOUNTER — TELEPHONE (OUTPATIENT)
Dept: FAMILY MEDICINE CLINIC | Age: 57
End: 2022-03-15

## 2022-03-15 RX ORDER — ORAL SEMAGLUTIDE 7 MG/1
TABLET ORAL
Qty: 90 TABLET | Refills: 3 | Status: SHIPPED | OUTPATIENT
Start: 2022-03-15

## 2022-03-28 ENCOUNTER — OFFICE VISIT (OUTPATIENT)
Dept: FAMILY MEDICINE CLINIC | Age: 57
End: 2022-03-28
Payer: COMMERCIAL

## 2022-03-28 VITALS
SYSTOLIC BLOOD PRESSURE: 128 MMHG | RESPIRATION RATE: 20 BRPM | HEART RATE: 104 BPM | BODY MASS INDEX: 46.6 KG/M2 | WEIGHT: 315 LBS | DIASTOLIC BLOOD PRESSURE: 72 MMHG

## 2022-03-28 DIAGNOSIS — H60.391 ACUTE INFECTIVE OTITIS EXTERNA, RIGHT: Primary | ICD-10-CM

## 2022-03-28 PROCEDURE — 99213 OFFICE O/P EST LOW 20 MIN: CPT | Performed by: FAMILY MEDICINE

## 2022-03-28 RX ORDER — CIPROFLOXACIN AND DEXAMETHASONE 3; 1 MG/ML; MG/ML
4 SUSPENSION/ DROPS AURICULAR (OTIC) 2 TIMES DAILY
Qty: 7.5 ML | Refills: 0 | Status: SHIPPED | OUTPATIENT
Start: 2022-03-28 | End: 2022-04-04

## 2022-03-28 ASSESSMENT — ENCOUNTER SYMPTOMS
GASTROINTESTINAL NEGATIVE: 1
RESPIRATORY NEGATIVE: 1

## 2022-03-28 NOTE — LETTER
[address] [address]  Phone: [phone]  Fax: [phone] Jeevanandrew Yosvany Rodriguez DO        March 28, 2022     Patient: Mikhail Warren   YOB: 1965   Date of Visit: 3/28/2022       To Whom It May Concern: It is my medical opinion that Kt Rios may return to work on 3/29/22 with no restrictions. If you have any questions or concerns, please don't hesitate to call.     Sincerely,        Sylvia Ma DO

## 2022-03-28 NOTE — PATIENT INSTRUCTIONS
You may receive a survey regarding the care you received during your visit. Your input is valuable to us. We encourage you to complete and return your survey. We hope you will choose us in the future for your healthcare needs. Patient Education        Swimmer's Ear: Care Instructions  Your Care Instructions     Swimmer's ear (otitis externa) is inflammation or infection of the ear canal. This is the passage that leads from the outer ear to the eardrum. Any water, sand, or other debris that gets into the ear canal and stays there can cause swimmer's ear. Putting cotton swabs or other items in the ear to clean it can also cause this problem. Swimmer's ear can be very painful. But you can treat the pain and infection with medicines. You should feel better in a few days. Follow-up care is a key part of your treatment and safety. Be sure to make and go to all appointments, and call your doctor if you are having problems. It's also a good idea to know your test results and keep a list of the medicines you take. How can you care for yourself at home? Cleaning and care  · Use antibiotic drops as your doctor directs. · Do not insert ear drops (other than the antibiotic ear drops) or anything else into the ear unless your doctor has told you to. · Avoid getting water in the ear until the problem clears up. Use cotton lightly coated with petroleum jelly as an earplug. Do not use plastic earplugs. · Use a hair dryer set on low to carefully dry the ear after you shower. · To ease ear pain, hold a warm washcloth against your ear. · Take pain medicines exactly as directed. ? If the doctor gave you a prescription medicine for pain, take it as prescribed. ? If you are not taking a prescription pain medicine, ask your doctor if you can take an over-the-counter medicine. Inserting ear drops  · Warm the drops to body temperature by rolling the container in your hands.  Or you can place it in a cup of warm water for a few minutes. · Lie down, with your ear facing up. · Place drops inside the ear. Follow your doctor's instructions (or the directions on the label) for how many drops to use. Gently wiggle the outer ear or pull the ear up and back to help the drops get into the ear. · It's important to keep the liquid in the ear canal for 3 to 5 minutes. When should you call for help? Call your doctor now or seek immediate medical care if:    · You have a new or higher fever.     · You have new or worse pain, swelling, warmth, or redness around or behind your ear.     · You have new or increasing pus or blood draining from your ear. Watch closely for changes in your health, and be sure to contact your doctor if:    · You are not getting better after 2 days (48 hours). Where can you learn more? Go to https://Inc[employer]ations. org and sign in to your Ruxter account. Enter C706 in the TipCity box to learn more about \"Swimmer's Ear: Care Instructions. \"     If you do not have an account, please click on the \"Sign Up Now\" link. Current as of: September 8, 2021               Content Version: 13.1  © 2006-2021 Healthwise, Incorporated. Care instructions adapted under license by [facility] ([facility]). If you have questions about a medical condition or this instruction, always ask your healthcare professional. [place] [address] any warranty or liability for your use of this information.

## 2022-03-28 NOTE — PROGRESS NOTES
Aliya Hemphill (:  1965) is a 59 y.o. male,Established patient, here for evaluation of the following chief complaint(s):  Otalgia (right ear pain the past week, got worse over the weekend)        Subjective   SUBJECTIVE/OBJECTIVE:  HPI:    Chief Complaint   Patient presents with    Otalgia     right ear pain the past week, got worse over the weekend     Pt here for right ear pain for the last week. Hurts to touch. Some mild drainage. OTC pain relievers help temporarily. Patient Active Problem List   Diagnosis    Asthma    Gastroesophageal reflux disease    Hyperlipidemia    History of smoking    Chronic back pain    S/P angioplasty    Groin hematoma    Testosterone deficiency    Joint pain    Arthritis    Atypical chest pain    Family history of premature CAD    Hypogonadism, male    Thyroid nodule    History of erectile dysfunction    Male sexual dysfunction    Heart disease    Chest pain with moderate risk of acute coronary syndrome    Coronary atherosclerosis    S/P PTCA: 2015: FFR-guided stenting of distal and mid-LAD using Xience Alpine 2.5X15, post-2.77, and Alpine 2.75X18 mm, post-3.18 mm.  Obesity (BMI 30-39. 9)    Obstructive sleep apnea treated with BiPAP    COVID-19     Past Surgical History:   Procedure Laterality Date    APPENDECTOMY      BACK SURGERY      X 2 FOR HERNIATED DISCS    CARDIOVASCULAR STRESS TEST  3 2010    Mild chest discomfort induced by IV Lexiscan that resolved spontaneously. No arrhythmias. No EKG changes to suggest ischemia.  CARPAL TUNNEL RELEASE      COLONOSCOPY      CORONARY ANGIOPLASTY      X 2 STENTS    DIAGNOSTIC CARDIAC CATH LAB PROCEDURE  2010    Successful drug-eluting stent x 2 of mid LAD. LV borderline normal LV function. EF 50-55%. No wall motion abnormalities detected and no MR. Normal hemodynamics. Coronaries - diffuse left anterior descending (LAD) disease with proximal 50% and distal around 70%. [facility]    right    KNEE SURGERY  1980    left    LYMPH NODE BIOPSY  2011    neck    SHOULDER ARTHROSCOPY     [facility] SURGERY  ,     UPPER GASTROINTESTINAL ENDOSCOPY      US THYROID CYST ASPIRATION AND OR INJECTION  2021     THYROID CYST ASPIRATION AND OR INJECTION 2021 [facility] ULTRASOUND     Social History     Tobacco Use    Smoking status: Former Smoker     Packs/day: 1.00     Years: 14.00     Pack years: 14.00     Types: Cigarettes     Quit date: 8/3/2002     Years since quittin.6    Smokeless tobacco: Current User     Types: Chew    Tobacco comment: Patient states, \"he chews tobacco.\"    Vaping Use    Vaping Use: Never used   Substance Use Topics    Alcohol use: Yes     Alcohol/week: 0.0 standard drinks     Comment: Patient states, \"seldom. \"    Drug use: No         Review of Systems   Constitutional: Negative. HENT: Positive for ear discharge and ear pain. Respiratory: Negative. Cardiovascular: Negative. Gastrointestinal: Negative. Musculoskeletal: Negative. All other systems reviewed and are negative. Objective   Physical Exam  Vitals and nursing note reviewed. Constitutional:       General: He is not in acute distress. Appearance: Normal appearance. He is well-developed. HENT:      Head: Normocephalic and atraumatic. Right Ear: Tympanic membrane normal. Drainage, swelling and tenderness present. Left Ear: Tympanic membrane normal.   Eyes:      Conjunctiva/sclera: Conjunctivae normal.   Cardiovascular:      Rate and Rhythm: Normal rate and regular rhythm. Heart sounds: Normal heart sounds. No murmur heard. Pulmonary:      Effort: Pulmonary effort is normal.      Breath sounds: Normal breath sounds. No wheezing, rhonchi or rales. Abdominal:      General: There is no distension. Musculoskeletal:      Cervical back: Neck supple. Skin:     General: Skin is warm and dry.       Findings: No rash (on exposed surfaces). Neurological:      General: No focal deficit present. Mental Status: He is alert. Psychiatric:         Attention and Perception: Attention normal.         Mood and Affect: Mood normal.         Speech: Speech normal.         Behavior: Behavior normal. Behavior is cooperative. Thought Content: Thought content normal.         Judgment: Judgment normal.               ASSESSMENT/PLAN:  1. Acute infective otitis externa, right  -     ciprofloxacin-dexamethasone (CIPRODEX) 0.3-0.1 % otic suspension; Place 4 drops into the right ear 2 times daily for 7 days, Disp-7.5 mL, R-0Normal    -  OTC Motrin prn    Return if symptoms worsen or fail to improve. An electronic signature was used to authenticate this note.     --Ting Roblero, DO

## 2022-06-27 ENCOUNTER — TELEMEDICINE (OUTPATIENT)
Dept: FAMILY MEDICINE CLINIC | Age: 57
End: 2022-06-27
Payer: COMMERCIAL

## 2022-06-27 DIAGNOSIS — J40 BRONCHITIS: Primary | ICD-10-CM

## 2022-06-27 PROCEDURE — 99442 PR PHYS/QHP TELEPHONE EVALUATION 11-20 MIN: CPT | Performed by: FAMILY MEDICINE

## 2022-06-27 RX ORDER — PREDNISONE 20 MG/1
20 TABLET ORAL 2 TIMES DAILY
Qty: 10 TABLET | Refills: 0 | Status: SHIPPED | OUTPATIENT
Start: 2022-06-27 | End: 2022-07-02

## 2022-06-27 RX ORDER — GUAIFENESIN AND CODEINE PHOSPHATE 100; 10 MG/5ML; MG/5ML
5 SOLUTION ORAL 4 TIMES DAILY PRN
Qty: 100 ML | Refills: 0 | Status: SHIPPED | OUTPATIENT
Start: 2022-06-27 | End: 2022-07-02

## 2022-06-27 RX ORDER — AZITHROMYCIN 250 MG/1
250 TABLET, FILM COATED ORAL SEE ADMIN INSTRUCTIONS
Qty: 6 TABLET | Refills: 0 | Status: SHIPPED | OUTPATIENT
Start: 2022-06-27 | End: 2022-07-05 | Stop reason: SDUPTHER

## 2022-06-27 ASSESSMENT — ENCOUNTER SYMPTOMS
GASTROINTESTINAL NEGATIVE: 1
CHEST TIGHTNESS: 1
RHINORRHEA: 0
COUGH: 1
SINUS PRESSURE: 0
SINUS PAIN: 0
WHEEZING: 0

## 2022-06-27 NOTE — PROGRESS NOTES
Edwinna Epley (:  1965) is a Established patient, here for evaluation of the following:  Edwinna Epley is a 59 y.o. male evaluated via telephone on 2022 for Cough  . Documentation:  I communicated with the patient and/or health care decision maker about cough. Details of this discussion including any medical advice provided: see A/P    Total Time: minutes: 11-20 minutes    Edwinna Epley was evaluated through a synchronous (real-time) audio encounter. Patient identification was verified at the start of the visit. He (or guardian if applicable) is aware that this is a billable service, which includes applicable co-pays. This visit was conducted with the patient's (and/or legal guardian's) verbal consent. He has not had a related appointment within my department in the past 7 days or scheduled within the next 24 hours. The patient was located at Home: [address] [place] [id number]. The provider was located at [facility] (Appt Dept): [address] [facility],  [address] [facility]. Note: not billable if this call serves to triage the patient into an appointment for the relevant concern    DO Jorge Arteaga   HPI:    Chief Complaint   Patient presents with    Cough     Pt presents with co cough, chest tightness and congestion for the last 2 weeks. No fevers. Denies URI symptoms. Hard to catch breath at times. No CP. No sick contacts.     Patient Active Problem List   Diagnosis    Asthma    Gastroesophageal reflux disease    Hyperlipidemia    History of smoking    Chronic back pain    S/P angioplasty    Groin hematoma    Testosterone deficiency    Joint pain    Arthritis    Atypical chest pain    Family history of premature CAD    Hypogonadism, male    Thyroid nodule    History of erectile dysfunction    Male sexual dysfunction    Heart disease    Chest pain with moderate risk of acute coronary syndrome    Coronary atherosclerosis    S/P PTCA: 1/12/2015: FFR-guided stenting of distal and mid-LAD using Xience Alpine 2.5X15, post-2.77, and Alpine 2.75X18 mm, post-3.18 mm.  Obesity (BMI 30-39. 9)    Obstructive sleep apnea treated with BiPAP    COVID-19     Past Surgical History:   Procedure Laterality Date    APPENDECTOMY      BACK SURGERY      X 2 FOR HERNIATED DISCS    CARDIOVASCULAR STRESS TEST  3 26 2010    Mild chest discomfort induced by IV Lexiscan that resolved spontaneously. No arrhythmias. No EKG changes to suggest ischemia.  CARPAL TUNNEL RELEASE  1980    COLONOSCOPY  2013    CORONARY ANGIOPLASTY      X 2 STENTS    DIAGNOSTIC CARDIAC CATH LAB PROCEDURE  2 22 2010    Successful drug-eluting stent x 2 of mid LAD. LV borderline normal LV function. EF 50-55%. No wall motion abnormalities detected and no MR. Normal hemodynamics. Coronaries - diffuse left anterior descending (LAD) disease with proximal 50% and distal around 70%. [address] [facility]    right    KNEE SURGERY  1980    left    LYMPH NODE BIOPSY  2011    neck    SHOULDER ARTHROSCOPY     [facility] SURGERY  1993, 2000    UPPER GASTROINTESTINAL ENDOSCOPY  2013    US THYROID CYST ASPIRATION AND OR INJECTION  2/23/2021    [facility]       Review of Systems   Constitutional: Negative. Negative for fever. HENT: Negative. Negative for congestion, rhinorrhea, sinus pressure and sinus pain. Respiratory: Positive for cough and chest tightness. Negative for wheezing. Cardiovascular: Negative. Gastrointestinal: Negative. Musculoskeletal: Negative. All other systems reviewed and are negative. Objective   Patient-Reported Vitals  No data recorded     Physical Exam  Constitutional:       General: He is not in acute distress. Pulmonary:      Effort: No respiratory distress. Neurological:      Mental Status: He is alert. Mental status is at baseline.    Psychiatric: Mood and Affect: Mood normal.         Behavior: Behavior normal.         Thought Content: Thought content normal.         Judgment: Judgment normal.     Assessment & Plan   Below is the assessment and plan developed based on review of pertinent history, physical exam, labs, studies, and medications. 1. Bronchitis  -     azithromycin (ZITHROMAX) 250 MG tablet; Take 1 tablet by mouth See Admin Instructions for 5 days 500mg on day 1 followed by 250mg on days 2 - 5, Disp-6 tablet, R-0Normal  -     predniSONE (DELTASONE) 20 MG tablet; Take 1 tablet by mouth 2 times daily for 5 days, Disp-10 tablet, R-0Normal  -     guaiFENesin-codeine (TUSSI-ORGANIDIN NR) 100-10 MG/5ML syrup; Take 5 mLs by mouth 4 times daily as needed for Cough for up to 5 days. , Disp-100 mL, R-0Normal    Return if symptoms worsen or fail to improve. On this date 6/27/2022 I have spent 11-20 minutes reviewing previous notes, test results and face to face (virtual) with the patient discussing the diagnosis and importance of compliance with the treatment plan as well as documenting on the day of the visit.            --Peyton Connor, DO

## 2022-07-01 ENCOUNTER — OFFICE VISIT (OUTPATIENT)
Dept: CARDIOLOGY CLINIC | Age: 57
End: 2022-07-01
Payer: COMMERCIAL

## 2022-07-01 VITALS
HEIGHT: 72 IN | WEIGHT: 315 LBS | DIASTOLIC BLOOD PRESSURE: 77 MMHG | SYSTOLIC BLOOD PRESSURE: 143 MMHG | BODY MASS INDEX: 42.66 KG/M2 | HEART RATE: 68 BPM

## 2022-07-01 DIAGNOSIS — I10 PRIMARY HYPERTENSION: ICD-10-CM

## 2022-07-01 DIAGNOSIS — R06.02 SOB (SHORTNESS OF BREATH): Primary | ICD-10-CM

## 2022-07-01 DIAGNOSIS — I25.10 CORONARY ARTERY DISEASE INVOLVING NATIVE CORONARY ARTERY OF NATIVE HEART WITHOUT ANGINA PECTORIS: ICD-10-CM

## 2022-07-01 PROCEDURE — 99213 OFFICE O/P EST LOW 20 MIN: CPT | Performed by: NUCLEAR MEDICINE

## 2022-07-01 PROCEDURE — 93000 ELECTROCARDIOGRAM COMPLETE: CPT | Performed by: NUCLEAR MEDICINE

## 2022-07-01 RX ORDER — METOPROLOL SUCCINATE 25 MG/1
TABLET, EXTENDED RELEASE ORAL
Qty: 180 TABLET | Refills: 3 | Status: SHIPPED | OUTPATIENT
Start: 2022-07-01

## 2022-07-01 RX ORDER — NITROGLYCERIN 0.4 MG/1
0.4 TABLET SUBLINGUAL EVERY 5 MIN PRN
Qty: 25 TABLET | Refills: 3 | Status: SHIPPED | OUTPATIENT
Start: 2022-07-01

## 2022-07-01 RX ORDER — ATORVASTATIN CALCIUM 40 MG/1
TABLET, FILM COATED ORAL
Qty: 90 TABLET | Refills: 3 | Status: SHIPPED | OUTPATIENT
Start: 2022-07-01

## 2022-07-01 NOTE — PROGRESS NOTES
[address] [facility] [address] [place] [address]  Dept: [phone]  Dept Fax: [phone]  Loc: [phone]    Visit Date: 7/1/2022    Santo Costa is a 59 y.o. male who presents todayfor:  Chief Complaint   Patient presents with    1 Year Follow Up    Check-Up    Hypertension    Coronary Artery Disease    Hyperlipidemia   know CAD and LAD stent   No chest pain   no changes in breathing  He is obese  Bp is stable  No dizziness  No syncope        HPI:  HPI  Past Medical History:   Diagnosis Date    Anxiety attacks     Anxiety due to family issues.  Arthritis     Asthma     as child    Atypical chest pain     Cuevas esophagus     Chronic back pain     Coronary artery disease     S/P stent of the LAD by Dr. Miguel Sow on 2 23 2010.  Erectile dysfunction     Family history of premature CAD     Gastroesophageal reflux disease     Groin hematoma     Herniated disc 2011    back and neck    History of erectile dysfunction 2008    History of giardia infection     History of Giardia.  History of iron deficiency     History of pulmonary embolus (PE) 1980's. Remote history of pulmonary embolus and negative computed tomography for pulmonary embolus.  History of smoking     Hyperlipidemia     Well managed.  Hypogonadism, male     The patient has central hypogonadism.  Joint pain     Morbid obesity     KIRAN (obstructive sleep apnea)     S/P angioplasty     S/P PTCA: 1/12/2015: FFR-guided stenting of distal and mid-LAD using Xience Alpine 2.5X15, post-2.77, and Alpine 2.75X18 mm, post-3.18 mm.  1/12/2015 1/12/2015: FFR-guided stenting of distal and mid-LAD using Xience Alpine 2.5 X 15 mm, post-dilated to 2.77 mm, and Xience Alpine 2.75 X 18 mm, post-dilated to 3.18 mm.   Dr. Jany Blount   2/22/2010: Stenting of distal LAD using Xience 2.5 X 15 overlapping distally with Xience 2.5 X 12 mm, Dr. Shawn Raymundo Use Topics    Alcohol use: Yes     Alcohol/week: 0.0 standard drinks     Comment: Patient states, \"seldom. \"      Current Outpatient Medications   Medication Sig Dispense Refill    azithromycin (ZITHROMAX) 250 MG tablet Take 1 tablet by mouth See Admin Instructions for 5 days 500mg on day 1 followed by 250mg on days 2 - 5 6 tablet 0    predniSONE (DELTASONE) 20 MG tablet Take 1 tablet by mouth 2 times daily for 5 days 10 tablet 0    guaiFENesin-codeine (TUSSI-ORGANIDIN NR) 100-10 MG/5ML syrup Take 5 mLs by mouth 4 times daily as needed for Cough for up to 5 days. 100 mL 0    Semaglutide (RYBELSUS) 7 MG TABS Take 1 tablet daily 90 tablet 3    nitroGLYCERIN (NITROSTAT) 0.4 MG SL tablet Place 1 tablet under the tongue every 5 minutes as needed for Chest pain 25 tablet 3    atorvastatin (LIPITOR) 40 MG tablet TAKE 1 TABLET EVERY OTHER DAY 90 tablet 3    naproxen (NAPROSYN) 500 MG tablet Take 1 tablet by mouth 2 times daily (with meals) 60 tablet 0    pantoprazole (PROTONIX) 40 MG tablet TAKE 1 TABLET TWICE A  tablet 3    metoprolol succinate (TOPROL XL) 25 MG extended release tablet TAKE 1 TABLET TWICE A  tablet 3    cetirizine (ZYRTEC) 10 MG tablet Take 1 tablet by mouth daily 30 tablet 5    fluticasone (FLONASE) 50 MCG/ACT nasal spray 2 sprays by Each Nostril route daily 1 Bottle 5    albuterol sulfate  (90 Base) MCG/ACT inhaler Inhale 2 puffs into the lungs every 4 hours as needed for Wheezing or Shortness of Breath 1 Inhaler 0    Multiple Vitamins-Minerals (THERAPEUTIC MULTIVITAMIN-MINERALS) tablet Take 1 tablet by mouth daily      aspirin 81 MG tablet Take 81 mg by mouth daily.  Cyanocobalamin (VITAMIN B 12 PO) Take 1,000 mg by mouth. No current facility-administered medications for this visit.      No Known Allergies  Health Maintenance   Topic Date Due    Pneumococcal 0-64 years Vaccine (1 - PCV) [birth year]/1969    HIV screen  Never done    Hepatitis C screen  Never done    DTaP/Tdap/Td vaccine (1 - Tdap) Never done    Shingles vaccine (1 of 2) Never done    COVID-19 Vaccine (2 - Pfizer 3-dose series) 12/10/2021    A1C test (Diabetic or Prediabetic)  05/11/2022    Flu vaccine (1) 09/01/2022    Lipids  02/11/2023    Depression Screen  02/11/2023    Prostate Specific Antigen (PSA) Screening or Monitoring  02/11/2023    Colorectal Cancer Screen  04/05/2031    Hepatitis A vaccine  Aged Out    Hepatitis B vaccine  Aged Out    Hib vaccine  Aged Out    Meningococcal (ACWY) vaccine  Aged Out       Subjective:  Review of Systems  General:   No fever, no chills, No fatigue or weight loss  Pulmonary:    No dyspnea, no wheezing  Cardiac:    Denies recent chest pain,   GI:     No nausea or vomiting, no abdominal pain  Neuro:    No dizziness or light headedness,   Musculoskeletal:  No recent active issues  Extremities:   No edema, no obvious claudication       Objective:  Physical Exam  BP (!) 143/77   Pulse 68   Ht 6' (1.829 m)   Wt (!) 324 lb 12.8 oz (147.3 kg)   BMI 44.05 kg/m²   General:   Well developed, well nourished  Lungs:   Clear to auscultation  Heart:    Normal S1 S2, Slight murmur. no rubs, no gallops  Abdomen:   Soft, non tender, no organomegalies, positive bowel sounds  Extremities:   No edema, no cyanosis, good peripheral pulses  Neurological:   Awake, alert, oriented. No obvious focal deficits  Musculoskelatal:  No obvious deformities    Assessment:      Diagnosis Orders   1. SOB (shortness of breath)  EKG 12 Lead   2. Coronary artery disease involving native coronary artery of native heart without angina pectoris  EKG 12 Lead   3. Primary hypertension     as above  Cardiac fair for now   ECG in office was done today. I reviewed the ECG. No acute findings    Plan:  No follow-ups on file. As above. Continue risk factor modification and medical management  Thank you for allowing me to participate in the care of your patient.  Please don't hesitate to contact me regarding any further issues related to the patient care    Orders Placed:  Orders Placed This Encounter   Procedures    EKG 12 Lead     Order Specific Question:   Reason for Exam?     Answer: Other       Medications Prescribed:  No orders of the defined types were placed in this encounter. Discussed use, benefit, and side effects of prescribed medications. All patient questions answered. Pt voicedunderstanding. Instructed to continue current medications, diet and exercise. Continue risk factor modification and medical management. Patient agreed with treatment plan. Follow up as directed.     Electronically signedby Sveta Salinas MD on 7/1/2022 at 8:54 AM

## 2022-07-05 ENCOUNTER — TELEPHONE (OUTPATIENT)
Dept: FAMILY MEDICINE CLINIC | Age: 57
End: 2022-07-05

## 2022-07-05 DIAGNOSIS — J40 BRONCHITIS: ICD-10-CM

## 2022-07-05 RX ORDER — AZITHROMYCIN 250 MG/1
250 TABLET, FILM COATED ORAL SEE ADMIN INSTRUCTIONS
Qty: 6 TABLET | Refills: 0 | Status: SHIPPED | OUTPATIENT
Start: 2022-07-05 | End: 2022-07-10

## 2022-07-05 NOTE — TELEPHONE ENCOUNTER
The patient called in and stated that he cont with a cough. States that it is better since he finished the Zpak and the predinsone. States that he is still coughing up a white sputum and is wondering if he needs a new script to help him get over this. The patient uses [facility] in [place], Please advise.       If no call back the patient will check with his pharmacy after 4pm

## 2022-07-11 ENCOUNTER — OFFICE VISIT (OUTPATIENT)
Dept: PULMONOLOGY | Age: 57
End: 2022-07-11
Payer: COMMERCIAL

## 2022-07-11 VITALS
HEIGHT: 72 IN | TEMPERATURE: 98.1 F | WEIGHT: 315 LBS | HEART RATE: 71 BPM | BODY MASS INDEX: 42.66 KG/M2 | OXYGEN SATURATION: 99 % | SYSTOLIC BLOOD PRESSURE: 130 MMHG | DIASTOLIC BLOOD PRESSURE: 80 MMHG

## 2022-07-11 DIAGNOSIS — G47.33 OBSTRUCTIVE SLEEP APNEA TREATED WITH BIPAP: Primary | ICD-10-CM

## 2022-07-11 DIAGNOSIS — E66.01 MORBIDLY OBESE (HCC): ICD-10-CM

## 2022-07-11 PROCEDURE — 99214 OFFICE O/P EST MOD 30 MIN: CPT | Performed by: NURSE PRACTITIONER

## 2022-07-11 ASSESSMENT — ENCOUNTER SYMPTOMS
EYES NEGATIVE: 1
NAUSEA: 0
CHEST TIGHTNESS: 0
STRIDOR: 0
WHEEZING: 0
VOMITING: 0
GASTROINTESTINAL NEGATIVE: 1
DIARRHEA: 0
ALLERGIC/IMMUNOLOGIC NEGATIVE: 1
RESPIRATORY NEGATIVE: 1

## 2022-07-11 NOTE — PROGRESS NOTES
[facility]      Patricia Cruz         [id number]  7/11/2022   Chief Complaint   Patient presents with    Follow-up     1 year KIRAN with [facility]ch[facility]        Former [place] Pt. PAP Download:   Original or initial AHI: 16.8     Date of initial study: 7/13/12      Compliant  100%     Noncompliant 0 %     PAP Type Spont    Level  15/11   Avg Hrs/Day 7hr 52min  AHI: 0.4   Recorded compliance dates : 6/8/22-7/7/22   Machine/Mfg:   [x] ResMed    [] Respironics/Dreamstation   Interface:   [] Nasal    [] Nasal pillows   [x] FFM      Provider:      [x] [facility]     []Diann     [] Abdirahman    [] Donato Sosa    [] Schw[place]ans               [] [facility]      [] Adaptive    [] [address]      [] Other    Neck Size: 17  Mallampati 4  ESS:  4  SAQLI: 91    Here is a scan of the most recent download:              Presentation:   Laura Gasca presents for sleep medicine follow up for obstructive sleep apnea  Since the last visit, Laura Gasca has been compliant with his BiPAP therapy and continues to see benefit from its use  Awake and alert today in the office     Equipment issues: The pressure is  acceptable, the mask is acceptable     Sleep issues:  Do you feel better? Yes  More rested? Yes   Better concentration? yes    Progress History:   Since last visit any new medical issues? Pre diabetic -   New ER or hospital visits? No  Any new or changes in medicines? Started on Rybelsus   Any new sleep medicines? No    Review of Systems -   Review of Systems   Constitutional: Negative. Negative for chills, fever and unexpected weight change. HENT: Negative. Eyes: Negative. Respiratory: Negative. Negative for chest tightness, wheezing and stridor. Cardiovascular: Negative for chest pain and leg swelling. Gastrointestinal: Negative. Negative for diarrhea, nausea and vomiting. Endocrine: Negative. Genitourinary: Negative. Negative for dysuria. Musculoskeletal: Negative. Skin: Negative. Allergic/Immunologic: Negative. Neurological: Negative. Hematological: Negative. Psychiatric/Behavioral: Negative. Physical Exam:    BMI:  Body mass index is 44.48 kg/m². Wt Readings from Last 3 Encounters:   07/11/22 (!) 328 lb (148.8 kg)   07/01/22 (!) 324 lb 12.8 oz (147.3 kg)   03/28/22 (!) 343 lb 9.6 oz (155.9 kg)     Weight lost 15 lbs over 4 months   Vitals: /80 (Site: Left Upper Arm, Position: Sitting, Cuff Size: Medium Adult)   Pulse 71   Temp 98.1 °F (36.7 °C) (Oral)   Ht 6' (1.829 m)   Wt (!) 328 lb (148.8 kg)   SpO2 99%   BMI 44.48 kg/m²       Physical Exam  Vitals and nursing note reviewed. Constitutional:       General: He is not in acute distress. Appearance: He is morbidly obese. HENT:      Head: Normocephalic and atraumatic. Neck:      Trachea: No tracheal deviation. Cardiovascular:      Rate and Rhythm: Normal rate and regular rhythm. Heart sounds: Normal heart sounds. No murmur heard. Pulmonary:      Effort: Pulmonary effort is normal. No respiratory distress. Breath sounds: Normal breath sounds. No stridor. No wheezing or rales. Chest:      Chest wall: No tenderness. Abdominal:      General: Bowel sounds are normal. There is no distension. Palpations: Abdomen is soft. Skin:     General: Skin is warm and dry. Capillary Refill: Capillary refill takes less than 2 seconds. Neurological:      Mental Status: He is alert and oriented to person, place, and time. Psychiatric:         Behavior: Behavior normal.         Thought Content: Thought content normal.         Judgment: Judgment normal.           ASSESSMENT/DIAGNOSIS     Diagnosis Orders   1. Obstructive sleep apnea treated with BiPAP  DME Order for CPAP as OP   2. Morbidly obese ([address]              Plan   Do you need any equipment today?  Yes     - Download reviewed and discussed with patient  - He  was advised to continue current positive airway pressure therapy with above described pressure. - He  advised to keep good compliance with current recommended pressure to get optimal results and clinical improvement  - Recommend 7-9 hours of sleep with PAP  - He was advised to call [employer] company regarding supplies if needed.   -He call my office for earlier appointment if needed for worsening of sleep symptoms.   - He was instructed on weight loss  - Patient to discuss weight loss options with PCP  - Kishor Mckeon was educated about my impression and plan. Patient verbalizesunderstanding.   We will see Jed Jones back in: 1 year with download    Information added by my medical assistant/LPN was reviewed today    Electronically signed by KANIKA Gibbons CNP on 7/11/2022 at 2:21 PM

## 2022-07-11 NOTE — PROGRESS NOTES
PAP pressure is okay. PAP mask is okay. Patient is falling asleep without difficulty. Patient is having difficulty remaining asleep. Patient needs supplies. Concerns/Complaints: none.

## 2022-08-01 ENCOUNTER — TELEPHONE (OUTPATIENT)
Dept: CARDIOLOGY CLINIC | Age: 57
End: 2022-08-01

## 2022-08-01 NOTE — TELEPHONE ENCOUNTER
Pt was here 7/1, he states his atorvastatin was always every other day, new script sent for every day, asking which you want him to take? No change was mentioned in your note.      Sent to dr Jame Jackman when he returns

## 2022-08-11 RX ORDER — PANTOPRAZOLE SODIUM 40 MG/1
TABLET, DELAYED RELEASE ORAL
Qty: 180 TABLET | Refills: 3 | Status: SHIPPED | OUTPATIENT
Start: 2022-08-11

## 2022-08-30 ENCOUNTER — NURSE ONLY (OUTPATIENT)
Dept: LAB | Age: 57
End: 2022-08-30

## 2022-08-30 DIAGNOSIS — E11.9 NEW ONSET TYPE 2 DIABETES MELLITUS (HCC): ICD-10-CM

## 2022-08-30 LAB
AVERAGE GLUCOSE: 123 MG/DL (ref 70–126)
HBA1C MFR BLD: 6.1 % (ref 4.4–6.4)

## 2022-09-13 ENCOUNTER — HOSPITAL ENCOUNTER (OUTPATIENT)
Age: 57
Discharge: HOME | End: 2022-09-13
Payer: COMMERCIAL

## 2022-09-13 ENCOUNTER — OFFICE VISIT (OUTPATIENT)
Dept: FAMILY MEDICINE CLINIC | Age: 57
End: 2022-09-13
Payer: COMMERCIAL

## 2022-09-13 ENCOUNTER — HOSPITAL ENCOUNTER (OUTPATIENT)
Dept: GENERAL RADIOLOGY | Age: 57
Discharge: HOME | End: 2022-09-13
Payer: COMMERCIAL

## 2022-09-13 ENCOUNTER — TELEPHONE (OUTPATIENT)
Dept: FAMILY MEDICINE CLINIC | Age: 57
End: 2022-09-13

## 2022-09-13 VITALS
SYSTOLIC BLOOD PRESSURE: 127 MMHG | RESPIRATION RATE: 20 BRPM | DIASTOLIC BLOOD PRESSURE: 68 MMHG | WEIGHT: 315 LBS | BODY MASS INDEX: 46 KG/M2 | HEART RATE: 68 BPM

## 2022-09-13 DIAGNOSIS — M79.671 ACUTE FOOT PAIN, RIGHT: ICD-10-CM

## 2022-09-13 DIAGNOSIS — M79.671 ACUTE FOOT PAIN, RIGHT: Primary | ICD-10-CM

## 2022-09-13 DIAGNOSIS — M77.41 METATARSALGIA OF RIGHT FOOT: ICD-10-CM

## 2022-09-13 PROCEDURE — 73630 X-RAY EXAM OF FOOT: CPT

## 2022-09-13 PROCEDURE — 99213 OFFICE O/P EST LOW 20 MIN: CPT | Performed by: FAMILY MEDICINE

## 2022-09-13 RX ORDER — PENICILLIN V POTASSIUM 500 MG/1
4 TABLET ORAL ONCE
COMMUNITY
Start: 2022-07-28

## 2022-09-13 ASSESSMENT — ENCOUNTER SYMPTOMS
GASTROINTESTINAL NEGATIVE: 1
RESPIRATORY NEGATIVE: 1

## 2022-09-13 NOTE — PROGRESS NOTES
Andi Peguero (:  1965) is a 59 y.o. male,Established patient, here for evaluation of the following chief complaint(s): Foot Pain (Numbness - started 2 weeks ago)        Subjective   SUBJECTIVE/OBJECTIVE:  HPI:    Chief Complaint   Patient presents with    Foot Pain     Numbness - started 2 weeks ago     Pt presents right foot pain for the last 2 weeks. Located just underneath the toes. Some numbness also noted, only QHS. Denies redness or swelling. NKI. Taking Aleve, Motrin, Tylenol with little relief. Patient Active Problem List   Diagnosis    Asthma    Gastroesophageal reflux disease    Hyperlipidemia    History of smoking    Chronic back pain    S/P angioplasty    Groin hematoma    Testosterone deficiency    Joint pain    Arthritis    Atypical chest pain    Family history of premature CAD    Hypogonadism, male    Thyroid nodule    History of erectile dysfunction    Male sexual dysfunction    Heart disease    Chest pain with moderate risk of acute coronary syndrome    Coronary atherosclerosis    S/P PTCA: 2015: FFR-guided stenting of distal and mid-LAD using Xience Alpine 2.5X15, post-2.77, and Alpine 2.75X18 mm, post-3.18 mm. Obesity (BMI 30-39. 9)    Obstructive sleep apnea treated with BiPAP    COVID-19     Past Surgical History:   Procedure Laterality Date    APPENDECTOMY      BACK SURGERY      X 2 FOR HERNIATED DISCS    CARDIOVASCULAR STRESS TEST  3 26 2010    Mild chest discomfort induced by IV Lexiscan that resolved spontaneously. No arrhythmias. No EKG changes to suggest ischemia. [address]    COLONOSCOPY  2013    CORONARY ANGIOPLASTY      X 2 STENTS    DIAGNOSTIC CARDIAC CATH LAB PROCEDURE  2010    Successful drug-eluting stent x 2 of mid LAD. LV borderline normal LV function. EF 50-55%. No wall motion abnormalities detected and no MR. Normal hemodynamics.  Coronaries - diffuse left anterior descending (LAD) disease with proximal 50% and distal around 70%. ELBOW SURGERY  1980    right    KNEE SURGERY  1980    left    LYMPH NODE BIOPSY  2011    neck    SHOULDER ARTHROSCOPY      SPINE SURGERY  ,     UPPER GASTROINTESTINAL ENDOSCOPY      US THYROID CYST ASPIRATION AND OR INJECTION  2021     THYROID CYST ASPIRATION AND OR INJECTION 2021 [facility] ULTRASOUND     Social History     Tobacco Use    Smoking status: Former     Packs/day: 1.00     Years: 14.00     Pack years: 14.00     Types: Cigarettes     Quit date: 8/3/2002     Years since quittin.1    Smokeless tobacco: Current     Types: Chew    Tobacco comments:     Patient states, \"he chews tobacco.\"    Vaping Use    Vaping Use: Never used   Substance Use Topics    Alcohol use: Yes     Alcohol/week: 0.0 standard drinks     Comment: Patient states, \"seldom. \"    Drug use: No         Review of Systems   Constitutional: Negative. HENT: Negative. Respiratory: Negative. Cardiovascular: Negative. Negative for leg swelling. Gastrointestinal: Negative. Musculoskeletal:  Positive for arthralgias (right foot pain). Negative for joint swelling. Neurological:  Positive for numbness (at bedtime). Negative for weakness. All other systems reviewed and are negative. Objective   Physical Exam  Vitals and nursing note reviewed. Constitutional:       General: He is not in acute distress. Appearance: Normal appearance. He is well-developed. HENT:      Head: Normocephalic and atraumatic. Right Ear: Tympanic membrane normal.      Left Ear: Tympanic membrane normal.   Eyes:      Conjunctiva/sclera: Conjunctivae normal.   Cardiovascular:      Rate and Rhythm: Normal rate and regular rhythm. Heart sounds: Normal heart sounds. No murmur heard. Pulmonary:      Effort: Pulmonary effort is normal.      Breath sounds: Normal breath sounds. No wheezing, rhonchi or rales. Abdominal:      General: There is no distension.    Musculoskeletal:      Cervical back: Neck supple. Right foot: Normal range of motion. Prominent metatarsal heads present. No deformity or bunion. Feet:      Right foot:      Protective Sensation: 5 sites tested. 5 sites sensed. Skin integrity: Callus present. No ulcer, skin breakdown, erythema or warmth. Skin:     General: Skin is warm and dry. Findings: No rash (on exposed surfaces). Neurological:      General: No focal deficit present. Mental Status: He is alert. Psychiatric:         Attention and Perception: Attention normal.         Mood and Affect: Mood normal.         Speech: Speech normal.         Behavior: Behavior normal. Behavior is cooperative. Thought Content: Thought content normal.         Judgment: Judgment normal.             ASSESSMENT/PLAN:  1. Acute foot pain, right  -     XR FOOT RIGHT (MIN 3 VIEWS); Future  2. Metatarsalgia of right foot    -  Check xray to rule out stress fx  -  Metatarsal pad    Return if symptoms worsen or fail to improve. An electronic signature was used to authenticate this note.     --Clarke Chin, DO

## 2022-09-13 NOTE — PATIENT INSTRUCTIONS
You may receive a survey regarding the care you received during your visit. Your input is valuable to us. We encourage you to complete and return your survey. We hope you will choose us in the future for your healthcare needs.     Metatarsal pad

## 2022-09-13 NOTE — TELEPHONE ENCOUNTER
Patient calling due to left [facility] and they need a Rx for metatarsal pads and inserts.   Please advise

## 2023-01-10 RX ORDER — ORAL SEMAGLUTIDE 7 MG/1
TABLET ORAL
Qty: 90 TABLET | Refills: 3 | Status: SHIPPED | OUTPATIENT
Start: 2023-01-10

## 2023-01-26 ENCOUNTER — HOSPITAL ENCOUNTER (INPATIENT)
Age: 58
LOS: 1 days | Discharge: HOME | DRG: 247 | End: 2023-01-29
Attending: EMERGENCY MEDICINE | Admitting: PHYSICIAN ASSISTANT
Payer: COMMERCIAL

## 2023-01-26 ENCOUNTER — APPOINTMENT (OUTPATIENT)
Dept: CT IMAGING | Age: 58
DRG: 247 | End: 2023-01-26
Payer: COMMERCIAL

## 2023-01-26 ENCOUNTER — APPOINTMENT (OUTPATIENT)
Dept: GENERAL RADIOLOGY | Age: 58
DRG: 247 | End: 2023-01-26
Payer: COMMERCIAL

## 2023-01-26 DIAGNOSIS — I20.0 UNSTABLE ANGINA (HCC): ICD-10-CM

## 2023-01-26 DIAGNOSIS — R07.9 CHEST PAIN, UNSPECIFIED TYPE: Primary | ICD-10-CM

## 2023-01-26 LAB
ANION GAP SERPL CALC-SCNC: 12 MEQ/L (ref 8–16)
BASOPHILS ABSOLUTE: 0.1 THOU/MM3 (ref 0–0.1)
BASOPHILS NFR BLD AUTO: 1 %
BUN SERPL-MCNC: 14 MG/DL (ref 7–22)
CALCIUM SERPL-MCNC: 7.9 MG/DL (ref 8.5–10.5)
CHLORIDE SERPL-SCNC: 109 MEQ/L (ref 98–111)
CO2 SERPL-SCNC: 21 MEQ/L (ref 23–33)
CREAT SERPL-MCNC: 0.6 MG/DL (ref 0.4–1.2)
DEPRECATED RDW RBC AUTO: 44.1 FL (ref 35–45)
EOSINOPHIL NFR BLD AUTO: 3.8 %
EOSINOPHILS ABSOLUTE: 0.2 THOU/MM3 (ref 0–0.4)
ERYTHROCYTE [DISTWIDTH] IN BLOOD BY AUTOMATED COUNT: 14.8 % (ref 11.5–14.5)
FLUAV RNA RESP QL NAA+PROBE: NOT DETECTED
FLUBV RNA RESP QL NAA+PROBE: NOT DETECTED
GFR SERPL CREATININE-BSD FRML MDRD: > 60 ML/MIN/1.73M2
GLUCOSE SERPL-MCNC: 136 MG/DL (ref 70–108)
HCT VFR BLD AUTO: 36.7 % (ref 42–52)
HGB BLD-MCNC: 11.5 GM/DL (ref 14–18)
IMM GRANULOCYTES # BLD AUTO: 0.01 THOU/MM3 (ref 0–0.07)
IMM GRANULOCYTES NFR BLD AUTO: 0.2 %
LYMPHOCYTES ABSOLUTE: 0.9 THOU/MM3 (ref 1–4.8)
LYMPHOCYTES NFR BLD AUTO: 16.4 %
MCH RBC QN AUTO: 25.7 PG (ref 26–33)
MCHC RBC AUTO-ENTMCNC: 31.3 GM/DL (ref 32.2–35.5)
MCV RBC AUTO: 82.1 FL (ref 80–94)
MONOCYTES ABSOLUTE: 0.6 THOU/MM3 (ref 0.4–1.3)
MONOCYTES NFR BLD AUTO: 12 %
NEUTROPHILS NFR BLD AUTO: 66.6 %
NRBC BLD AUTO-RTO: 0 /100 WBC
NT-PROBNP SERPL IA-MCNC: < 36 PG/ML (ref 0–124)
OSMOLALITY SERPL CALC.SUM OF ELEC: 285.7 MOSMOL/KG (ref 275–300)
PLATELET # BLD AUTO: 266 THOU/MM3 (ref 130–400)
PMV BLD AUTO: 9.3 FL (ref 9.4–12.4)
POTASSIUM SERPL-SCNC: 4.1 MEQ/L (ref 3.5–5.2)
RBC # BLD AUTO: 4.47 MILL/MM3 (ref 4.7–6.1)
SARS-COV-2 RNA RESP QL NAA+PROBE: NOT DETECTED
SEGMENTED NEUTROPHILS ABSOLUTE COUNT: 3.5 THOU/MM3 (ref 1.8–7.7)
SODIUM SERPL-SCNC: 142 MEQ/L (ref 135–145)
TROPONIN T: < 0.01 NG/ML
WBC # BLD AUTO: 5.2 THOU/MM3 (ref 4.8–10.8)

## 2023-01-26 PROCEDURE — 99285 EMERGENCY DEPT VISIT HI MDM: CPT

## 2023-01-26 PROCEDURE — 71275 CT ANGIOGRAPHY CHEST: CPT

## 2023-01-26 PROCEDURE — 99223 1ST HOSP IP/OBS HIGH 75: CPT | Performed by: PHYSICIAN ASSISTANT

## 2023-01-26 PROCEDURE — 6360000004 HC RX CONTRAST MEDICATION: Performed by: STUDENT IN AN ORGANIZED HEALTH CARE EDUCATION/TRAINING PROGRAM

## 2023-01-26 PROCEDURE — 36415 COLL VENOUS BLD VENIPUNCTURE: CPT

## 2023-01-26 PROCEDURE — 87636 SARSCOV2 & INF A&B AMP PRB: CPT

## 2023-01-26 PROCEDURE — G0378 HOSPITAL OBSERVATION PER HR: HCPCS

## 2023-01-26 PROCEDURE — 6370000000 HC RX 637 (ALT 250 FOR IP): Performed by: STUDENT IN AN ORGANIZED HEALTH CARE EDUCATION/TRAINING PROGRAM

## 2023-01-26 PROCEDURE — 96372 THER/PROPH/DIAG INJ SC/IM: CPT

## 2023-01-26 PROCEDURE — 93005 ELECTROCARDIOGRAM TRACING: CPT | Performed by: EMERGENCY MEDICINE

## 2023-01-26 PROCEDURE — 71045 X-RAY EXAM CHEST 1 VIEW: CPT

## 2023-01-26 PROCEDURE — 85025 COMPLETE CBC W/AUTO DIFF WBC: CPT

## 2023-01-26 PROCEDURE — 6360000002 HC RX W HCPCS: Performed by: PHYSICIAN ASSISTANT

## 2023-01-26 PROCEDURE — 84484 ASSAY OF TROPONIN QUANT: CPT

## 2023-01-26 PROCEDURE — 2580000003 HC RX 258: Performed by: PHYSICIAN ASSISTANT

## 2023-01-26 PROCEDURE — 83880 ASSAY OF NATRIURETIC PEPTIDE: CPT

## 2023-01-26 PROCEDURE — 6370000000 HC RX 637 (ALT 250 FOR IP): Performed by: PHYSICIAN ASSISTANT

## 2023-01-26 PROCEDURE — 80048 BASIC METABOLIC PNL TOTAL CA: CPT

## 2023-01-26 RX ORDER — METOPROLOL SUCCINATE 25 MG/1
25 TABLET, EXTENDED RELEASE ORAL 2 TIMES DAILY
Status: DISCONTINUED | OUTPATIENT
Start: 2023-01-26 | End: 2023-01-29 | Stop reason: HOSPADM

## 2023-01-26 RX ORDER — SODIUM CHLORIDE 0.9 % (FLUSH) 0.9 %
5-40 SYRINGE (ML) INJECTION EVERY 12 HOURS SCHEDULED
Status: DISCONTINUED | OUTPATIENT
Start: 2023-01-26 | End: 2023-01-29 | Stop reason: HOSPADM

## 2023-01-26 RX ORDER — ACETAMINOPHEN 500 MG
1000 TABLET ORAL ONCE
Status: COMPLETED | OUTPATIENT
Start: 2023-01-26 | End: 2023-01-26

## 2023-01-26 RX ORDER — CETIRIZINE HYDROCHLORIDE 10 MG/1
10 TABLET ORAL DAILY
Status: DISCONTINUED | OUTPATIENT
Start: 2023-01-26 | End: 2023-01-27

## 2023-01-26 RX ORDER — ONDANSETRON 2 MG/ML
4 INJECTION INTRAMUSCULAR; INTRAVENOUS EVERY 6 HOURS PRN
Status: DISCONTINUED | OUTPATIENT
Start: 2023-01-26 | End: 2023-01-29 | Stop reason: HOSPADM

## 2023-01-26 RX ORDER — POTASSIUM CHLORIDE 7.45 MG/ML
10 INJECTION INTRAVENOUS PRN
Status: DISCONTINUED | OUTPATIENT
Start: 2023-01-26 | End: 2023-01-29 | Stop reason: HOSPADM

## 2023-01-26 RX ORDER — FLUTICASONE PROPIONATE 50 MCG
2 SPRAY, SUSPENSION (ML) NASAL DAILY
Status: DISCONTINUED | OUTPATIENT
Start: 2023-01-26 | End: 2023-01-27

## 2023-01-26 RX ORDER — ACETAMINOPHEN 650 MG/1
650 SUPPOSITORY RECTAL EVERY 6 HOURS PRN
Status: DISCONTINUED | OUTPATIENT
Start: 2023-01-26 | End: 2023-01-28 | Stop reason: SDUPTHER

## 2023-01-26 RX ORDER — ONDANSETRON 4 MG/1
4 TABLET, ORALLY DISINTEGRATING ORAL EVERY 8 HOURS PRN
Status: DISCONTINUED | OUTPATIENT
Start: 2023-01-26 | End: 2023-01-29 | Stop reason: HOSPADM

## 2023-01-26 RX ORDER — SODIUM CHLORIDE 0.9 % (FLUSH) 0.9 %
5-40 SYRINGE (ML) INJECTION PRN
Status: DISCONTINUED | OUTPATIENT
Start: 2023-01-26 | End: 2023-01-29 | Stop reason: HOSPADM

## 2023-01-26 RX ORDER — POLYETHYLENE GLYCOL 3350 17 G/17G
17 POWDER, FOR SOLUTION ORAL DAILY PRN
Status: DISCONTINUED | OUTPATIENT
Start: 2023-01-26 | End: 2023-01-29 | Stop reason: HOSPADM

## 2023-01-26 RX ORDER — MAGNESIUM SULFATE IN WATER 40 MG/ML
2000 INJECTION, SOLUTION INTRAVENOUS PRN
Status: DISCONTINUED | OUTPATIENT
Start: 2023-01-26 | End: 2023-01-29 | Stop reason: HOSPADM

## 2023-01-26 RX ORDER — ALBUTEROL SULFATE 90 UG/1
2 AEROSOL, METERED RESPIRATORY (INHALATION) EVERY 4 HOURS PRN
Status: DISCONTINUED | OUTPATIENT
Start: 2023-01-26 | End: 2023-01-29 | Stop reason: HOSPADM

## 2023-01-26 RX ORDER — SODIUM CHLORIDE 9 MG/ML
INJECTION, SOLUTION INTRAVENOUS PRN
Status: DISCONTINUED | OUTPATIENT
Start: 2023-01-26 | End: 2023-01-29 | Stop reason: HOSPADM

## 2023-01-26 RX ORDER — NITROGLYCERIN 20 MG/100ML
5-200 INJECTION INTRAVENOUS CONTINUOUS
Status: DISCONTINUED | OUTPATIENT
Start: 2023-01-26 | End: 2023-01-26

## 2023-01-26 RX ORDER — PANTOPRAZOLE SODIUM 40 MG/1
40 TABLET, DELAYED RELEASE ORAL
Status: DISCONTINUED | OUTPATIENT
Start: 2023-01-26 | End: 2023-01-29 | Stop reason: HOSPADM

## 2023-01-26 RX ORDER — ENOXAPARIN SODIUM 100 MG/ML
40 INJECTION SUBCUTANEOUS 2 TIMES DAILY
Status: DISCONTINUED | OUTPATIENT
Start: 2023-01-26 | End: 2023-01-29 | Stop reason: HOSPADM

## 2023-01-26 RX ORDER — POTASSIUM CHLORIDE 20 MEQ/1
40 TABLET, EXTENDED RELEASE ORAL PRN
Status: DISCONTINUED | OUTPATIENT
Start: 2023-01-26 | End: 2023-01-29 | Stop reason: HOSPADM

## 2023-01-26 RX ORDER — ASPIRIN 81 MG/1
81 TABLET ORAL DAILY
Status: DISCONTINUED | OUTPATIENT
Start: 2023-01-26 | End: 2023-01-29 | Stop reason: HOSPADM

## 2023-01-26 RX ORDER — ATORVASTATIN CALCIUM 40 MG/1
40 TABLET, FILM COATED ORAL DAILY
Status: DISCONTINUED | OUTPATIENT
Start: 2023-01-26 | End: 2023-01-29 | Stop reason: HOSPADM

## 2023-01-26 RX ORDER — ACETAMINOPHEN 325 MG/1
650 TABLET ORAL EVERY 6 HOURS PRN
Status: DISCONTINUED | OUTPATIENT
Start: 2023-01-26 | End: 2023-01-28 | Stop reason: SDUPTHER

## 2023-01-26 RX ADMIN — SODIUM CHLORIDE, PRESERVATIVE FREE 10 ML: 5 INJECTION INTRAVENOUS at 14:16

## 2023-01-26 RX ADMIN — ENOXAPARIN SODIUM 40 MG: 100 INJECTION SUBCUTANEOUS at 14:16

## 2023-01-26 RX ADMIN — IOPAMIDOL 100 ML: 755 INJECTION, SOLUTION INTRAVENOUS at 09:19

## 2023-01-26 RX ADMIN — ENOXAPARIN SODIUM 40 MG: 100 INJECTION SUBCUTANEOUS at 20:40

## 2023-01-26 RX ADMIN — PANTOPRAZOLE SODIUM 40 MG: 40 TABLET, DELAYED RELEASE ORAL at 17:17

## 2023-01-26 RX ADMIN — ATORVASTATIN CALCIUM 40 MG: 40 TABLET, FILM COATED ORAL at 17:15

## 2023-01-26 RX ADMIN — SODIUM CHLORIDE, PRESERVATIVE FREE 10 ML: 5 INJECTION INTRAVENOUS at 20:41

## 2023-01-26 RX ADMIN — ASPIRIN 81 MG: 81 TABLET ORAL at 17:14

## 2023-01-26 RX ADMIN — ACETAMINOPHEN 1000 MG: 500 TABLET ORAL at 08:04

## 2023-01-26 ASSESSMENT — HEART SCORE: ECG: 1

## 2023-01-26 ASSESSMENT — PAIN - FUNCTIONAL ASSESSMENT
PAIN_FUNCTIONAL_ASSESSMENT: 0-10
PAIN_FUNCTIONAL_ASSESSMENT: NONE - DENIES PAIN

## 2023-01-26 ASSESSMENT — PAIN DESCRIPTION - LOCATION
LOCATION: CHEST
LOCATION: HEAD
LOCATION: HEAD

## 2023-01-26 ASSESSMENT — PAIN SCALES - GENERAL
PAINLEVEL_OUTOF10: 6
PAINLEVEL_OUTOF10: 2

## 2023-01-26 NOTE — H&P
Hospitalist History & Physical    Patient:  Grace Bishop    Unit/Bed:6K-26/026-A  YOB: 1965  MRN: [id number]   Acct: [de-identified]   PCP: Rodri Current, DO  Code Status: Full Code    Date of Service: Pt seen/examined on 01/26/23 and admitted to Observation with expected LOS less than two midnights due to medical therapy. Chief Complaint: chest pain    Assessment/Plan:    Chest pain, LEON: Heart score 5. Initial troponin negative. EKG- NSR, no ischemic changes. ASA and nitro given in ED. Trend troponin, repeat EKG. Cardiology contacted for consideration of [facility]. CAD s/p PCI: Last heart cath in 2015 with stenting of LAD. Follows with Dr. Hima Ortega. Resume ASA, statin, metoprolol. Last stress test 2018 was equivocal, possible inferior ischemia. Cardiology contacted. Essential HTN: BP stable, cont home metoprolol     Chronic normocytic anemia: Hgb stable, no signs of acute bleeding. KIRAN: home CPAP    Morbid obesity: BMI 45.98      History of Present Illness:  Grace Bishop is a 59 y.o. male with PMHx of anxiety, CAD, chronic back pain, STEVEN, PE, KIRAN, thyroid nodule who presented to [facility] with chief complaint of chest pain, SOB. Patient reports dyspnea on exertion ongoing for the last few months. He states he is not seen cardiology, as he was waiting until his appointment next month. Patient states that this is new for him, he is now unable to climb a flight of stairs without becoming short of breath. Reports mild lower extremity edema starting over the last month. He states this is worse after work and resolves with leg elevation. Patient states that he has always slept sitting up, so unclear if any orthopnea. Patient states that today, he was walking into work when he had sudden onset of chest pain that radiated to his back and left arm. Associated with shortness of breath. No lightheadedness, syncope, fever/chills, abdominal pain, N/V/D, urinary symptoms.       Review of Systems: Pertinent positives as noted in the HPI. All other systems reviewed and negative.    Past Medical History:        Diagnosis Date    Anxiety attacks     Anxiety due to family issues.    Arthritis     Asthma     as child    Atypical chest pain     Cuevas esophagus     Chronic back pain     Coronary artery disease     S/P stent of the LAD by Dr. Rick on 2 23 2010.    Erectile dysfunction     Family history of premature CAD     Gastroesophageal reflux disease     Groin hematoma     Herniated disc 2011    back and neck    History of erectile dysfunction 2008    History of giardia infection     History of Giardia.     History of iron deficiency     History of pulmonary embolus (PE) 1980's.    Remote history of pulmonary embolus and negative computed tomography for pulmonary embolus.     History of smoking     Hyperlipidemia     Well managed.    Hypogonadism, male     The patient has central hypogonadism.     Joint pain     Morbid obesity     KIRAN (obstructive sleep apnea)     S/P angioplasty     S/P PTCA: 1/12/2015: FFR-guided stenting of distal and mid-LAD using Xience Alpine 2.5X15, post-2.77, and Alpine 2.75X18 mm, post-3.18 mm.  1/12/2015 1/12/2015: FFR-guided stenting of distal and mid-LAD using Xience Alpine 2.5 X 15 mm, post-dilated to 2.77 mm, and Xience Alpine 2.75 X 18 mm, post-dilated to 3.18 mm.  Dr. Gordon   2/22/2010: Stenting of distal LAD using Xience 2.5 X 15 overlapping distally with Xience 2.5 X 12 mm, Dr. Rick    Testosterone deficiency     Thyroid nodule     The patient is euthyroid.        Past Surgical History:        Procedure Laterality Date    APPENDECTOMY      BACK SURGERY      X 2 FOR HERNIATED DISCS    CARDIOVASCULAR STRESS TEST  3 26 2010    Mild chest discomfort induced by IV Lexiscan that resolved spontaneously. No arrhythmias. No EKG changes to suggest ischemia.     CARPAL TUNNEL RELEASE  1980    COLONOSCOPY  2013    CORONARY ANGIOPLASTY      X 2 STENTS    DIAGNOSTIC  CARDIAC CATH LAB PROCEDURE  2 22 2010    Successful drug-eluting stent x 2 of mid LAD. LV borderline normal LV function. EF 50-55%. No wall motion abnormalities detected and no MR. Normal hemodynamics. Coronaries - diffuse left anterior descending (LAD) disease with proximal 50% and distal around 70%. ELBOW SURGERY  1980    right    KNEE SURGERY  1980    left    LYMPH NODE BIOPSY  2011    neck    SHOULDER ARTHROSCOPY      [address], 2000    UPPER GASTROINTESTINAL ENDOSCOPY  2013    US THYROID CYST ASPIRATION AND OR INJECTION  2/23/2021    US THYROID CYST ASPIRATION AND OR INJECTION 2/23/2021 [facility] ULTRASOUND       Home Medications:   No current facility-administered medications on file prior to encounter.      Current Outpatient Medications on File Prior to Encounter   Medication Sig Dispense Refill    Semaglutide (RYBELSUS) 7 MG TABS TAKE 1 TABLET BY MOUTH  DAILY 90 tablet 3    penicillin v potassium (VEETID) 500 MG tablet Take 4 tablets by mouth once (Patient not taking: Reported on 9/13/2022)      pantoprazole (PROTONIX) 40 MG tablet TAKE 1 TABLET BY MOUTH  TWICE DAILY 180 tablet 3    atorvastatin (LIPITOR) 40 MG tablet Take 1 tablet daily (Patient taking differently: Take 1 tablet every other day) 90 tablet 3    metoprolol succinate (TOPROL XL) 25 MG extended release tablet Take 1 tablet twice a day 180 tablet 3    nitroGLYCERIN (NITROSTAT) 0.4 MG SL tablet Place 1 tablet under the tongue every 5 minutes as needed for Chest pain 25 tablet 3    naproxen (NAPROSYN) 500 MG tablet Take 1 tablet by mouth 2 times daily (with meals) 60 tablet 0    cetirizine (ZYRTEC) 10 MG tablet Take 1 tablet by mouth daily 30 tablet 5    fluticasone (FLONASE) 50 MCG/ACT nasal spray 2 sprays by Each Nostril route daily 1 Bottle 5    albuterol sulfate  (90 Base) MCG/ACT inhaler Inhale 2 puffs into the lungs every 4 hours as needed for Wheezing or Shortness of Breath 1 Inhaler 0    Multiple Vitamins-Minerals (THERAPEUTIC MULTIVITAMIN-MINERALS) tablet Take 1 tablet by mouth daily      aspirin 81 MG tablet Take 81 mg by mouth daily. Cyanocobalamin (VITAMIN B 12 PO) Take 1,000 mg by mouth. Allergies:    Patient has no known allergies. Social History:    reports that he quit smoking about 20 years ago. His smoking use included cigarettes. He has a 14.00 pack-year smoking history. His smokeless tobacco use includes chew. He reports current alcohol use. He reports that he does not use drugs. Family History:       Problem Relation Age of Onset    Heart Disease Mother         Bypass/Surgery. Cancer Mother         Breast cancer. Coronary Art Dis Mother         History of CAD with myocardial infarction and open heart surgery in her 63's. COPD Mother     Heart Disease Father         Bypass/Surgery. Cancer Father         Leukemia. Coronary Art Dis Father         History of CAD with myocardial infarction and open heart surgery around his 52's. Prostate Cancer Neg Hx        Diet:  Diet NPO      Physical Exam:  BP (!) 156/66   Pulse 73   Temp 98.6 °F (37 °C) (Oral)   Resp 18   Ht 6' (1.829 m)   Wt (!) 339 lb (153.8 kg)   SpO2 99%   BMI 45.98 kg/m²   General:   Pleasant male. NAD. HEENT:  normocephalic and atraumatic. No scleral icterus. PERR. Neck: supple. No JVD. No thyromegaly. Lungs: clear to auscultation. No retractions  Cardiac: RRR without murmur. Abdomen: soft. Nontender. Bowel sounds positive. Extremities:  No clubbing, cyanosis, or edema x 4. Vasculature: capillary refill < 3 seconds. Palpable LE pulses bilaterally. Skin:  warm and dry. No rashes or lesions. Psych:  Alert and oriented x3. Affect appropriate  Lymph:  No supraclavicular adenopathy. Neurologic:  No focal deficit. No seizures.     Data: (All radiographs, tracings, PFTs, and imaging are personally viewed and interpreted unless otherwise noted)  Labs:   Recent Labs     01/26/23  0801   WBC 5.2 HGB 11.5*   HCT 36.7*        Recent Labs     01/26/23  0801      K 4.1      CO2 21*   BUN 14   CREATININE 0.6   CALCIUM 7.9*     No results for input(s): AST, ALT, BILIDIR, BILITOT, ALKPHOS in the last 72 hours. No results for input(s): INR in the last 72 hours. No results for input(s): Marvie Forget in the last 72 hours. Urinalysis:   Lab Results   Component Value Date/Time    BLOODU Negative 02/05/2013 04:22 PM       EKG: normal sinus rhythm, no blocks or conduction defects, no ischemic changes    Radiology:  CTA CHEST W WO CONTRAST   Final Result   1. The thoracic aorta is normal course and caliber. No aortic aneurysm or dissection is present. No acute intrathoracic process is observed. 2. Numerous chronic findings are discussed. **This report has been created using voice recognition software. It may contain minor errors which are inherent in voice recognition technology. **      Final report electronically signed by Dr Deshawn Roberts on 1/26/2023 9:43 AM      XR CHEST PORTABLE   Final Result   1. No acute cardiopulmonary finding. 2. Platelike subsegmental atelectasis in the right lower lung zone. **This report has been created using voice recognition software. It may contain minor errors which are inherent in voice recognition technology. **      Final report electronically signed by Dr Ruth Mcgovern on 1/26/2023 8:21 AM        CTA CHEST W WO CONTRAST    Result Date: 1/26/2023  PROCEDURE: CTA CHEST W WO CONTRAST CLINICAL INFORMATION: Chest pain. Arm pain. Back pain. COMPARISON: CTA chest 1/11/2015. TECHNIQUE: 3 mm axial images were obtained through the aorta after the administration of intravenous contrast.  Pre-contrast axial images were also obtained. 3D reconstructions were performed on the scanner to include sagittal and coronal MIP reconstructions through the aorta.  All CT scans at this facility use dose modulation, iterative reconstruction, and/or weight based dosing when appropriate to reduce the radiation dose to as low as reasonably achievable. CONTRAST: 80 cc Isovue-370. FINDINGS: Heart/mediastinum: The left lobe of the thyroid gland is enlarged and heterogeneous in attenuation. The heart size is normal. Coronary calcifications are observed. No pericardial effusion is identified. The aorta is not dilated. No aortic aneurysm or dissection is present. No mediastinal, hilar, or axillary lymphadenopathy is visualized. Although not tailored for the detection of pulmonary arterial filling defects, no filling defects are noted within the pulmonary arterial vasculature to suggest the presence of pulmonary embolism. Lungs: No focal consolidation, pleural effusion, or pneumothorax is visualized. Dependent atelectasis is noted at the lung bases. No pulmonary mass or nodule is observed. The central airways are patent and unremarkable bilaterally. Upper abdomen: A 12 mm left adrenal adenoma is present. The right adrenal gland is unremarkable. Hepatomegaly and hepatic steatosis are unchanged. A 25 mm simple cyst in the upper pole of the left kidney is incompletely visualized. No acute findings are noted in the limited images through the upper abdomen. A small sliding-type hiatal hernia is stable. Musculoskeletal: Multilevel degenerative disc disease is noted in the thoracic spine. The visualized skeletal structures appear intact. Postoperative changes in the left shoulder suggesting prior rotator cuff repair are partially visualized. 1. The thoracic aorta is normal course and caliber. No aortic aneurysm or dissection is present. No acute intrathoracic process is observed. 2. Numerous chronic findings are discussed. **This report has been created using voice recognition software. It may contain minor errors which are inherent in voice recognition technology. ** Final report electronically signed by Dr Bessy Sharma on 1/26/2023 9:43 AM    XR CHEST PORTABLE    Result Date: 1/26/2023  PROCEDURE: XR CHEST PORTABLE CLINICAL INFORMATION: chest pain, sob COMPARISON: 1/15/2021 TECHNIQUE: AP portable chest radiograph performed. FINDINGS: Platelike subsegmental atelectasis is seen in the right lower lung zone. No focal pulmonary consolidation. Cardiac silhouette is not enlarged. No pleural effusion. No pneumothorax. No acute bony abnormality. 1. No acute cardiopulmonary finding. 2. Platelike subsegmental atelectasis in the right lower lung zone. **This report has been created using voice recognition software. It may contain minor errors which are inherent in voice recognition technology. ** Final report electronically signed by Dr Shane Villa on 1/26/2023 8:21 AM        Tele:   [x] yes             [] no      Thank you Kaylee Givens DO for the opportunity to be involved in this patient's care.     Electronically signed by Luann Knight PA-C on 1/26/2023 at 11:28 AM

## 2023-01-26 NOTE — ED PROVIDER NOTES
[address] [facility] [address] [address] EMERGENCY DEPT      EMERGENCY MEDICINE     Pt Name: Luis Milan  MRN: [id number]  [place] 1965  Date of evaluation: 1/26/2023  Provider: Faviola Alvarado MD  Supervising Physician: Josie Sanchez       Chief Complaint   Patient presents with    Chest Pain     History obtained from the patient. HISTORY OF PRESENT ILLNESS   Luis Milan is a pleasant 59 y.o. male who presents to the emergency department from from home, brought in by EMS for evaluation of chest pain. Patient has history of prediabetes, hypertension, hyperlipidemia, CAD status post 4 stents, most recent cath in 2010, most recent stress test in June 2018, was positive stress test.  Patient states that over the past year, he has had shortness of breath with any exertion, chest pain with more significant exertion. Today was walking to work, had his normal shortness of breath, took a nitro, improved. As he was walking, had sudden onset mid back pain, headedness, diaphoresis, left arm pain and tingling. Took another nitro, pain improved, has not completely resolved. .  Currently 1 out of 10, midsternal, radiating to left arm, not associated with any fever, chills, fever, vomiting. Recent viral illnesses. Has appointment scheduled with Dr. Gwen Felix next month. denies any additional complaints. Pertinent ROS included above. PASTMEDICAL HISTORY     Past Medical History:   Diagnosis Date    Anxiety attacks     Anxiety due to family issues. Arthritis     Asthma     as child    Atypical chest pain     Cuevas esophagus     Chronic back pain     Coronary artery disease     S/P stent of the LAD by Dr. Diogenes Morton on 2 23 2010. Erectile dysfunction     Family history of premature CAD     Gastroesophageal reflux disease     Groin hematoma     Herniated disc 2011    back and neck    History of erectile dysfunction 2008    History of giardia infection     History of Giardia.      History of iron deficiency History of pulmonary embolus (PE) 1980's. Remote history of pulmonary embolus and negative computed tomography for pulmonary embolus. History of smoking     Hyperlipidemia     Well managed. Hypogonadism, male     The patient has central hypogonadism. Joint pain     Morbid obesity     KIRAN (obstructive sleep apnea)     S/P angioplasty     S/P PTCA: 1/12/2015: FFR-guided stenting of distal and mid-LAD using Xience Alpine 2.5X15, post-2.77, and Alpine 2.75X18 mm, post-3.18 mm.  1/12/2015 1/12/2015: FFR-guided stenting of distal and mid-LAD using Xience Alpine 2.5 X 15 mm, post-dilated to 2.77 mm, and Xience Alpine 2.75 X 18 mm, post-dilated to 3.18 mm. Dr. Guadalupe Perez   2/22/2010: Stenting of distal LAD using Xience 2.5 X 15 overlapping distally with Xience 2.5 X 12 mm, Dr. Wale Davidson    Testosterone deficiency     Thyroid nodule     The patient is euthyroid. Patient Active Problem List   Diagnosis Code    Asthma J45.909    Gastroesophageal reflux disease K21.9    Hyperlipidemia E78.5    History of smoking Z87.891    Chronic back pain M54.9, G89.29    S/P angioplasty Z98.62    Groin hematoma S30. 1XXA    Testosterone deficiency E34.9    Joint pain M25.50    Arthritis M19.90    Atypical chest pain R07.89    Family history of premature CAD Z82.49    Hypogonadism, male E29.1    Thyroid nodule E04.1    History of erectile dysfunction Z87.438    Male sexual dysfunction N53.9    Heart disease I51.9    Chest pain with moderate risk of acute coronary syndrome R07.9    Coronary atherosclerosis I25.10    S/P PTCA: 1/12/2015: FFR-guided stenting of distal and mid-LAD using Xience Alpine 2.5X15, post-2.77, and Alpine 2.75X18 mm, post-3.18 mm. Z98.61    Obesity (BMI 30-39. 9) E66.9    Obstructive sleep apnea treated with BiPAP G47.33    COVID-19 U07.1     SURGICAL HISTORY       Past Surgical History:   Procedure Laterality Date    APPENDECTOMY      BACK SURGERY      X 2 FOR HERNIATED DISCS    CARDIOVASCULAR STRESS TEST  3 26 2010    Mild chest discomfort induced by IV Lexiscan that resolved spontaneously. No arrhythmias. No EKG changes to suggest ischemia. [address]    COLONOSCOPY  2013    CORONARY ANGIOPLASTY      X 2 STENTS    DIAGNOSTIC CARDIAC CATH LAB PROCEDURE  2 22 2010    Successful drug-eluting stent x 2 of mid LAD. LV borderline normal LV function. EF 50-55%. No wall motion abnormalities detected and no MR. Normal hemodynamics. Coronaries - diffuse left anterior descending (LAD) disease with proximal 50% and distal around 70%. ELBOW SURGERY  1980    right    KNEE SURGERY  1980    left    LYMPH NODE BIOPSY  2011    neck    SHOULDER ARTHROSCOPY      SPINE SURGERY  1993, 2000    UPPER GASTROINTESTINAL ENDOSCOPY  2013    US THYROID CYST ASPIRATION AND OR INJECTION  2/23/2021    US THYROID CYST ASPIRATION AND OR INJECTION 2/23/2021 [facility] ULTRASOUND       CURRENT MEDICATIONS       Previous Medications    ALBUTEROL SULFATE  (90 BASE) MCG/ACT INHALER    Inhale 2 puffs into the lungs every 4 hours as needed for Wheezing or Shortness of Breath    ASPIRIN 81 MG TABLET    Take 81 mg by mouth daily. ATORVASTATIN (LIPITOR) 40 MG TABLET    Take 1 tablet daily    CETIRIZINE (ZYRTEC) 10 MG TABLET    Take 1 tablet by mouth daily    CYANOCOBALAMIN (VITAMIN B 12 PO)    Take 1,000 mg by mouth.       FLUTICASONE (FLONASE) 50 MCG/ACT NASAL SPRAY    2 sprays by Each Nostril route daily    METOPROLOL SUCCINATE (TOPROL XL) 25 MG EXTENDED RELEASE TABLET    Take 1 tablet twice a day    MULTIPLE VITAMINS-MINERALS (THERAPEUTIC MULTIVITAMIN-MINERALS) TABLET    Take 1 tablet by mouth daily    NAPROXEN (NAPROSYN) 500 MG TABLET    Take 1 tablet by mouth 2 times daily (with meals)    NITROGLYCERIN (NITROSTAT) 0.4 MG SL TABLET    Place 1 tablet under the tongue every 5 minutes as needed for Chest pain    PANTOPRAZOLE (PROTONIX) 40 MG TABLET    TAKE 1 TABLET BY MOUTH  TWICE DAILY    PENICILLIN V POTASSIUM (VEETID) 500 MG TABLET    Take 4 tablets by mouth once    SEMAGLUTIDE (RYBELSUS) 7 MG TABS    TAKE 1 TABLET BY MOUTH  DAILY       ALLERGIES     has No Known Allergies. FAMILY HISTORY     He indicated that his mother is alive. He indicated that his father is . He indicated that the status of his neg hx is unknown. SOCIAL HISTORY       Social History     Tobacco Use    Smoking status: Former     Packs/day: 1.00     Years: 14.00     Pack years: 14.00     Types: Cigarettes     Quit date: 8/3/2002     Years since quittin.4    Smokeless tobacco: Current     Types: Chew    Tobacco comments:     Patient states, \"he chews tobacco.\"    Vaping Use    Vaping Use: Never used   Substance Use Topics    Alcohol use: Yes     Alcohol/week: 0.0 standard drinks     Comment: Patient states, \"seldom. \"    Drug use: No       PHYSICAL EXAM       ED Triage Vitals [23]   BP Temp Temp Source Heart Rate Resp SpO2 Height Weight   129/78 98.1 °F (36.7 °C) Oral 77 18 96 % 6' (1.829 m) (!) 339 lb (153.8 kg)       Additional Vital Signs:  Vitals:    23   BP: 129/78   Pulse: 77   Resp: 18   Temp: 98.1 °F (36.7 °C)   SpO2: 96%     Physical Exam  Constitutional:       Appearance: Normal appearance. He is obese. He is not diaphoretic. Comments: morbidly obese male, equal bilateral blood pressures, equal sensation in all upper extremities, 2+ pitting edema, difficult to auscultate lungs due to body habitus   HENT:      Head: Normocephalic and atraumatic. Right Ear: External ear normal.      Left Ear: External ear normal.      Nose: Nose normal.      Mouth/Throat:      Mouth: Mucous membranes are moist.      Pharynx: Oropharynx is clear. Eyes:      Conjunctiva/sclera: Conjunctivae normal.   Cardiovascular:      Rate and Rhythm: Normal rate and regular rhythm. Heart sounds: No murmur heard. No gallop. Pulmonary:      Effort: Pulmonary effort is normal. No respiratory distress. Breath sounds:  No stridor.   Abdominal:      General: Abdomen is flat. There is no distension.      Palpations: Abdomen is soft.      Tenderness: There is no abdominal tenderness. There is no guarding or rebound.   Musculoskeletal:         General: No tenderness.      Right lower leg: Edema present.      Left lower leg: Edema present.   Skin:     General: Skin is warm and dry.      Capillary Refill: Capillary refill takes less than 2 seconds.      Coloration: Skin is pale.   Neurological:      Mental Status: He is alert.      Sensory: No sensory deficit.      Motor: No weakness.       FORMAL DIAGNOSTIC RESULTS     RADIOLOGY: Interpretation per the Radiologist below, if available at the time of this note (none if blank):    XR CHEST PORTABLE    (Results Pending)   CTA CHEST W WO CONTRAST    (Results Pending)       LABS: (none if blank)  Labs Reviewed   COVID-19 & INFLUENZA COMBO   CBC WITH AUTO DIFFERENTIAL   BASIC METABOLIC PANEL   TROPONIN   BRAIN NATRIURETIC PEPTIDE       (Any cultures that may have been sent were not resulted at the time of this patient visit)    MEDICAL DECISION MAKING / ED COURSE:     Assessment and Plan:   Hello, I have a patient for admission. This is a 58-year-old male, history of diabetes, hypertension, hyperlipidemia, CAD with 4 stents, most recent cath 2010, most recent stress test 2018 which was not negative, presenting with his baseline shortness of breath and chest pain worsening, and no longer completely relieved by nitro. Pain is midsternal, pressure, radiating to left arm back, worse with exertion, mildly improved with nitro, improving with repeat nitro doses. Physical exam significant for morbidly obese male, 2+ pitting edema bilaterally, no acute distress. Differential diagnoses include not limited to pneumonia, COVID, flu, ACS, unstable angina. Patient history most consistent with unstable angina. Labs unremarkable, troponin negative, COVID and flu negative. Chest x-ray shows platelike atelectasis  in right lower lung field. CTA chest does not show any dissection, does show chronic findings of adrenal adenoma, renal cyst. EKG shows inversion in lead III, otherwise appears to be at his baseline. Heart score 6. Would like to admit this patient for further management of unstable angina, patient would likely benefit from cath. Significant Clinical Scoring:    Heart Score    Heart Score for chest pain patients  History: Highly Suspicious  ECG: Non-Specifc repolarization disturbance/LBTB/PM  Patient Age: > 39 and < 65 years  *Risk factors for Atherosclerotic disease: Cigarette smoking, Hypertension, Hypercholesterolemia, Obesity, Coronary Artery Disease  Risk Factors: > 3 Risk factors or history of atherosclerotic disease*  Troponin: < 1X normal limit  Heart Score Total: 6    HEART Score recommendations:  Score 0 - 3:   <1.7%  risk of MACE over next 6 wks = Outpatient workup  Score 4 - 6: 12-16% risk of MACE over next 6 wks = Admission for observation  Score 7- 10: 50-65% risk of MACE over next 6 wks = Early invasive strategy    MACE: Major Acute Cardiac Event (All Cause Mortality, AMI or revascularization)  Chest Pain in the Emergency Room: A Multicenter Validation of the HEART Score. Chace BE, Curtis AJ, Chucky MILDRED, Mast TP, [employer], Mast EG, Rodney SH, [employer] [place] [address] [address]. Crit Pathw Cardiol. 2010 Sep; 9(3): 164-169. \"A prospective validation of the HEART score for chest pain patients at the emergency department. \" Int J Cardiol. 2013 Oct 3;168(3):2153-8. doi: 10.1016/j.ijcard. 2013.01.255. Epub 2013 Mar 7. ED Reassessment: Condition, sign increase in pain. Patient already has received aspirin, will start nitro drip for pain.               Shared Decision-Making was performed and disposition discussed with the        Patient/Family and questions answered         Social determinants of health impacting treatment or disposition:  none         Code Status:  full    Medical Decision Making  Amount and/or Complexity of Data Reviewed  Labs: ordered. Radiology: ordered and independent interpretation performed. ECG/medicine tests: ordered and independent interpretation performed. Risk  Prescription drug management. Drug therapy requiring intensive monitoring for toxicity. Decision regarding hospitalization. Vitals Reviewed:    Vitals:    01/26/23 0736   BP: 129/78   Pulse: 77   Resp: 18   Temp: 98.1 °F (36.7 °C)   TempSrc: Oral   SpO2: 96%   Weight: (!) 339 lb (153.8 kg)   Height: 6' (1.829 m)       The patient was seen and examined. Appropriate diagnostic testing was performed and results reviewed with the patient. Nursing notes reviewed. The results of pertinent diagnostic studies and exam findings were discussed. The patients provisional diagnosis and plan of care were discussed with the patient and present family who expressed understanding. Any medications were reviewed and indications and risks of medications were discussed with the patient /family present. ED Medications administered this visit:  (None if blank)  Medications   acetaminophen (TYLENOL) tablet 1,000 mg (has no administration in time range)         DISCHARGE PRESCRIPTIONS: (None if blank)  New Prescriptions    No medications on file       FINAL IMPRESSION      1. Chest pain, unspecified type    2. Unstable angina (HCC)          DISPOSITION/PLAN   Condition: condition: stable  Dispo: Admit to telemetry        This transcription was electronically signed. Parts of this transcriptions may have been dictated by use of voice recognition software and electronically transcribed, and parts may have been transcribed with the assistance of an ED scribe. The transcription may contain errors not detected in proofreading. Please refer to my supervising physician's documentation if my documentation differs.     Electronically Signed: Pramod Beatty MD, 01/26/23, 8:03 AM        Pramod Beatty MD  Resident  01/26/23 3552

## 2023-01-26 NOTE — ED NOTES
Patient:   ROCKY LUX            MRN: [id number]            FIN: [id number]              Age:   56 years     Sex:  MALE     :  59   Associated Diagnoses:   None   Author:   ASHLEY LARSON     Subjective   Chief complaint events noted, patient is having difficulty with his speech, no n/v/.d.       Health Status   Current medications:  (Selected)   Inpatient Medications  Ordered  [RESTRICTED] linezolid oral 600 mg tablet: 600 mg = 1 tab, Oral, Q12H, Rationale: Therapeutic (documented infection), Indication: Skin/Soft tissue infection, Routine, Order Start: 19 21:00:00 CDT, Order Stop: 19 20:59:00 CST, Tab  ciprofloxacin oral 500 mg tablet (Cipro): 500 mg = 1 tab, Oral, Daily, Rationale: Therapeutic (documented infection), Indication: Skin/Soft tissue infection, Routine, Order Start: 19 12:00:00 CST, x 10 doses, Order Stop: 19 12:00:00 CST, Tab  vancomycin oral solution: 125 mg = 2.5 mL, Oral, Daily, Rationale: Medical Prophylaxis, Indication: Clostridium difficile, Routine, Order Start: 10/30/19 9:00:00 CDT, x 56 doses, Order Stop: 19 8:59:00 CST, Oral Soln     Objective   VS/Measurements   Vital Signs   19 11:46 CST Temperature - VS 36.2 deg_C  Normal    Temperature Source - VS Temporal    Heart/Pulse Rate 68  Normal    Pulse Source Monitor    Respiration Rate 16 breaths/min  Normal    SpO2 97 %  Normal    NIBP Systolic 130  Normal    NIBP Diastolic 92  HI    NIBP Source Right Arm    NIBP   Normal    SN Alarms Set and Appropriate Patient Alarms Set And Appropriate For Patient       General:  No acute distress.    Respiratory:  Lungs are clear to auscultation.    Cardiovascular:  Normal rate, Regular rhythm.    Gastrointestinal:  Soft, Non-tender, Non-distended, nephrostomy in place.   Integumentary:  No rash.      Results Review   General results   Interpretation:   Labs between:  2019 14:41 to 2019 14:41  CBC:                 WBC  HgB  Hct  Plt  Pt transported to 6K26 by cart in stable condition.   Called [facility] and informed the charge nurse that the patient was on their way to the unit.      Ida Cho LPN  01/26/23 1115      MCV  RDW   06-NOV-2019 7.0  (L) 8.8  (L) 28.6  364  84.6  14.3   DIFF:                 Seg  Neutroph//ABS  Lymph//ABS  Mono//ABS  EOS/ABS  06-NOV-2019 NOT APPLICABLE  70 // 4.9 19 // 1.3 5 // 0.4 5 // 0.3  BMP:                 Na  Cl  BUN  Glu   06-NOV-2019 139  (H) 111  (H) 34  (H) 113                              K  CO2  Cr  Ca                              4.7  22  (H) 2.65  (L) 8.3                          Impression and Plan   Assessment and Plan:       Diagnosis:   IMPRESSION  -  Abdominal abscess, status post IR drainage   s/p drain removal with resolution of abscess  -  Obstructing kidney stone and solitary kidney, status post percutaneous nephrostomy  -  Neurogenic bladder/paraplegia  -  H/O CDI  -  CKD  PLAN  -in light of resolution of abscess and drain removal  will complete abx today  will follow  d/w Dr Rodriguez   .

## 2023-01-26 NOTE — ED NOTES
Pt denies chest pain at this time. Dr. Garett Gandhi notified and nitro not started.      Pablo Diaz RN  01/26/23 4666

## 2023-01-26 NOTE — ED NOTES
ED to inpatient nurses report    Chief Complaint   Patient presents with    Chest Pain      Present to ED from home  LOC: alert and orientated to name, place, date  Vital signs   Vitals:    01/26/23 0736 01/26/23 0806 01/26/23 0932   BP: 129/78 111/65 138/81   Pulse: 77 71 75   Resp: 18 18 18   Temp: 98.1 °F (36.7 °C)     TempSrc: Oral     SpO2: 96% 99% 100%   Weight: (!) 339 lb (153.8 kg)     Height: 6' (1.829 m)        Oxygen Baseline room air    Current needs required room air Bipap/Cpap No  LDAs:   Peripheral IV 01/26/23 Left Antecubital (Active)   Site Assessment Clean, dry & intact 01/26/23 0741   Line Status Normal saline locked 01/26/23 0741   Dressing Status Clean, dry & intact 01/26/23 0741     Mobility: Independent  Pending ED orders: complete  Present condition: stable      C-SSRS    Swallow Screening    Preferred Language: [place]     Electronically signed by Zara Dorantes RN on 1/26/2023 at 10:06 AM       Zara Dorantes RN  01/26/23 1007

## 2023-01-26 NOTE — RT PROTOCOL NOTE
RT Inhaler-Nebulizer Bronchodilator Protocol Note    There is a bronchodilator order in the chart from a provider indicating to follow the RT Bronchodilator Protocol and there is an Initiate RT Inhaler-Nebulizer Bronchodilator Protocol order as well (see protocol at bottom of note). CXR Findings:  XR CHEST PORTABLE    Result Date: 1/26/2023  1. No acute cardiopulmonary finding. 2. Platelike subsegmental atelectasis in the right lower lung zone. **This report has been created using voice recognition software. It may contain minor errors which are inherent in voice recognition technology. ** Final report electronically signed by Dr Lou Rosales on 1/26/2023 8:21 AM      The findings from the last RT Protocol Assessment were as follows:   History Pulmonary Disease: Chronic pulmonary disease (Asthma)  Respiratory Pattern: Regular pattern and RR 12-20 bpm  Breath Sounds: Clear breath sounds  Cough: Strong, spontaneous, non-productive  Indication for Bronchodilator Therapy: On home bronchodilators (prn at home)  Bronchodilator Assessment Score: 2    Aerosolized bronchodilator medication orders have been revised according to the RT Inhaler-Nebulizer Bronchodilator Protocol below. Respiratory Therapist to perform RT Therapy Protocol Assessment initially then follow the protocol. Repeat RT Therapy Protocol Assessment PRN for score 0-3 or on second treatment, BID, and PRN for scores above 3. No Indications - adjust the frequency to every 6 hours PRN wheezing or bronchospasm, if no treatments needed after 48 hours then discontinue using Per Protocol order mode. If indication present, adjust the RT bronchodilator orders based on the Bronchodilator Assessment Score as indicated below.   Use Inhaler orders unless patient has one or more of the following: on home nebulizer, not able to hold breath for 10 seconds, is not alert and oriented, cannot activate and use MDI correctly, or respiratory rate 25 breaths per minute or more, then use the equivalent nebulizer order(s) with same Frequency and PRN reasons based on the score.  If a patient is on this medication at home then do not decrease Frequency below that used at home.    0-3 - enter or revise RT bronchodilator order(s) to equivalent RT Bronchodilator order with Frequency of every 4 hours PRN for wheezing or increased work of breathing using Per Protocol order mode.        4-6 - enter or revise RT Bronchodilator order(s) to two equivalent RT bronchodilator orders with one order with BID Frequency and one order with Frequency of every 4 hours PRN wheezing or increased work of breathing using Per Protocol order mode.        7-10 - enter or revise RT Bronchodilator order(s) to two equivalent RT bronchodilator orders with one order with TID Frequency and one order with Frequency of every 4 hours PRN wheezing or increased work of breathing using Per Protocol order mode.       11-13 - enter or revise RT Bronchodilator order(s) to one equivalent RT bronchodilator order with QID Frequency and an Albuterol order with Frequency of every 4 hours PRN wheezing or increased work of breathing using Per Protocol order mode.      Greater than 13 - enter or revise RT Bronchodilator order(s) to one equivalent RT bronchodilator order with every 4 hours Frequency and an Albuterol order with Frequency of every 2 hours PRN wheezing or increased work of breathing using Per Protocol order mode.     RT to enter RT Home Evaluation for COPD & MDI Assessment order using Per Protocol order mode.    Electronically signed by Anastasia Loza RCP on 1/26/2023 at 3:52 PM

## 2023-01-26 NOTE — ED TRIAGE NOTES
Pt presents to the ED via EMS for chest pain that started around  as he was walking into work. Pt took 2 nitro and EMS gave 324mg aspirin. Pt states his pain has been improving.  Pt states he is \"just a little\" SOB

## 2023-01-26 NOTE — CARE COORDINATION
Spoke with patient and significant other bedside who advised has ACP documents at home. Will have family bring in this visit and attach to chart. Denied concerns or questions.

## 2023-01-26 NOTE — PROGRESS NOTES
Pt admitted to  6[facility]26 in a wheelchair. Complaints: Chest pain / discomfort. IV none infusing. IV site free of s/s of infection or infiltration. Vital signs obtained. Assessment and data collection initiated. Two nurse skin assessment performed by Amrita Middleton RN and Fanta Marte RN. Oriented to room. Policies and procedures for [facility] explained. Amrita Middleton RN discussed hourly rounding with patient addressing 5 P's. Fall prevention and safety brochure discussed with patient. Bed alarm on. Call light in reach.

## 2023-01-26 NOTE — PROGRESS NOTES
Attempted to call Dr Jarvis Board office to notify of pt Observation status, no answer or option to leave VM at this time.  Plan to try later in shift

## 2023-01-27 PROBLEM — I20.0 UNSTABLE ANGINA (HCC): Status: ACTIVE | Noted: 2023-01-27

## 2023-01-27 LAB
ANION GAP SERPL CALC-SCNC: 10 MEQ/L (ref 8–16)
APTT PPP: 30.3 SECONDS (ref 22–38)
APTT PPP: 33.9 SECONDS (ref 22–38)
BUN SERPL-MCNC: 10 MG/DL (ref 7–22)
CALCIUM SERPL-MCNC: 8.4 MG/DL (ref 8.5–10.5)
CHLORIDE SERPL-SCNC: 107 MEQ/L (ref 98–111)
CO2 SERPL-SCNC: 23 MEQ/L (ref 23–33)
CREAT SERPL-MCNC: 0.6 MG/DL (ref 0.4–1.2)
DEPRECATED RDW RBC AUTO: 43.8 FL (ref 35–45)
DEPRECATED RDW RBC AUTO: 45.1 FL (ref 35–45)
EKG ATRIAL RATE: 74 BPM
EKG P AXIS: 7 DEGREES
EKG P-R INTERVAL: 120 MS
EKG Q-T INTERVAL: 422 MS
EKG QRS DURATION: 98 MS
EKG QTC CALCULATION (BAZETT): 468 MS
EKG R AXIS: 15 DEGREES
EKG T AXIS: 12 DEGREES
EKG VENTRICULAR RATE: 74 BPM
ERYTHROCYTE [DISTWIDTH] IN BLOOD BY AUTOMATED COUNT: 14.8 % (ref 11.5–14.5)
ERYTHROCYTE [DISTWIDTH] IN BLOOD BY AUTOMATED COUNT: 14.9 % (ref 11.5–14.5)
GFR SERPL CREATININE-BSD FRML MDRD: > 60 ML/MIN/1.73M2
GLUCOSE SERPL-MCNC: 125 MG/DL (ref 70–108)
HCT VFR BLD AUTO: 38.4 % (ref 42–52)
HCT VFR BLD AUTO: 38.9 % (ref 42–52)
HGB BLD-MCNC: 11.9 GM/DL (ref 14–18)
HGB BLD-MCNC: 12.3 GM/DL (ref 14–18)
INR PPP: 1.04 (ref 0.85–1.13)
LV EF: 63 %
LVEF MODALITY: NORMAL
MCH RBC QN AUTO: 25.9 PG (ref 26–33)
MCH RBC QN AUTO: 25.9 PG (ref 26–33)
MCHC RBC AUTO-ENTMCNC: 31 GM/DL (ref 32.2–35.5)
MCHC RBC AUTO-ENTMCNC: 31.6 GM/DL (ref 32.2–35.5)
MCV RBC AUTO: 82.1 FL (ref 80–94)
MCV RBC AUTO: 83.5 FL (ref 80–94)
PLATELET # BLD AUTO: 277 THOU/MM3 (ref 130–400)
PLATELET # BLD AUTO: 279 THOU/MM3 (ref 130–400)
PMV BLD AUTO: 9.2 FL (ref 9.4–12.4)
PMV BLD AUTO: 9.2 FL (ref 9.4–12.4)
POTASSIUM SERPL-SCNC: 4 MEQ/L (ref 3.5–5.2)
RBC # BLD AUTO: 4.6 MILL/MM3 (ref 4.7–6.1)
RBC # BLD AUTO: 4.74 MILL/MM3 (ref 4.7–6.1)
SODIUM SERPL-SCNC: 140 MEQ/L (ref 135–145)
WBC # BLD AUTO: 5.3 THOU/MM3 (ref 4.8–10.8)
WBC # BLD AUTO: 5.6 THOU/MM3 (ref 4.8–10.8)

## 2023-01-27 PROCEDURE — 96374 THER/PROPH/DIAG INJ IV PUSH: CPT

## 2023-01-27 PROCEDURE — 6360000002 HC RX W HCPCS

## 2023-01-27 PROCEDURE — 6370000000 HC RX 637 (ALT 250 FOR IP): Performed by: PHYSICIAN ASSISTANT

## 2023-01-27 PROCEDURE — 93306 TTE W/DOPPLER COMPLETE: CPT

## 2023-01-27 PROCEDURE — 36415 COLL VENOUS BLD VENIPUNCTURE: CPT

## 2023-01-27 PROCEDURE — 85730 THROMBOPLASTIN TIME PARTIAL: CPT

## 2023-01-27 PROCEDURE — 80048 BASIC METABOLIC PNL TOTAL CA: CPT

## 2023-01-27 PROCEDURE — G0378 HOSPITAL OBSERVATION PER HR: HCPCS

## 2023-01-27 PROCEDURE — 99232 SBSQ HOSP IP/OBS MODERATE 35: CPT | Performed by: NURSE PRACTITIONER

## 2023-01-27 PROCEDURE — 96376 TX/PRO/DX INJ SAME DRUG ADON: CPT

## 2023-01-27 PROCEDURE — 85610 PROTHROMBIN TIME: CPT

## 2023-01-27 PROCEDURE — 85027 COMPLETE CBC AUTOMATED: CPT

## 2023-01-27 PROCEDURE — 99223 1ST HOSP IP/OBS HIGH 75: CPT | Performed by: INTERNAL MEDICINE

## 2023-01-27 PROCEDURE — 2580000003 HC RX 258: Performed by: PHYSICIAN ASSISTANT

## 2023-01-27 RX ORDER — HEPARIN SODIUM 10000 [USP'U]/100ML
5-30 INJECTION, SOLUTION INTRAVENOUS CONTINUOUS
Status: DISCONTINUED | OUTPATIENT
Start: 2023-01-27 | End: 2023-01-28

## 2023-01-27 RX ORDER — HEPARIN SODIUM 1000 [USP'U]/ML
4000 INJECTION, SOLUTION INTRAVENOUS; SUBCUTANEOUS ONCE
Status: COMPLETED | OUTPATIENT
Start: 2023-01-27 | End: 2023-01-27

## 2023-01-27 RX ORDER — HEPARIN SODIUM 1000 [USP'U]/ML
4000 INJECTION, SOLUTION INTRAVENOUS; SUBCUTANEOUS PRN
Status: DISCONTINUED | OUTPATIENT
Start: 2023-01-27 | End: 2023-01-28

## 2023-01-27 RX ORDER — HEPARIN SODIUM 1000 [USP'U]/ML
2000 INJECTION, SOLUTION INTRAVENOUS; SUBCUTANEOUS PRN
Status: DISCONTINUED | OUTPATIENT
Start: 2023-01-27 | End: 2023-01-28

## 2023-01-27 RX ADMIN — HEPARIN SODIUM 6 UNITS/KG/HR: 10000 INJECTION, SOLUTION INTRAVENOUS at 11:58

## 2023-01-27 RX ADMIN — HEPARIN SODIUM 4000 UNITS: 1000 INJECTION, SOLUTION INTRAVENOUS; SUBCUTANEOUS at 19:25

## 2023-01-27 RX ADMIN — ASPIRIN 81 MG: 81 TABLET ORAL at 10:16

## 2023-01-27 RX ADMIN — HEPARIN SODIUM 4000 UNITS: 1000 INJECTION, SOLUTION INTRAVENOUS; SUBCUTANEOUS at 11:55

## 2023-01-27 RX ADMIN — METOPROLOL SUCCINATE 25 MG: 25 TABLET, EXTENDED RELEASE ORAL at 10:15

## 2023-01-27 RX ADMIN — METOPROLOL SUCCINATE 25 MG: 25 TABLET, EXTENDED RELEASE ORAL at 21:43

## 2023-01-27 RX ADMIN — SODIUM CHLORIDE, PRESERVATIVE FREE 10 ML: 5 INJECTION INTRAVENOUS at 21:44

## 2023-01-27 RX ADMIN — CETIRIZINE HYDROCHLORIDE 10 MG: 10 TABLET, FILM COATED ORAL at 10:15

## 2023-01-27 RX ADMIN — SODIUM CHLORIDE, PRESERVATIVE FREE 10 ML: 5 INJECTION INTRAVENOUS at 10:14

## 2023-01-27 RX ADMIN — PANTOPRAZOLE SODIUM 40 MG: 40 TABLET, DELAYED RELEASE ORAL at 21:44

## 2023-01-27 RX ADMIN — ATORVASTATIN CALCIUM 40 MG: 40 TABLET, FILM COATED ORAL at 10:15

## 2023-01-27 ASSESSMENT — PAIN SCALES - GENERAL
PAINLEVEL_OUTOF10: 0
PAINLEVEL_OUTOF10: 0

## 2023-01-27 NOTE — PLAN OF CARE
Problem: Discharge Planning  Goal: Discharge to home or other facility with appropriate resources  Outcome: Progressing  Flowsheets (Taken 1/27/2023 1413)  Discharge to home or other facility with appropriate resources: Identify barriers to discharge with patient and caregiver     Problem: Chronic Conditions and Co-morbidities  Goal: Patient's chronic conditions and co-morbidity symptoms are monitored and maintained or improved  Outcome: Progressing  Flowsheets (Taken 1/27/2023 1413)  Care Plan - Patient's Chronic Conditions and Co-Morbidity Symptoms are Monitored and Maintained or Improved: Monitor and assess patient's chronic conditions and comorbid symptoms for stability, deterioration, or improvement     Problem: Pain  Goal: Verbalizes/displays adequate comfort level or baseline comfort level  Outcome: Progressing

## 2023-01-27 NOTE — CONSULTS
[facility]    Patient's Name/Date of Birth: Moss Pallas / 1965 (44 y.o.)    Date: January 27, 2023     Referring Provider: KANIKA Corey *    CHIEF COMPLAINT: Chest pain      HPI: This is a pleasant 59 y.o. male with PMH of CAD s/p PCI, HTN, KIRAN on CPAP, GERD, HLD who presents with chest pain with associated SOB. Patient states that the pain began while he was at work and was substernal. He also reports associated pain in between his shoulder blades. Patient states that the chest pain was not relieved with rest. Patient took one dose of SL nitroglycerin at work which did not relieve his chest pain. Patient states that this pain is similar to the pain he felt in the past when he required stents. Patient describes the chest pain as a pressure and discomfort. Patient denies any current chest pain. He states that his chest pain was relieved during his stay in the ED. Patient also reports worsening lower extremity swelling and increased SOB over the last few months. He states that this has never happened previously. Patient reports compliance to his home mediations. Echo: 10/4/2018  Summary   Technically difficult examination. Ejection fraction is visually estimated at 60%. Overall left ventricular function is normal.   The aortic valve leaflets were not well visualized. Aortic valve appears tricuspid. All labs, EKG's, diagnostic testing and images as well as cardiac cath, stress testing were reviewed during this encounter    Past Medical History:   Diagnosis Date    Anxiety attacks     Anxiety due to family issues. Arthritis     Asthma     as child    Atypical chest pain     Cuevas esophagus     Chronic back pain     Coronary artery disease     S/P stent of the LAD by Dr. Muriel Ayala on 2 23 2010.     Diabetes mellitus, type 2 (HCC)     Erectile dysfunction     Family history of premature CAD     Gastroesophageal reflux disease     Groin hematoma Herniated disc 2011    back and neck    History of erectile dysfunction 2008    History of giardia infection     History of Giardia. History of iron deficiency     History of pulmonary embolus (PE) 1980's. Remote history of pulmonary embolus and negative computed tomography for pulmonary embolus. History of smoking     Hyperlipidemia     Well managed. Hypogonadism, male     The patient has central hypogonadism. Joint pain     Morbid obesity     KIRAN (obstructive sleep apnea)     S/P angioplasty     S/P PTCA: 1/12/2015: FFR-guided stenting of distal and mid-LAD using Xience Alpine 2.5X15, post-2.77, and Alpine 2.75X18 mm, post-3.18 mm.  01/12/2015 1/12/2015: FFR-guided stenting of distal and mid-LAD using Xience Alpine 2.5 X 15 mm, post-dilated to 2.77 mm, and Xience Alpine 2.75 X 18 mm, post-dilated to 3.18 mm. Dr. Dawn Rodriguez   2/22/2010: Stenting of distal LAD using Xience 2.5 X 15 overlapping distally with Xience 2.5 X 12 mm, Dr. Lozano Jazmín    Testosterone deficiency     Thyroid nodule     The patient is euthyroid. Past Surgical History:   Procedure Laterality Date    APPENDECTOMY      BACK SURGERY      X 2 FOR HERNIATED DISCS    CARDIOVASCULAR STRESS TEST  3 26 2010    Mild chest discomfort induced by IV Lexiscan that resolved spontaneously. No arrhythmias. No EKG changes to suggest ischemia. [address]    COLONOSCOPY  2013    CORONARY ANGIOPLASTY      X 2 STENTS    DIAGNOSTIC CARDIAC CATH LAB PROCEDURE  2 22 2010    Successful drug-eluting stent x 2 of mid LAD. LV borderline normal LV function. EF 50-55%. No wall motion abnormalities detected and no MR. Normal hemodynamics. Coronaries - diffuse left anterior descending (LAD) disease with proximal 50% and distal around 70%.      [id number]    right    KNEE SURGERY  1980    left    LYMPH NODE BIOPSY  2011    neck    SHOULDER ARTHROSCOPY      SPINE SURGERY  1993, 2000    UPPER GASTROINTESTINAL ENDOSCOPY  2013    US THYROID CYST ASPIRATION AND OR INJECTION  2/23/2021    US THYROID CYST ASPIRATION AND OR INJECTION 2/23/2021 [facility] ULTRASOUND     Current Facility-Administered Medications   Medication Dose Route Frequency Provider Last Rate Last Admin    heparin (porcine) injection 9,230 Units  60 Units/kg IntraVENous Once Ariadna Noss, DO        heparin (porcine) injection 9,230 Units  60 Units/kg IntraVENous PRN Ariadna Noss, DO        heparin (porcine) injection 4,610 Units  30 Units/kg IntraVENous PRN Ariadna Noss, DO        heparin 25,000 units in dextrose 5% 250 mL (premix) infusion  5-30 Units/kg/hr IntraVENous Continuous Ariadna Noss, DO        albuterol sulfate HFA (PROVENTIL;VENTOLIN;PROAIR) 108 (90 Base) MCG/ACT inhaler 2 puff  2 puff Inhalation Q4H PRN Brooke Luciano PA-C        aspirin EC tablet 81 mg  81 mg Oral Daily Brooke Luciano PA-C   81 mg at 01/27/23 1016    atorvastatin (LIPITOR) tablet 40 mg  40 mg Oral Daily Brooke Luciano PA-C   40 mg at 01/27/23 1015    metoprolol succinate (TOPROL XL) extended release tablet 25 mg  25 mg Oral BID Brooke Luciano PA-C   25 mg at 01/27/23 1015    pantoprazole (PROTONIX) tablet 40 mg  40 mg Oral BID AC Brooke Luciano PA-C   40 mg at 01/26/23 1717    sodium chloride flush 0.9 % injection 5-40 mL  5-40 mL IntraVENous 2 times per day Brooke Luciano PA-C   10 mL at 01/27/23 1014    sodium chloride flush 0.9 % injection 5-40 mL  5-40 mL IntraVENous PRN Brooke Luciano PA-C        0.9 % sodium chloride infusion   IntraVENous PRN Brooke Luciano PA-C        [Held by provider] enoxaparin (LOVENOX) injection 40 mg  40 mg SubCUTAneous BID Brooke Luciano PA-C   40 mg at 01/26/23 2040    ondansetron (ZOFRAN-ODT) disintegrating tablet 4 mg  4 mg Oral Q8H PRN Brooke Luciano PA-C        Or    ondansetron (ZOFRAN) injection 4 mg  4 mg IntraVENous Q6H PRN Brooke Luciano PA-C        polyethylene glycol (GLYCOLAX) packet 17 g  17 g Oral Daily PRN LUZ SimpsonC        acetaminophen (TYLENOL) tablet 650 mg  650 mg Oral Q6H PRN Brooke Luciano PA-C        Or    acetaminophen (TYLENOL) suppository 650 mg  650 mg Rectal Q6H PRN JEFF Simpson-C        potassium chloride (KLOR-CON M) extended release tablet 40 mEq  40 mEq Oral PRN JEFF Simpson-C        Or    potassium bicarb-citric acid (EFFER-K) effervescent tablet 40 mEq  40 mEq Oral PRN JEFF Simpson-C        Or    potassium chloride 10 mEq/100 mL IVPB (Peripheral Line)  10 mEq IntraVENous PRN Brooke Luciano PA-C        magnesium sulfate 2000 mg in 50 mL IVPB premix  2,000 mg IntraVENous PRN Shashank Mccain PA-C         Prior to Admission medications    Medication Sig Start Date End Date Taking?  Authorizing Provider   Semaglutide (RYBELSUS) 7 MG TABS TAKE 1 TABLET BY MOUTH  DAILY 1/10/23   Sathya Cerna DO   penicillin v potassium (VEETID) 500 MG tablet Take 4 tablets by mouth once  Patient not taking: No sig reported 7/28/22   Historical Provider, MD   pantoprazole (PROTONIX) 40 MG tablet TAKE 1 TABLET BY MOUTH  TWICE DAILY 8/11/22   Joseph Albert MD   atorvastatin (LIPITOR) 40 MG tablet Take 1 tablet daily  Patient taking differently: Take 1 tablet every other day 7/1/22   Jeff Hernandez MD   metoprolol succinate (TOPROL XL) 25 MG extended release tablet Take 1 tablet twice a day 7/1/22   Yousif Martines MD   nitroGLYCERIN (NITROSTAT) 0.4 MG SL tablet Place 1 tablet under the tongue every 5 minutes as needed for Chest pain 7/1/22   Jeff Hernandez MD   naproxen (NAPROSYN) 500 MG tablet Take 1 tablet by mouth 2 times daily (with meals)  Patient not taking: Reported on 1/26/2023 6/15/21   Sathya Cerna DO   cetirizine (ZYRTEC) 10 MG tablet Take 1 tablet by mouth daily  Patient not taking: Reported on 1/26/2023 5/27/21   Sathya Cerna DO   fluticasone [facility]) 50 MCG/ACT nasal spray 2 sprays by Each Nostril route daily  Patient not taking: Reported on 1/26/2023 5/27/21   Geraldine Ray DO   albuterol sulfate  (90 Base) MCG/ACT inhaler Inhale 2 puffs into the lungs every 4 hours as needed for Wheezing or Shortness of Breath  Patient not taking: Reported on 1/26/2023 12/30/20   Lanita Gitelman, APRN - CNP   Multiple Vitamins-Minerals (THERAPEUTIC MULTIVITAMIN-MINERALS) tablet Take 1 tablet by mouth daily    Historical Provider, MD   aspirin 81 MG tablet Take 81 mg by mouth daily. Historical Provider, MD   Cyanocobalamin (VITAMIN B 12 PO) Take 1,000 mg by mouth. Historical Provider, MD   Scheduled Meds:   heparin (porcine)  60 Units/kg IntraVENous Once    aspirin  81 mg Oral Daily    atorvastatin  40 mg Oral Daily    metoprolol succinate  25 mg Oral BID    pantoprazole  40 mg Oral BID AC    sodium chloride flush  5-40 mL IntraVENous 2 times per day    [Held by provider] enoxaparin  40 mg SubCUTAneous BID     Continuous Infusions:   heparin (PORCINE) Infusion      sodium chloride       PRN Meds:.heparin (porcine), heparin (porcine), albuterol sulfate HFA, sodium chloride flush, sodium chloride, ondansetron **OR** ondansetron, polyethylene glycol, acetaminophen **OR** acetaminophen, potassium chloride **OR** potassium alternative oral replacement **OR** potassium chloride, magnesium sulfate    No Known Allergies  Family History   Problem Relation Age of Onset    Heart Disease Mother         Bypass/Surgery. Cancer Mother         Breast cancer. Coronary Art Dis Mother         History of CAD with myocardial infarction and open heart surgery in her 63's. COPD Mother     Heart Disease Father         Bypass/Surgery. Cancer Father         Leukemia. Coronary Art Dis Father         History of CAD with myocardial infarction and open heart surgery around his 52's.      Prostate Cancer Neg Hx      Social History     Socioeconomic History    Marital status: Single     Spouse name: Not on file    Number of children: Not on file    Years of education: 12    Highest education level: 12th grade   Occupational History    Not on file   Tobacco Use    Smoking status: Former     Packs/day: 1.00     Years: 14.00     Pack years: 14.00     Types: Cigarettes     Quit date: 8/3/2002     Years since quittin.4    Smokeless tobacco: Current     Types: Chew    Tobacco comments:     Patient states, \"he chews tobacco.\"    Vaping Use    Vaping Use: Never used   Substance and Sexual Activity    Alcohol use: Yes     Alcohol/week: 0.0 standard drinks     Comment: Patient states, \"seldom. \"    Drug use: No    Sexual activity: Yes   Other Topics Concern    Not on file   Social History Narrative    Not on file     Social Determinants of Health     Financial Resource Strain: Low Risk     Difficulty of Paying Living Expenses: Not hard at all   Food Insecurity: No Food Insecurity    Worried About Running Out of Food in the Last Year: Never true    Ran Out of Food in the Last Year: Never true   Transportation Needs: Not on file   Physical Activity: Not on file   Stress: Not on file   Social Connections: Not on file   Intimate Partner Violence: Not on file   Housing Stability: Not on file     ROS:   Constitutional: Denies any recent wt change. Eyes:  Denies any blurring or double vision, no glaucoma  Ears/Nose/Mouth/Throat:  Denies any chronic sinus/rhinitis, bleeding gums  Cardiovascular:  As described above. Respiratory:  Denies any frequent cough, wheezing or coughing up blood  Genitourinary:  Denies difficulty with urination and kidney stones  Gastrointestinal:  Denies any chronic problems with abdominal pain, nausea, vomiting or diarrhea  Musculoskeletal:  Denies any joint pain, back pain, or difficulty walking  Integumentary:  Denies any rash  Neurological:  No numbness or tingling  Endocrine:  Denies any polydipsia. Hematologic/Lymphatic:  Denies any hemorrhage or lymphatic drainage problems.   Labs:  CBC:   Recent Labs 01/26/23  0801 01/27/23  0407   WBC 5.2 5.6   HGB 11.5* 11.9*   HCT 36.7* 38.4*   MCV 82.1 83.5    277     BMP:   Recent Labs     01/26/23  0801 01/27/23  0407    140   K 4.1 4.0    107   CO2 21* 23   BUN 14 10   CREATININE 0.6 0.6     Accucheck Glucoses: No results for input(s): POCGLU in the last 72 hours. Cardiac Enzymes: No results for input(s): CKTOTAL, CKMB, CKMBINDEX, TROPONINI in the last 72 hours. PT/INR: No results for input(s): PROTIME, INR in the last 72 hours. APTT: No results for input(s): APTT in the last 72 hours.   Liver Profile:  Lab Results   Component Value Date/Time    AST 23 02/11/2022 11:32 AM    ALT 29 02/11/2022 11:32 AM    BILIDIR <0.2 09/02/2017 11:14 AM    BILITOT 0.4 02/11/2022 11:32 AM    ALKPHOS 99 02/11/2022 11:32 AM     Lab Results   Component Value Date/Time    CHOL 109 02/11/2022 11:32 AM    HDL 44 02/11/2022 11:32 AM    TRIG 94 02/11/2022 11:32 AM     TSH:   Lab Results   Component Value Date/Time    TSH 1.340 02/11/2022 11:32 AM     UA:   Lab Results   Component Value Date/Time    NITRITE Negative 02/05/2013 04:22 PM    LEUKOCYTESUR Negative 02/05/2013 04:22 PM    BLOODU Negative 02/05/2013 04:22 PM         Physical Exam:  Vitals:    01/27/23 0803   BP: (!) 158/68   Pulse: 75   Resp: 18   Temp: 97.7 °F (36.5 °C)   SpO2: 98%    No intake or output data in the 24 hours ending 01/27/23 1108   General:  No acute distress   Neck: Supple, no JVD, no carotid bruits  Heart: Regular rhythm normal S1 and S2, no rubs, murmurs or gallops  Lungs: clear to ascultation no rales, wheezes, or rhonchi  Abdomen: positive bowel sounds, soft, non-tender, non-distended, no bruits, no masses  Extremities: nonpitting lower extremity edema  Neurologic: alert and oriented x 3, cranial nerves 2-12 grossly intact, motor and sensory intact, moving all extremities  Skin: No rashes  Psych: AO x 3, no depression/gala, no pressured speech, normal affect  Lymph: No obvious LAD      Assessment & Plan:  Unstable Angina: Patient reports substernal chest pian that occurred at work that was not relieved with rest. Patient had associated SOB and pain between his shoulder blades. He states this pain is the same as when he previously required stents, however the pain between the shoulder blades is new. Patient took one dose of SL nitroglycerin prior at home which did not relieve his pain. Patient states his pain was relieved while he was in the ED. Patient denies any current chest pain. Heart Score 5. Elester Carlos intermediate risk. Troponin x3 < 0.010. EKG shows NSR with no ST abnormalities. CTA chest shows calcifications in the LAD. Patient requires LHC. Start Heparin drip. Echo pending. Hold Lovenox. Patient already on beta blocker and statin. CAD s/p PCI: LHC on 1/12/2015 shows in-stent restenosis of LAD stent and 50% mid LAD stenosis. Patient received 2 stents at that time. Patient reports having 2 stents placed in LAD in 2010. No other information regarding CAD in EMR. Thank you for allowing us to participate in the care of this patient. Please do not hesitate to call us with questions. Electronically signed by Prudence Queen DO on 1/27/2023 at 11:08 AM    Attending Supervising Physician's Attestation Statement  I performed a history and physical examination on the patient and discussed the management with the resident physician. I reviewed and agree with the findings and plan as documented in the resident's note except for as noted below. Findings consistent with [place]. Feels similar to prior pain. GDMT for ACS, Heparin gtt, TTE.  NPO, cardiac cath. Further recommendations based on results and clinical course.       Electronically signed by Yony Cherry MD on 1/27/23 at 5:01 PM EST  Interventional Cardiology - [facility]

## 2023-01-27 NOTE — CARE COORDINATION
Case Management Assessment  Initial Evaluation    Date/Time of Evaluation: 1/27/2023 1:44 PM  Assessment Completed by: Sapna Dorado RN    If patient is discharged prior to next notation, then this note serves as note for discharge by case management. Patient Name: Grace Bishop                   YOB: 1965  Diagnosis: Unstable angina ([facility] [address] [I20.0]  Chest pain [R07.9]  Chest pain, unspecified type [R07.9]                   Date / Time: 1/26/2023  7:33 AM  [facility]: [facility]     Patient Admission Status: Observation   Readmission Risk (Low < 19, Mod (19-27), High > 27): No data recorded  Current PCP: Rodri Current, DO  PCP verified by CM? Yes    Chart Reviewed: Yes      History Provided by: Patient  Patient Orientation: Alert and Oriented    Patient Cognition: Alert    Hospitalization in the last 30 days (Readmission):  No    If yes, Readmission Assessment in CM Navigator will be completed. Advance Directives:      Code Status: Full Code   Patient's Primary Decision Maker is: Legal Next of Kin      Discharge Planning:    Patient lives with: Alone Type of Home: House  Primary Care Giver: Self  Patient Support Systems include: Family Members, Friends/Neighbors   Current Financial resources: None  Current community resources: None  Current services prior to admission: C-pap, Durable Medical Equipment            Current DME: Jose Alberto Cortez            Type of Home Care services:  None    ADLS  Prior functional level: Independent in ADLs/IADLs  Current functional level: Independent in ADLs/IADLs    Family can provide assistance at DC: Yes  Would you like Case Management to discuss the discharge plan with any other family members/significant others, and if so, who?  No  Plans to Return to Present Housing: Yes  Other Identified Issues/Barriers to RETURNING to current housing: None  Potential Assistance needed at discharge: N/A            Potential DME:    Patient expects to discharge to: House  Plan for transportation at discharge: Self    Financial    Payor: BCBS / Plan: Laverne Navarro / Product Type: *No Product type* /     Does insurance require precert for SNF: Yes    Potential assistance Purchasing Medications: No  Meds-to-Beds request: No      [address] [facility] [address] [facility]  [address]  [address] [facility]  Phone: [phone] Fax: [phone]    [facility] ([address] [facility]) - [address] [facility] [phone] - F [phone]  [address] [address]  Phone: [phone] Fax: [phone] Delivery ([facility] ) - [address] [phone] [facility] [phone]  [facility] [address]  Phone: [phone] Fax: [phone]      Notes:    Factors facilitating achievement of predicted outcomes: Family support, Motivated, Cooperative, Pleasant, and Sense of humor    Barriers to discharge: Medical complications and Medication managment    Additional Case Management Notes: Pt admitted through ER with c/o chest pain. Cardiology consulted. Heparin gtt started. Trop negative,     Procedure: 1/26: CTA chest: The thoracic aorta is normal course and caliber. No aortic aneurysm or dissection is present. No acute intrathoracic process is observed. Numerous chronic findings are discussed  1/27: ECHO: Completed-not read    The Plan for Transition of Care is related to the following treatment goals of Unstable angina ([address] [I20.0]  Chest pain [R07.9]  Chest pain, unspecified type [R07.9]    Patient Goals/Plan/Treatment Preferences: Spoke with pt and family. He lives home alone. Works full time. Independent in ADL's. Has home CPAP machine. Cane and walker at home though doesn't use daily. Plan to return home on discharge. Verified PCP and insurance.  Will continue to follow for home going needs.  Transportation/Food Security/Housekeeping Addressed: No issues identified.      Wendi Macario RN  Case Management Department

## 2023-01-28 ENCOUNTER — APPOINTMENT (OUTPATIENT)
Dept: CARDIAC CATH/INVASIVE PROCEDURES | Age: 58
DRG: 247 | End: 2023-01-28
Payer: COMMERCIAL

## 2023-01-28 LAB
ACTIVATED CLOTTING TIME: 263 SECONDS (ref 1–150)
APTT PPP: 30.9 SECONDS (ref 22–38)
APTT PPP: 40 SECONDS (ref 22–38)
EKG ATRIAL RATE: 67 BPM
EKG P AXIS: 21 DEGREES
EKG P-R INTERVAL: 102 MS
EKG Q-T INTERVAL: 442 MS
EKG QRS DURATION: 106 MS
EKG QTC CALCULATION (BAZETT): 467 MS
EKG R AXIS: 33 DEGREES
EKG T AXIS: 36 DEGREES
EKG VENTRICULAR RATE: 67 BPM

## 2023-01-28 PROCEDURE — 6370000000 HC RX 637 (ALT 250 FOR IP): Performed by: INTERNAL MEDICINE

## 2023-01-28 PROCEDURE — C1874 STENT, COATED/COV W/DEL SYS: HCPCS

## 2023-01-28 PROCEDURE — 85347 COAGULATION TIME ACTIVATED: CPT

## 2023-01-28 PROCEDURE — 6360000004 HC RX CONTRAST MEDICATION: Performed by: INTERNAL MEDICINE

## 2023-01-28 PROCEDURE — 4A023N7 MEASUREMENT OF CARDIAC SAMPLING AND PRESSURE, LEFT HEART, PERCUTANEOUS APPROACH: ICD-10-PCS | Performed by: INTERNAL MEDICINE

## 2023-01-28 PROCEDURE — 2580000003 HC RX 258: Performed by: INTERNAL MEDICINE

## 2023-01-28 PROCEDURE — C1769 GUIDE WIRE: HCPCS

## 2023-01-28 PROCEDURE — 2580000003 HC RX 258: Performed by: PHYSICIAN ASSISTANT

## 2023-01-28 PROCEDURE — 93010 ELECTROCARDIOGRAM REPORT: CPT | Performed by: INTERNAL MEDICINE

## 2023-01-28 PROCEDURE — 1200000000 HC SEMI PRIVATE

## 2023-01-28 PROCEDURE — 92928 PRQ TCAT PLMT NTRAC ST 1 LES: CPT | Performed by: INTERNAL MEDICINE

## 2023-01-28 PROCEDURE — 93458 L HRT ARTERY/VENTRICLE ANGIO: CPT | Performed by: INTERNAL MEDICINE

## 2023-01-28 PROCEDURE — 92928 PRQ TCAT PLMT NTRAC ST 1 LES: CPT

## 2023-01-28 PROCEDURE — B2111ZZ FLUOROSCOPY OF MULTIPLE CORONARY ARTERIES USING LOW OSMOLAR CONTRAST: ICD-10-PCS | Performed by: INTERNAL MEDICINE

## 2023-01-28 PROCEDURE — 85730 THROMBOPLASTIN TIME PARTIAL: CPT

## 2023-01-28 PROCEDURE — B2151ZZ FLUOROSCOPY OF LEFT HEART USING LOW OSMOLAR CONTRAST: ICD-10-PCS | Performed by: INTERNAL MEDICINE

## 2023-01-28 PROCEDURE — G0378 HOSPITAL OBSERVATION PER HR: HCPCS

## 2023-01-28 PROCEDURE — 93005 ELECTROCARDIOGRAM TRACING: CPT | Performed by: INTERNAL MEDICINE

## 2023-01-28 PROCEDURE — 027034Z DILATION OF CORONARY ARTERY, ONE ARTERY WITH DRUG-ELUTING INTRALUMINAL DEVICE, PERCUTANEOUS APPROACH: ICD-10-PCS | Performed by: INTERNAL MEDICINE

## 2023-01-28 PROCEDURE — C1725 CATH, TRANSLUMIN NON-LASER: HCPCS

## 2023-01-28 PROCEDURE — 36415 COLL VENOUS BLD VENIPUNCTURE: CPT

## 2023-01-28 PROCEDURE — 6360000002 HC RX W HCPCS

## 2023-01-28 PROCEDURE — 6370000000 HC RX 637 (ALT 250 FOR IP)

## 2023-01-28 PROCEDURE — C1887 CATHETER, GUIDING: HCPCS

## 2023-01-28 PROCEDURE — C1894 INTRO/SHEATH, NON-LASER: HCPCS

## 2023-01-28 PROCEDURE — 93458 L HRT ARTERY/VENTRICLE ANGIO: CPT

## 2023-01-28 PROCEDURE — 2500000003 HC RX 250 WO HCPCS

## 2023-01-28 RX ORDER — ACETAMINOPHEN 325 MG/1
650 TABLET ORAL EVERY 4 HOURS PRN
Status: DISCONTINUED | OUTPATIENT
Start: 2023-01-28 | End: 2023-01-29 | Stop reason: HOSPADM

## 2023-01-28 RX ORDER — SODIUM CHLORIDE 9 MG/ML
INJECTION, SOLUTION INTRAVENOUS CONTINUOUS
Status: DISCONTINUED | OUTPATIENT
Start: 2023-01-28 | End: 2023-01-29 | Stop reason: HOSPADM

## 2023-01-28 RX ORDER — SODIUM CHLORIDE 0.9 % (FLUSH) 0.9 %
5-40 SYRINGE (ML) INJECTION PRN
Status: DISCONTINUED | OUTPATIENT
Start: 2023-01-28 | End: 2023-01-29 | Stop reason: HOSPADM

## 2023-01-28 RX ORDER — SODIUM CHLORIDE 0.9 % (FLUSH) 0.9 %
5-40 SYRINGE (ML) INJECTION EVERY 12 HOURS SCHEDULED
Status: DISCONTINUED | OUTPATIENT
Start: 2023-01-28 | End: 2023-01-29 | Stop reason: HOSPADM

## 2023-01-28 RX ORDER — SODIUM CHLORIDE 9 MG/ML
INJECTION, SOLUTION INTRAVENOUS PRN
Status: DISCONTINUED | OUTPATIENT
Start: 2023-01-28 | End: 2023-01-29 | Stop reason: HOSPADM

## 2023-01-28 RX ADMIN — SODIUM CHLORIDE, PRESERVATIVE FREE 10 ML: 5 INJECTION INTRAVENOUS at 21:00

## 2023-01-28 RX ADMIN — SODIUM CHLORIDE: 9 INJECTION, SOLUTION INTRAVENOUS at 10:37

## 2023-01-28 RX ADMIN — HEPARIN SODIUM 2000 UNITS: 1000 INJECTION, SOLUTION INTRAVENOUS; SUBCUTANEOUS at 03:13

## 2023-01-28 RX ADMIN — PANTOPRAZOLE SODIUM 40 MG: 40 TABLET, DELAYED RELEASE ORAL at 06:11

## 2023-01-28 RX ADMIN — SODIUM CHLORIDE, PRESERVATIVE FREE 10 ML: 5 INJECTION INTRAVENOUS at 08:19

## 2023-01-28 RX ADMIN — METOPROLOL SUCCINATE 25 MG: 25 TABLET, EXTENDED RELEASE ORAL at 20:59

## 2023-01-28 RX ADMIN — ATORVASTATIN CALCIUM 40 MG: 40 TABLET, FILM COATED ORAL at 08:19

## 2023-01-28 RX ADMIN — PANTOPRAZOLE SODIUM 40 MG: 40 TABLET, DELAYED RELEASE ORAL at 16:39

## 2023-01-28 RX ADMIN — TICAGRELOR 90 MG: 90 TABLET ORAL at 20:59

## 2023-01-28 RX ADMIN — METOPROLOL SUCCINATE 25 MG: 25 TABLET, EXTENDED RELEASE ORAL at 08:20

## 2023-01-28 RX ADMIN — HEPARIN SODIUM 13 UNITS/KG/HR: 10000 INJECTION, SOLUTION INTRAVENOUS at 06:40

## 2023-01-28 RX ADMIN — IOPAMIDOL 100 ML: 755 INJECTION, SOLUTION INTRAVENOUS at 10:29

## 2023-01-28 RX ADMIN — ASPIRIN 81 MG: 81 TABLET ORAL at 08:19

## 2023-01-28 NOTE — PROGRESS NOTES
This RN messaged the on-call provider to ask about pts heparin gtt with surgery scheduled in the am.     1/28/23 12:20 AM  [phone] From: [facility] [address] RE: Maceo Riedel pt is scheduled for a heart cath in the morning at 9am and is currently running a heparin gtt. Is there a time that you would like this on hold or do we just continue it?   Read 12:32 AM     1/28/23 12:32 AM   Keep the drip on please

## 2023-01-28 NOTE — H&P
Jaime Flaherty  Sedation/Analgesia History & Physical    Pt Name: Sachin Taveras  Account number: [de-identified]  MRN: [id number]  YOB: 1965  Provider Performing Procedure: Sue Marsh MD MD  Referring Provider: KANIKA Serrato *   Primary Care Physician: Kaylee Givens DO  Date: 1/28/2023    PRE-PROCEDURE    Code Status: FULL CODE  Brief History/Pre-Procedure Diagnosis:   Unstable angina    Consent: : I have discussed with the patient risks, benefits, and alternatives (and relevant risks, benefits, and side effects related to alternatives or not receiving care), and likelihood of the success. The patient and/or representative appear to understand and agree to proceed. The discussion encompasses risks, benefits, and side effects related to the alternatives and the risks related to not receiving the proposed care, treatment, and services. The indication, risks and benefits of the procedure and possible therapeutic consequences and alternatives were discussed with the patient. The patient was given the opportunity to ask questions and to have them answered to his/her satisfaction. Risks of the procedure include but are not limited to the following: Bleeding, hematoma including retroperitoneal hemmorhage, infection, pain and discomfort, injury to the aorta and other blood vessels, rhythm disturbance, low blood pressure, myocardial infarction, stroke, kidney damage/failure, myocardial perforation, allergic reactions to sedatives/contrast material, loss of pulse/vascular compromise requiring surgery, aneurysm/pseudoaneurysm formation, possible loss of a limb/hand/leg, needing blood transfusion, requiring emergent open heart surgery or emergent coronary intervention, the need for intubation/respiratory support, the requirement for defibrillation/cardioversion, and death. Alternatives to and omission of the suggested procedure were discussed.  The patient had no further questions and wished to proceed; the consent form was signed. MEDICAL HISTORY  []ASHD/ANGINA/MI/CHF   []Hypertension  []Diabetes  []Hyperlipidemia  []Smoking  []Family Hx of ASHD  []Additional information:       has a past medical history of Anxiety attacks, Arthritis, Asthma, Atypical chest pain, Cuevas esophagus, Chronic back pain, Coronary artery disease, Diabetes mellitus, type 2 ([address] Erectile dysfunction, Family history of premature CAD, Gastroesophageal reflux disease, Groin hematoma, Herniated disc, History of erectile dysfunction, History of giardia infection, History of iron deficiency, History of pulmonary embolus (PE), History of smoking, Hyperlipidemia, Hypogonadism, male, Joint pain, Morbid obesity, KIRAN (obstructive sleep apnea), S/P angioplasty, S/P PTCA: 1/12/2015: FFR-guided stenting of distal and mid-LAD using Xience Alpine 2.5X15, post-2.77, and Alpine 2.75X18 mm, post-3.18 mm. , Testosterone deficiency, and Thyroid nodule. SURGICAL HISTORY   has a past surgical history that includes back surgery; Carpal tunnel release (1980); Elbow surgery (1980); knee surgery (1980); Appendectomy; Coronary angioplasty; cardiovascular stress test (3 26 2010); Diagnostic Cardiac Cath Lab Procedure (2 22 2010); lymph node biopsy (2011); Spine surgery (1993, 2000); Upper gastrointestinal endoscopy (2013); Colonoscopy (2013); Shoulder arthroscopy; and US ASP/INJ THYROID CYST (2/23/2021).   Additional information:       ALLERGIES   Allergies as of 01/26/2023    (No Known Allergies)     Additional information:       MEDICATIONS   Aspirin  [] 81 mg  [] 325 mg  [] None  Antiplatelet drug therapy use last 5 days  []No []Yes  Coumadin Use Last 5 Days []No []Yes  Other anticoagulant use last 5 days  []No []Yes    Current Facility-Administered Medications:     heparin (porcine) injection 4,000 Units, 4,000 Units, IntraVENous, PRN, Caitlinda Meckel, DO, 4,000 Units at 01/27/23 1925    heparin (porcine) injection 2,000 Units, 2,000 Units, IntraVENous, PRN, Leontine Hand, DO, 2,000 Units at 01/28/23 0313    heparin 25,000 units in dextrose 5% 250 mL (premix) infusion, 5-30 Units/kg/hr, IntraVENous, Continuous, Leontine Hand, DO, Stopped at 01/28/23 0932    albuterol sulfate HFA (PROVENTIL;VENTOLIN;PROAIR) 108 (90 Base) MCG/ACT inhaler 2 puff, 2 puff, Inhalation, Q4H PRN, JEFF Simpson-C    aspirin EC tablet 81 mg, 81 mg, Oral, Daily, Brooke Luciano PA-C, 81 mg at 01/28/23 0819    atorvastatin (LIPITOR) tablet 40 mg, 40 mg, Oral, Daily, Brooke Luciano PA-C, 40 mg at 01/28/23 0819    metoprolol succinate (TOPROL XL) extended release tablet 25 mg, 25 mg, Oral, BID, JEFF Simpson-C, 25 mg at 01/28/23 0820    pantoprazole (PROTONIX) tablet 40 mg, 40 mg, Oral, BID AC, Brooke Luciano PA-C, 40 mg at 01/28/23 8444    sodium chloride flush 0.9 % injection 5-40 mL, 5-40 mL, IntraVENous, 2 times per day, [facility], 10 mL at 01/28/23 0819    sodium chloride flush 0.9 % injection 5-40 mL, 5-40 mL, IntraVENous, PRN, Brooke Luciano PA-C    0.9 % sodium chloride infusion, , IntraVENous, PRN, Brooke Luciano PA-C    [Held by provider] enoxaparin (LOVENOX) injection 40 mg, 40 mg, SubCUTAneous, BID, Brooke Luciano PA-C, 40 mg at 01/26/23 2040    ondansetron (ZOFRAN-ODT) disintegrating tablet 4 mg, 4 mg, Oral, Q8H PRN **OR** ondansetron (ZOFRAN) injection 4 mg, 4 mg, IntraVENous, Q6H PRN, Brooke Luciano PA-C    polyethylene glycol (GLYCOLAX) packet 17 g, 17 g, Oral, Daily PRN, Brooke Luciano PA-C    acetaminophen (TYLENOL) tablet 650 mg, 650 mg, Oral, Q6H PRN **OR** acetaminophen (TYLENOL) suppository 650 mg, 650 mg, Rectal, Q6H PRN, Brooke Luciano PA-C    potassium chloride (KLOR-CON M) extended release tablet 40 mEq, 40 mEq, Oral, PRN **OR** potassium bicarb-citric acid (EFFER-K) effervescent tablet 40 mEq, 40 mEq, Oral, PRN **OR** potassium chloride 10 mEq/100 mL IVPB (Peripheral Line), 10 mEq, IntraVENous, PRN, Brooke Luciano PA-C    magnesium sulfate 2000 mg in 50 mL IVPB premix, 2,000 mg, IntraVENous, PRN, Pilar Sow PA-C  Prior to Admission medications    Medication Sig Start Date End Date Taking? Authorizing Provider   Semaglutide (RYBELSUS) 7 MG TABS TAKE 1 TABLET BY MOUTH  DAILY 1/10/23   Randi Deluna DO   penicillin v potassium (VEETID) 500 MG tablet Take 4 tablets by mouth once  Patient not taking: No sig reported 7/28/22   Historical Provider, MD   pantoprazole (PROTONIX) 40 MG tablet TAKE 1 TABLET BY MOUTH  TWICE DAILY 8/11/22   Lalo Wong MD   atorvastatin (LIPITOR) 40 MG tablet Take 1 tablet daily  Patient taking differently: Take 1 tablet every other day 7/1/22   Chris Vee MD   metoprolol succinate (TOPROL XL) 25 MG extended release tablet Take 1 tablet twice a day 7/1/22   Yousif Cooper MD   nitroGLYCERIN (NITROSTAT) 0.4 MG SL tablet Place 1 tablet under the tongue every 5 minutes as needed for Chest pain 7/1/22   Chris Vee MD   naproxen (NAPROSYN) 500 MG tablet Take 1 tablet by mouth 2 times daily (with meals)  Patient not taking: Reported on 1/26/2023 6/15/21   Randi Deluna DO   cetirizine (ZYRTEC) 10 MG tablet Take 1 tablet by mouth daily  Patient not taking: Reported on 1/26/2023 5/27/21   Randi Deluna DO   fluticasone Sharda Duck) 50 MCG/ACT nasal spray 2 sprays by Each Nostril route daily  Patient not taking: Reported on 1/26/2023 5/27/21   Randi Deluna DO   albuterol sulfate  (90 Base) MCG/ACT inhaler Inhale 2 puffs into the lungs every 4 hours as needed for Wheezing or Shortness of Breath  Patient not taking: Reported on 1/26/2023 12/30/20   Karry Hamman, APRN - CNP   Multiple Vitamins-Minerals (THERAPEUTIC MULTIVITAMIN-MINERALS) tablet Take 1 tablet by mouth daily    Historical Provider, MD   aspirin 81 MG tablet Take 81 mg by mouth daily.     Historical Provider, MD Cyanocobalamin (VITAMIN B 12 PO) Take 1,000 mg by mouth. Historical Provider, MD     Additional information:       VITAL SIGNS   Vitals:    01/28/23 0351   BP: (!) 150/100   Pulse: 71   Resp: 18   Temp: 98.1 °F (36.7 °C)   SpO2: 95%       PHYSICAL:   General: No acute distress  HEENT:  Unremarkable for age  Neck: without increased JVD, carotid pulses 2+ bilaterally without bruits  Heart: RRR, S1 & S2 WNL. No murmurs    Lungs: Clear to auscultation    Abdomen: BS present, without HSM, masses, or tenderness    Extremities: without C,C,E.  Pulses 2+ bilaterally   Mental Status: Alert & Oriented        PLANNED PROCEDURE   [x]Cath  [x]PCI                []Pacemaker/AICD  []OJ             []Cardioversion []Peripheral angiography/PTA  []Other:      SEDATION  Planned agent:[x]Midazolam []Meperidine []Sublimaze []Morphine  []Diazepam  []Other:     ASA Classification:  []1 []2 [x]3 []4 []5   Class 1: A normal healthy patient  Class 2: Pt with mild to moderate systemic disease  Class 3: Severe systemic disease or disturbance  Class 4: Severe systemic disorders that are already life threatening. Class 5: Moribund pt with little chances of survival, for more than 24 hours. Mallampati I Airway Classification:   []1 []2 [x]3 []4     [x]Pre-procedure diagnostic studies complete and results available. Comment:    [x]Previous sedation/anesthesia experiences assessed. Comment:    [x]The patient is an appropriate candidate to undergo the planned procedure sedation and anesthesia. (Refer to nursing sedation/analgesia documentation record)  [x]Formulation and discussion of sedation/procedure plan, risks, and expectations with patient and/or responsible adult completed. [x]Patient examined immediately prior to the procedure.  (Refer to nursing sedation/analgesia documentation record)      Zaira Ashley MD, Chilango Brock    Electronically signed 1/28/2023 at 9:42 AM

## 2023-01-28 NOTE — BRIEF OP NOTE
[address] [address]  Sedation/Analgesia Post Sedation Record    Pt Name: Jim Davis  Account number: [de-identified]  MRN: [id number]  YOB: 1965  Procedure Performed By: Wilfredo Pittman MD MD   Primary Care Physician: Tatyana Khanna DO  Date: 1/28/2023    POST-PROCEDURE    Physicians/Assistants: Wilfredo Pittman MD MD     Procedure Performed:Cath/ PCI      Sedation/Anesthesia: Versed/ Fentanyl and 2% xylocaine local anesthesia. Estimated Blood Loss: < 50 ml. Specimens Removed: None         Disposition of Specimen: N/A        Complications: No Immediate Complications. Post-procedure Diagnosis/Findings:       Unstable Angina  Severe LAD stenosis, s/p successful PCI/JENNIFER       Recommendations:  Bed rest for 2 hours post procedure   Monitor on Telemetry   Optimize medical therapy for Coronary Artery Disease  Aggressive risk factor modification   Access site care  Antiplatelet therapy: ASA 81 mg PO daily and Brilinta   High intensity statin therapy  IVF: NS at 100 cc/h  AM Labs: BMP, CBC     Above findings and plan of care were discussed with patient and his family, questions were answered, agreeable with plan.        Wilfredo Pittman MD, Zunilda Preston   Electronically signed 1/28/2023 at 10:14 AM  Interventional Cardiology

## 2023-01-28 NOTE — PROCEDURES
[address] [id number]                            CARDIAC CATHETERIZATION    PATIENT NAME: Eva Mccollum                   :        1965  MED REC NO:   [id number]                           ROOM:       0022  ACCOUNT NO:   [de-identified]                           ADMIT DATE: 2023  PROVIDER:     Robb Shen MD    DATE OF PROCEDURE:  2023    INDICATION:  Unstable angina. PROCEDURES PERFORMED:  1. Left cardiac catheterization with selective coronary angiogram.  2.  Left ventriculogram.  3.  Successful PCI and stenting of the left anterior descending artery. DESCRIPTION OF THE PROCEDURE/INTERVENTION DETAILS:  After informed  consent, the patient was brought to the cardiac catheterization room. He was prepped and draped in a sterile fashion. 2% lidocaine was  injected in the skin and subcutaneous tissue overlying the right radial  artery. Under ultrasound guidance using modified Seldinger technique,  access was obtained in the right radial artery. 5/6 Slender sheath was  inserted. Standard antithrombotic/antispasmodic medications were given. I used to 6-[place] JR-4 and 6-[place] EBU 3.5 guide catheter to complete  the coronary angiogram.  Heparin was given. ACT was confirmed to be  above 250. Runthrough wire was used to cross the lesion and wire into  the LAD. The patient was found to have severe denise-stent restenosis in  the mid LAD. I used cutting balloon 2.5 x 10 mm Wellington balloon for  angioplasty. Good expansion was noticed. I then proceeded with  stenting, Larry Blue 3.0 x 18 mm drug-eluting stent was deployed in  overlapping fashion with previous deployed stent. This was postdilated  using a 3.0 x 12 mm noncompliant balloon. Wire was removed. Repeat  angiogram revealed well-expanded stent, EVERARDO-3 flow.   No complications  including no dissection, distal embolization or perforation. FINDINGS:  HEMODYNAMICS:  Left ventricular end-diastolic pressure 24 mmHg. No  significant pressure gradient across the aortic valve upon pullback. LEFT VENTRICULOGRAM:  Mild global hypokinesis. Ejection fraction 45%. CORONARY ANGIOGRAM:  1. Right coronary artery, a small nondominant vessel, patent. 2.  Left main coronary artery, patent, no significant stenosis. 3.  Left circumflex artery, dominant vessel, luminal irregularities,  otherwise patent. 4.  Left anterior descending artery, proximal LAD with mild luminal  irregularities, patent intervention site in proximal to mid LAD, mid LAD  has evidence for severe denise-stent restenosis 80 to 90%, distal LAD with  luminal irregularities. MEDICATIONS:  See MAR. COMPLICATIONS:  None. ESTIMATED BLOOD LOSS:  Less than 50 mL. ACCESS:  Right radial artery access. Vasc Band was applied. Hemostasis  was achieved. IMPRESSION:  1. Unstable angina. 2.  Severe denise-stent restenosis of mid LAD, status post successful PCI  and stenting.         Kunal Garcia MD    D: 01/28/2023 10:20:14       T: 01/28/2023 10:22:37     [email or web address]  Job#: [id number]     Doc#: [id number]    CC:

## 2023-01-28 NOTE — PROGRESS NOTES
Hospitalist Progress Note    Patient:  Israel Yoon      Unit/Bed:6K-26/026-A    YOB: 1965    MRN: [id number]       Acct: [de-identified]     PCP: Timothy Edwards DO    Date of Admission: 1/26/2023    Assessment/Plan:    Acute chest pain/possible unstable angina--ACS ruled out by 3 negative troponins; appreciate cardiology input; on aspirin, statin, beta-blocker; CTA chest did not reveal a PE, no aortic aneurysm or dissection is present; echocardiogram with EF 60 to 65%; planning cardiac catheterization today  Primary hypertension, uncontrolled--on Toprol, monitor  Chronic normocytic anemia--stable  KIRAN--on BiPAP 15/11 per office note dated July 11, 2022, follows with Divya Villatoro  Left lobe of the thyroid gland is enlarged and heterogeneous in attenuation--needs outpatient follow-up  12 mm left adrenal adenoma--needs outpatient follow-up  Known hepatomegaly and hepatic steatosis--outpatient follow-up  25 mm simple cyst in the upper pole of the left kidney  CAD S/P JENNIFER to LAD January 12, 2015--on aspirin, statin, beta-blocker  Morbid obesity with BMI 45.98      Expected discharge date: Pending clinical course    Disposition:    [x] Home       [] TCU       [] Rehab       [] Psych       [] SNF       [] [place]       [] Other-    Chief Complaint: Chest pain    Hospital Course: Per H&P done 1/26/2023: \"Ghanshyam Cardenas is a 59 y.o. male with PMHx of anxiety, CAD, chronic back pain, STEVEN, PE, KIRAN, thyroid nodule who presented to [facility] with chief complaint of chest pain, SOB. Patient reports dyspnea on exertion ongoing for the last few months. He states he is not seen cardiology, as he was waiting until his appointment next month. Patient states that this is new for him, he is now unable to climb a flight of stairs without becoming short of breath. Reports mild lower extremity edema starting over the last month.   He states this is worse after work and resolves with leg elevation. Patient states that he has always slept sitting up, so unclear if any orthopnea. Patient states that today, he was walking into work when he had sudden onset of chest pain that radiated to his back and left arm. Associated with shortness of breath. No lightheadedness, syncope, fever/chills, abdominal pain, N/V/D, urinary symptoms. \"    1/27--> hemodynamically stable however blood pressure is a bit on the higher side prior to medications this morning; echocardiogram ordered, awaiting cardiology input    1/28--> hemodynamically stable, n.p.o. for cardiac catheterization today     Subjective (past 24 hours): Denies any complaints at this time, ready for his heart cath today    Medications:  Reviewed    Infusion Medications    heparin (PORCINE) Infusion 13 Units/kg/hr (01/28/23 0315)    sodium chloride       Scheduled Medications    aspirin  81 mg Oral Daily    atorvastatin  40 mg Oral Daily    metoprolol succinate  25 mg Oral BID    pantoprazole  40 mg Oral BID AC    sodium chloride flush  5-40 mL IntraVENous 2 times per day    [Held by provider] enoxaparin  40 mg SubCUTAneous BID     PRN Meds: heparin (porcine), heparin (porcine), albuterol sulfate HFA, sodium chloride flush, sodium chloride, ondansetron **OR** ondansetron, polyethylene glycol, acetaminophen **OR** acetaminophen, potassium chloride **OR** potassium alternative oral replacement **OR** potassium chloride, magnesium sulfate      Intake/Output Summary (Last 24 hours) at 1/28/2023 0624  Last data filed at 1/28/2023 4801  Gross per 24 hour   Intake 241.84 ml   Output --   Net 241.84 ml       Diet:  Diet NPO    Exam:  BP (!) 150/100   Pulse 71   Temp 98.1 °F (36.7 °C) (Axillary)   Resp 18   Ht 6' (1.829 m)   Wt (!) 344 lb (156 kg)   SpO2 95%   BMI 46.65 kg/m²     General appearance: No apparent distress, appears stated age and cooperative. HEENT: Pupils equal, round, and reactive to light. Conjunctivae/corneas clear.   Neck: Supple, with full range of motion. No jugular venous distention. Trachea midline. Respiratory:  Normal respiratory effort. Clear to auscultation, bilaterally without Rales/Wheezes/Rhonchi. Cardiovascular: Regular rate and rhythm with normal S1/S2 without murmurs, rubs or gallops. Abdomen: Soft, non-tender, non-distended with normal bowel sounds. Obese  Musculoskeletal: passive and active ROM x 4 extremities. Skin: Skin color, texture, turgor normal.    Neurologic:  Neurovascularly intact without any focal sensory/motor deficits. Cranial nerves: II-XII intact, grossly non-focal.  Psychiatric: Alert and oriented, thought content appropriate  Capillary Refill: Brisk,< 3 seconds   Peripheral Pulses: +2 palpable, equal bilaterally       Labs:   Recent Labs     01/26/23  0801 01/27/23  0407 01/27/23  1112   WBC 5.2 5.6 5.3   HGB 11.5* 11.9* 12.3*   HCT 36.7* 38.4* 38.9*    277 279       Recent Labs     01/26/23  0801 01/27/23  0407    140   K 4.1 4.0    107   CO2 21* 23   BUN 14 10   CREATININE 0.6 0.6   CALCIUM 7.9* 8.4*       Microbiology:    AOXZF-12 not detected  Influenza not detected    Radiology:  CTA CHEST W WO CONTRAST    Result Date: 1/26/2023  PROCEDURE: CTA CHEST W WO CONTRAST CLINICAL INFORMATION: Chest pain. Arm pain. Back pain. COMPARISON: CTA chest 1/11/2015. TECHNIQUE: 3 mm axial images were obtained through the aorta after the administration of intravenous contrast.  Pre-contrast axial images were also obtained. 3D reconstructions were performed on the scanner to include sagittal and coronal MIP reconstructions through the aorta. All CT scans at this facility use dose modulation, iterative reconstruction, and/or weight based dosing when appropriate to reduce the radiation dose to as low as reasonably achievable. CONTRAST: 80 cc Isovue-370. FINDINGS: Heart/mediastinum: The left lobe of the thyroid gland is enlarged and heterogeneous in attenuation.  The heart size is normal. Coronary calcifications are observed. No pericardial effusion is identified. The aorta is not dilated. No aortic aneurysm or dissection is present. No mediastinal, hilar, or axillary lymphadenopathy is visualized. Although not tailored for the detection of pulmonary arterial filling defects, no filling defects are noted within the pulmonary arterial vasculature to suggest the presence of pulmonary embolism. Lungs: No focal consolidation, pleural effusion, or pneumothorax is visualized. Dependent atelectasis is noted at the lung bases. No pulmonary mass or nodule is observed. The central airways are patent and unremarkable bilaterally. Upper abdomen: A 12 mm left adrenal adenoma is present. The right adrenal gland is unremarkable. Hepatomegaly and hepatic steatosis are unchanged. A 25 mm simple cyst in the upper pole of the left kidney is incompletely visualized. No acute findings are noted in the limited images through the upper abdomen. A small sliding-type hiatal hernia is stable. Musculoskeletal: Multilevel degenerative disc disease is noted in the thoracic spine. The visualized skeletal structures appear intact. Postoperative changes in the left shoulder suggesting prior rotator cuff repair are partially visualized. 1. The thoracic aorta is normal course and caliber. No aortic aneurysm or dissection is present. No acute intrathoracic process is observed. 2. Numerous chronic findings are discussed. **This report has been created using voice recognition software. It may contain minor errors which are inherent in voice recognition technology. ** Final report electronically signed by Dr Dusty Galeas on 1/26/2023 9:43 AM    XR CHEST PORTABLE    Result Date: 1/26/2023  PROCEDURE: XR CHEST PORTABLE CLINICAL INFORMATION: chest pain, sob COMPARISON: 1/15/2021 TECHNIQUE: AP portable chest radiograph performed. FINDINGS: Platelike subsegmental atelectasis is seen in the right lower lung zone.  No focal pulmonary consolidation. Cardiac silhouette is not enlarged. No pleural effusion. No pneumothorax. No acute bony abnormality. 1. No acute cardiopulmonary finding. 2. Platelike subsegmental atelectasis in the right lower lung zone. **This report has been created using voice recognition software. It may contain minor errors which are inherent in voice recognition technology. ** Final report electronically signed by Dr Wilfredo Varela on 1/26/2023 8:21 AM      DVT prophylaxis: [x] Lovenox                                 [] SCDs                                 [] SQ Heparin                                 [] Encourage ambulation           [] Already on Anticoagulation     Code Status: Full Code    PT/OT Eval Status: Monitor    Tele:   [x] Yes sinus rhythm heart rate 74             [] no    Active Hospital Problems    Diagnosis Date Noted    Unstable angina ([facility] [address] [I20.0] 01/27/2023     Priority: High    Chest pain [R07.9] 01/26/2023     Priority: Medium       Electronically signed by KANIKA Rodriguez CNP on 1/28/2023 at 6:24 AM

## 2023-01-28 NOTE — PLAN OF CARE
Problem: Discharge Planning  Goal: Discharge to home or other facility with appropriate resources  1/28/2023 0214 by Jill Styles RN  Outcome: Progressing  Flowsheets (Taken 1/27/2023 2020)  Discharge to home or other facility with appropriate resources: Identify barriers to discharge with patient and caregiver  1/27/2023 1413 by Robel Plata RN  Outcome: Progressing  Flowsheets (Taken 1/27/2023 1413)  Discharge to home or other facility with appropriate resources: Identify barriers to discharge with patient and caregiver     Problem: Chronic Conditions and Co-morbidities  Goal: Patient's chronic conditions and co-morbidity symptoms are monitored and maintained or improved  1/28/2023 0214 by Jill Styles RN  Outcome: Progressing  Flowsheets (Taken 1/27/2023 2020)  Care Plan - Patient's Chronic Conditions and Co-Morbidity Symptoms are Monitored and Maintained or Improved: Monitor and assess patient's chronic conditions and comorbid symptoms for stability, deterioration, or improvement  1/27/2023 1413 by Robel Plata RN  Outcome: Progressing  [address] (Taken 1/27/2023 1413)  Care Plan - Patient's Chronic Conditions and Co-Morbidity Symptoms are Monitored and Maintained or Improved: Monitor and assess patient's chronic conditions and comorbid symptoms for stability, deterioration, or improvement     Problem: Pain  Goal: Verbalizes/displays adequate comfort level or baseline comfort level  1/28/2023 0214 by Jill Styles RN  Outcome: Progressing  Flowsheets (Taken 1/27/2023 1928)  Verbalizes/displays adequate comfort level or baseline comfort level: Encourage patient to monitor pain and request assistance  1/27/2023 1413 by Robel Plata RN  Outcome: Progressing   Careplan reviewed with pt.  Pt verbalized understanding of careplan and is in agreeable to care plan

## 2023-01-29 VITALS
HEIGHT: 72 IN | DIASTOLIC BLOOD PRESSURE: 66 MMHG | BODY MASS INDEX: 42.66 KG/M2 | TEMPERATURE: 98.1 F | RESPIRATION RATE: 18 BRPM | HEART RATE: 74 BPM | OXYGEN SATURATION: 97 % | WEIGHT: 315 LBS | SYSTOLIC BLOOD PRESSURE: 135 MMHG

## 2023-01-29 LAB
ANION GAP SERPL CALC-SCNC: 11 MEQ/L (ref 8–16)
BUN SERPL-MCNC: 8 MG/DL (ref 7–22)
CALCIUM SERPL-MCNC: 8.3 MG/DL (ref 8.5–10.5)
CHLORIDE SERPL-SCNC: 109 MEQ/L (ref 98–111)
CO2 SERPL-SCNC: 22 MEQ/L (ref 23–33)
CREAT SERPL-MCNC: 0.7 MG/DL (ref 0.4–1.2)
GFR SERPL CREATININE-BSD FRML MDRD: > 60 ML/MIN/1.73M2
GLUCOSE SERPL-MCNC: 133 MG/DL (ref 70–108)
POTASSIUM SERPL-SCNC: 3.9 MEQ/L (ref 3.5–5.2)
SODIUM SERPL-SCNC: 142 MEQ/L (ref 135–145)

## 2023-01-29 PROCEDURE — 2580000003 HC RX 258: Performed by: INTERNAL MEDICINE

## 2023-01-29 PROCEDURE — 2580000003 HC RX 258: Performed by: PHYSICIAN ASSISTANT

## 2023-01-29 PROCEDURE — 6370000000 HC RX 637 (ALT 250 FOR IP): Performed by: INTERNAL MEDICINE

## 2023-01-29 PROCEDURE — 36415 COLL VENOUS BLD VENIPUNCTURE: CPT

## 2023-01-29 PROCEDURE — 6370000000 HC RX 637 (ALT 250 FOR IP): Performed by: NURSE PRACTITIONER

## 2023-01-29 PROCEDURE — 99239 HOSP IP/OBS DSCHRG MGMT >30: CPT | Performed by: NURSE PRACTITIONER

## 2023-01-29 PROCEDURE — 80048 BASIC METABOLIC PNL TOTAL CA: CPT

## 2023-01-29 RX ORDER — LISINOPRIL 5 MG/1
5 TABLET ORAL DAILY
Status: DISCONTINUED | OUTPATIENT
Start: 2023-01-29 | End: 2023-01-29 | Stop reason: HOSPADM

## 2023-01-29 RX ORDER — NITROGLYCERIN 0.4 MG/1
0.4 TABLET SUBLINGUAL EVERY 5 MIN PRN
Qty: 25 TABLET | Refills: 3 | Status: SHIPPED | OUTPATIENT
Start: 2023-01-29

## 2023-01-29 RX ORDER — LISINOPRIL 5 MG/1
5 TABLET ORAL DAILY
Qty: 30 TABLET | Refills: 3 | Status: SHIPPED | OUTPATIENT
Start: 2023-01-29

## 2023-01-29 RX ORDER — ATORVASTATIN CALCIUM 40 MG/1
40 TABLET, FILM COATED ORAL DAILY
Qty: 30 TABLET | Refills: 3 | Status: SHIPPED | OUTPATIENT
Start: 2023-01-30

## 2023-01-29 RX ADMIN — SODIUM CHLORIDE, PRESERVATIVE FREE 10 ML: 5 INJECTION INTRAVENOUS at 09:00

## 2023-01-29 RX ADMIN — TICAGRELOR 90 MG: 90 TABLET ORAL at 09:00

## 2023-01-29 RX ADMIN — ATORVASTATIN CALCIUM 40 MG: 40 TABLET, FILM COATED ORAL at 09:00

## 2023-01-29 RX ADMIN — PANTOPRAZOLE SODIUM 40 MG: 40 TABLET, DELAYED RELEASE ORAL at 09:00

## 2023-01-29 RX ADMIN — LISINOPRIL 5 MG: 5 TABLET ORAL at 13:21

## 2023-01-29 RX ADMIN — ASPIRIN 81 MG: 81 TABLET ORAL at 09:00

## 2023-01-29 RX ADMIN — METOPROLOL SUCCINATE 25 MG: 25 TABLET, EXTENDED RELEASE ORAL at 09:00

## 2023-01-29 RX ADMIN — SODIUM CHLORIDE, PRESERVATIVE FREE 10 ML: 5 INJECTION INTRAVENOUS at 09:02

## 2023-01-29 NOTE — DISCHARGE SUMMARY
Hospital Medicine Discharge Summary      Patient Identification:   Caleb Morse   : 1965  MRN: [id number]   Account: [de-identified]      Patient's PCP: Jasmin Chavez DO    Admit Date: 2023     Discharge Date:   2023    Admitting Physician: No admitting provider for patient encounter. Discharging Nurse Practitioner: Paula Harper APRN - CNP     Discharge Diagnoses with Assessment/Plan:  Unstable angina S/P PCI/JENNIFER to LAD on 2023 --ACS ruled out by 3 negative troponins; appreciate cardiology input; on aspirin, statin, beta-blocker, Brilinta; CTA chest did not reveal a PE, no aortic aneurysm or dissection is present; echocardiogram with EF 60 to 65%  Primary hypertension, uncontrolled--on Toprol  Chronic normocytic anemia--stable  KIRAN--on BiPAP 15/11 per office note dated 2022, follows with Lili Coleman  Left lobe of the thyroid gland is enlarged and heterogeneous in attenuation--needs outpatient follow-up  12 mm left adrenal adenoma--needs outpatient follow-up  Known hepatomegaly and hepatic steatosis--outpatient follow-up  25 mm simple cyst in the upper pole of the left kidney  CAD S/P JENNIFER to LAD 2015--on aspirin, statin, beta-blocker  Morbid obesity with BMI 46.56--encouraged weight loss strategies; may benefit from bariatric evaluation     The patient was seen and examined on day of discharge and this discharge summary is in conjunction with any daily progress note from day of discharge. Hospital Course:   Caleb Morse is a 59 y.o. male admitted to [address] [place] [address] on 2023 for chest pain; Per H&P done 2023: Tyra Felty is a 59 y.o. male with PMHx of anxiety, CAD, chronic back pain, STEVEN, PE, KIRAN, thyroid nodule who presented to [facility] with chief complaint of chest pain, SOB. Patient reports dyspnea on exertion ongoing for the last few months.   He states he is not seen cardiology, as he was waiting until his appointment next month. Patient states that this is new for him, he is now unable to climb a flight of stairs without becoming short of breath. Reports mild lower extremity edema starting over the last month. He states this is worse after work and resolves with leg elevation. Patient states that he has always slept sitting up, so unclear if any orthopnea. Patient states that today, he was walking into work when he had sudden onset of chest pain that radiated to his back and left arm. Associated with shortness of breath. No lightheadedness, syncope, fever/chills, abdominal pain, N/V/D, urinary symptoms. \"     1/27--> hemodynamically stable however blood pressure is a bit on the higher side prior to medications this morning; echocardiogram ordered, awaiting cardiology input     1/28--> hemodynamically stable, n.p.o. for cardiac catheterization today     1/29-->hemodynamically stable, eating well, states he feels much better, we discussed the importance of diet modification/weight reduction; cardiology is following at this time stable for discharge home so will be discharged home in stable condition with outpatient follow-up. Exam:     Vitals:  Vitals:    01/28/23 2045 01/28/23 2330 01/29/23 0308 01/29/23 0856   BP: 136/62 (!) 143/66 139/80 135/66   Pulse: 73 72 69 74   Resp:  18 18 18   Temp: 97.7 °F (36.5 °C) 98.5 °F (36.9 °C) 97.9 °F (36.6 °C) 98.1 °F (36.7 °C)   TempSrc: Oral Oral Oral Oral   SpO2: 97% 97% 97% 97%   Weight:   (!) 343 lb 4.8 oz (155.7 kg)    Height:         Weight: Weight: (!) 343 lb 4.8 oz (155.7 kg)     24 hour intake/output:No intake or output data in the 24 hours ending 01/29/23 1151      General appearance:  No apparent distress, appears stated age and cooperative. HEENT:  Normal cephalic, atraumatic without obvious deformity. Pupils equal, round, and reactive to light. Conjunctivae/corneas clear. Neck: Supple, with full range of motion. No jugular venous distention.  Trachea midline. Respiratory:  Normal respiratory effort. Clear to auscultation, bilaterally without Rales/Wheezes/Rhonchi. Cardiovascular:  Regular rate and rhythm with normal S1/S2 without murmurs, rubs or gallops. Abdomen: Soft, non-tender, non-distended with normal bowel sounds. Obese  Musculoskeletal:  No clubbing, cyanosis or edema bilaterally. Full range of motion without deformity. Skin: Skin color, texture, turgor normal.    Neurologic:  Neurovascularly intact without any focal sensory/motor deficits. Cranial nerves: II-XII intact, grossly non-focal.  Psychiatric:  Alert and oriented, thought content appropriate  Capillary Refill: Brisk,< 3 seconds   Peripheral Pulses: +2 palpable, equal bilaterally       Labs: For convenience and continuity at follow-up the following most recent labs are provided:      CBC:    Lab Results   Component Value Date/Time    WBC 5.3 01/27/2023 11:12 AM    HGB 12.3 01/27/2023 11:12 AM    HCT 38.9 01/27/2023 11:12 AM     01/27/2023 11:12 AM       Renal:    Lab Results   Component Value Date/Time     01/29/2023 03:58 AM    K 3.9 01/29/2023 03:58 AM     01/29/2023 03:58 AM    CO2 22 01/29/2023 03:58 AM    BUN 8 01/29/2023 03:58 AM    CREATININE 0.7 01/29/2023 03:58 AM    CALCIUM 8.3 01/29/2023 03:58 AM       Cardiac:   Recent Labs     01/26/23  1459   TROPONINT < 0.010       Significant Diagnostic Studies    Radiology:   CTA CHEST W WO CONTRAST   Final Result   1. The thoracic aorta is normal course and caliber. No aortic aneurysm or dissection is present. No acute intrathoracic process is observed. 2. Numerous chronic findings are discussed. **This report has been created using voice recognition software. It may contain minor errors which are inherent in voice recognition technology. **      Final report electronically signed by Dr Lorena Brian on 1/26/2023 9:43 AM      XR CHEST PORTABLE   Final Result   1. No acute cardiopulmonary finding.    2. Platelike subsegmental atelectasis in the right lower lung zone. **This report has been created using voice recognition software. It may contain minor errors which are inherent in voice recognition technology. **      Final report electronically signed by Dr Wilfredo Varela on 1/26/2023 8:21 AM             Consults:     IP CONSULT TO CASE MANAGEMENT  IP CONSULT TO CARDIAC REHAB  IP CONSULT TO CARDIAC REHAB    Disposition:    [x] Home       [] TCU       [] Rehab       [] Psych       [] SNF       [] [place]       [] Other-    Condition at Discharge: Stable    Code Status:  Full Code     Pending tests at discharge:    none      Patient Instructions:    Discharge lab work: none  Activity: activity as tolerated  Diet: ADULT DIET; Regular      Follow-up visits:   No follow-up provider specified.        Discharge Medications:        Medication List        START taking these medications      ticagrelor 90 MG Tabs tablet  Commonly known as: BRILINTA  Take 1 tablet by mouth 2 times daily            CHANGE how you take these medications      atorvastatin 40 MG tablet  Commonly known as: LIPITOR  Take 1 tablet by mouth daily  Start taking on: January 30, 2023  What changed:   how much to take  how to take this  when to take this  additional instructions            CONTINUE taking these medications      aspirin 81 MG tablet     metoprolol succinate 25 MG extended release tablet  Commonly known as: TOPROL XL  Take 1 tablet twice a day     nitroGLYCERIN 0.4 MG SL tablet  Commonly known as: Nitrostat  Place 1 tablet under the tongue every 5 minutes as needed for Chest pain     pantoprazole 40 MG tablet  Commonly known as: PROTONIX  TAKE 1 TABLET BY MOUTH  TWICE DAILY     Rybelsus 7 MG Tabs  Generic drug: Semaglutide  TAKE 1 TABLET BY MOUTH  DAILY     therapeutic multivitamin-minerals tablet            STOP taking these medications      albuterol sulfate  (90 Base) MCG/ACT inhaler  Commonly known as: PROVENTIL;VENTOLIN;PROAIR     cetirizine 10 MG tablet  Commonly known as: ZYRTEC     fluticasone 50 MCG/ACT nasal spray  Commonly known as: FLONASE     naproxen 500 MG tablet  Commonly known as: NAPROSYN     penicillin v potassium 500 MG tablet  Commonly known as: VEETID     VITAMIN B 12 PO               Where to Get Your Medications        These medications were sent to [address] [place] [address], [address] [place] [address], [address] [place] [address]      Phone: [phone]   atorvastatin 40 MG tablet  nitroGLYCERIN 0.4 MG SL tablet  ticagrelor 90 MG Tabs tablet         Time Spent on discharge is more than 45 minutes in the examination, evaluation, counseling and review of medications and discharge plan. Signed: Thank you Alray Gilford, DO for the opportunity to be involved in this patient's care.     Electronically signed by KANIKA Martinez CNP on 1/29/2023 at 11:51 AM

## 2023-01-29 NOTE — PLAN OF CARE
Problem: Discharge Planning  Goal: Discharge to home or other facility with appropriate resources  Outcome: Progressing  Flowsheets (Taken 1/28/2023 2255)  Discharge to home or other facility with appropriate resources:   Identify barriers to discharge with patient and caregiver   Arrange for needed discharge resources and transportation as appropriate     Problem: Chronic Conditions and Co-morbidities  Goal: Patient's chronic conditions and co-morbidity symptoms are monitored and maintained or improved  Outcome: Progressing  Flowsheets (Taken 1/28/2023 2255)  Care Plan - Patient's Chronic Conditions and Co-Morbidity Symptoms are Monitored and Maintained or Improved: Monitor and assess patient's chronic conditions and comorbid symptoms for stability, deterioration, or improvement     Problem: Pain  Goal: Verbalizes/displays adequate comfort level or baseline comfort level  Outcome: Progressing  Flowsheets (Taken 1/28/2023 2255)  Verbalizes/displays adequate comfort level or baseline comfort level: Encourage patient to monitor pain and request assistance    Care plan reviewed with patient. Patient verbalizes understanding of the care plan and contributed to goal setting.

## 2023-01-29 NOTE — PROGRESS NOTES
Cardiology Progress Note      Patient:  Edmar Plunkett  YOB: 1965  MRN: [id number]   Acct: [de-identified]  Admit Date:  1/26/2023  Primary Cardiologist: Laxmi Garcia MD  Seen byDr. Kristen Ramos     Per prior cardiology consult note-  CHIEF COMPLAINT: Chest pain        HPI: This is a pleasant 59 y.o. male with PMH of CAD s/p PCI, HTN, KIRAN on CPAP, GERD, HLD who presents with chest pain with associated SOB. Patient states that the pain began while he was at work and was substernal. He also reports associated pain in between his shoulder blades. Patient states that the chest pain was not relieved with rest. Patient took one dose of SL nitroglycerin at work which did not relieve his chest pain. Patient states that this pain is similar to the pain he felt in the past when he required stents. Patient describes the chest pain as a pressure and discomfort. Patient denies any current chest pain. He states that his chest pain was relieved during his stay in the ED. Patient also reports worsening lower extremity swelling and increased SOB over the last few months. He states that this has never happened previously. Patient reports compliance to his home mediations.        Subjective (Events in last 24 hours):   Pt sp cardiac cath yesterday with PCI     No chest pain or SOB today     VSS  Tele SR no ectopy   Rt radial cath site - no ecchymosis or hematoma noted - pulses present - neurovascular check wnl     Cath restrictions discussed     Objective:   /66   Pulse 74   Temp 98.1 °F (36.7 °C) (Oral)   Resp 18   Ht 6' (1.829 m)   Wt (!) 343 lb 4.8 oz (155.7 kg)   SpO2 97%   BMI 46.56 kg/m²        TELEMETRY: SR no ectopy     Physical Exam:  General Appearance: alert and oriented to person, place and time, in no acute distress  Cardiovascular: normal rate, regular rhythm, normal S1 and S2, no murmurs, rubs, clicks, or gallops, distal pulses intact  Pulmonary/Chest: clear to auscultation bilaterally- no wheezes, rales or rhonchi, normal air movement, no respiratory distress  Abdomen: soft, non-tender, non-distended, normal bowel sounds, no masses Extremities: no cyanosis, clubbing or edema, pulses present    Musculoskeletal: normal range of motion, no joint swelling, deformity or tenderness  Neurological: alert, oriented, normal speech, no focal findings or movement disorder noted    Medications:    sodium chloride flush  5-40 mL IntraVENous 2 times per day    ticagrelor  90 mg Oral BID    aspirin  81 mg Oral Daily    atorvastatin  40 mg Oral Daily    metoprolol succinate  25 mg Oral BID    pantoprazole  40 mg Oral BID AC    sodium chloride flush  5-40 mL IntraVENous 2 times per day    [Held by provider] enoxaparin  40 mg SubCUTAneous BID      sodium chloride      sodium chloride 100 mL/hr at 01/28/23 1037    sodium chloride       sodium chloride flush, 5-40 mL, PRN  sodium chloride, , PRN  acetaminophen, 650 mg, Q4H PRN  albuterol sulfate HFA, 2 puff, Q4H PRN  sodium chloride flush, 5-40 mL, PRN  sodium chloride, , PRN  ondansetron, 4 mg, Q8H PRN   Or  ondansetron, 4 mg, Q6H PRN  polyethylene glycol, 17 g, Daily PRN  potassium chloride, 40 mEq, PRN   Or  potassium alternative oral replacement, 40 mEq, PRN   Or  potassium chloride, 10 mEq, PRN  magnesium sulfate, 2,000 mg, PRN        Diagnostics:    Echo:   Electronically signed by Aissatou Bettencourt MD (Interpreting   physician) on 01/27/2023 at 03:06 PM   ----------------------------------------------------------------      Findings      Mitral Valve   The mitral valve structure was normal with normal leaflet separation. DOPPLER: The transmitral velocity was within the normal range with no   evidence for mitral stenosis. There was no evidence of mitral   regurgitation. Aortic Valve   The aortic valve was trileaflet with normal thickness and cuspal   separation. DOPPLER: Transaortic velocity was within the normal range with   no evidence of aortic stenosis.  There was no evidence of aortic   regurgitation. Tricuspid Valve   The tricuspid valve structure was normal with normal leaflet separation. DOPPLER: There was no evidence of tricuspid stenosis. There was no   evidence of tricuspid regurgitation. Pulmonic Valve   The pulmonic valve leaflets were not well seen. DOPPLER: The transpulmonic   velocity was within the normal range with no evidence for regurgitation. Left Atrium   Left atrial size was normal.      Left Ventricle   Normal left ventricular size and systolic function. There were no regional wall motion abnormalities. Wall thickness was within normal limits. Ejection fraction was estimated at 60-65%. Right Atrium   Right atrial size was normal.      Right Ventricle   The right ventricular size was normal with normal systolic function and   wall thickness. Pericardial Effusion   The pericardium was normal in appearance without pericardial effusion. Prominent pericardial fat pad. Pleural Effusion   No evidence of pleural effusion. Aorta / Great Vessels   IVC size is within normal limits with normal respiratory phasic changes      Left Heart Cath:   FINDINGS:  HEMODYNAMICS:  Left ventricular end-diastolic pressure 24 mmHg. No  significant pressure gradient across the aortic valve upon pullback. LEFT VENTRICULOGRAM:  Mild global hypokinesis. Ejection fraction 45%. CORONARY ANGIOGRAM:  1. Right coronary artery, a small nondominant vessel, patent. 2.  Left main coronary artery, patent, no significant stenosis. 3.  Left circumflex artery, dominant vessel, luminal irregularities,  otherwise patent. 4.  Left anterior descending artery, proximal LAD with mild luminal  irregularities, patent intervention site in proximal to mid LAD, mid LAD  has evidence for severe denise-stent restenosis 80 to 90%, distal LAD with  luminal irregularities. IMPRESSION:  1. Unstable angina.   2.  Severe denise-stent restenosis of mid LAD, status post successful PCI  and stenting. Dewey Partida MD     D: 01/28/2023     Lab Data:    Cardiac Enzymes:  No results for input(s): CKTOTAL, CKMB, CKMBINDEX, TROPONINI in the last 72 hours.     CBC:   Lab Results   Component Value Date/Time    WBC 5.3 01/27/2023 11:12 AM    RBC 4.74 01/27/2023 11:12 AM    RBC 4.76 07/10/2011 08:24 AM    HGB 12.3 01/27/2023 11:12 AM    HCT 38.9 01/27/2023 11:12 AM     01/27/2023 11:12 AM       CMP:    Lab Results   Component Value Date/Time     01/29/2023 03:58 AM    K 3.9 01/29/2023 03:58 AM     01/29/2023 03:58 AM    CO2 22 01/29/2023 03:58 AM    BUN 8 01/29/2023 03:58 AM    CREATININE 0.7 01/29/2023 03:58 AM    LABGLOM >60 01/29/2023 03:58 AM    GLUCOSE 133 01/29/2023 03:58 AM    GLUCOSE 93 02/11/2012 11:47 AM    CALCIUM 8.3 01/29/2023 03:58 AM       Hepatic Function Panel:    Lab Results   Component Value Date/Time    ALKPHOS 99 02/11/2022 11:32 AM    ALT 29 02/11/2022 11:32 AM    AST 23 02/11/2022 11:32 AM    PROT 6.6 02/11/2022 11:32 AM    BILITOT 0.4 02/11/2022 11:32 AM    BILIDIR <0.2 09/02/2017 11:14 AM    LABALBU 4.1 02/11/2022 11:32 AM    LABALBU 3.8 02/11/2012 11:47 AM       Magnesium:  No results found for: MG    PT/INR:    Lab Results   Component Value Date/Time    INR 1.04 01/27/2023 11:12 AM       HgBA1c:    Lab Results   Component Value Date/Time    LABA1C 6.1 08/30/2022 09:37 AM       FLP:    Lab Results   Component Value Date/Time    TRIG 94 02/11/2022 11:32 AM    HDL 44 02/11/2022 11:32 AM    LDLCALC 46 02/11/2022 11:32 AM       TSH:    Lab Results   Component Value Date/Time    TSH 1.340 02/11/2022 11:32 AM         Assessment:    Deisy Whitlock cardiac cath 1/28/23: Severe denise-stent restenosis of mid LAD, status post successful PCI  and stenting.  - preserved EF     Prior Hx CAD   HLP   LDL 46 2/11/22  HTN  stable     Morbid obesity     Hx KIRAN         Plan:  Ok for DC today   Add ACE   Follow with Erum Steele... 2 weeks sp PCI       Patient and provider goals: Feel better and have more energy, Begin Cardiac Rehab, and Start exercise program       Cardiac Rehab: Yes      Discharge Medications for PCI/MI (performed or attempted):   ASA: yes  Statin: yes  P2Y12 Inhibitor: yes  Beta Blocker: yes  ACE / ARB: yes  Nitro SL: yes      Discharge Medications for ICD, Cardiomyopathy, CHF:  Beta Blocker: yes  ACE Inhibitor/ARB: yes   normal EF                Electronically signed by KANIKA De La Torre CNP on 1/29/2023 at 12:25 PM

## 2023-01-29 NOTE — PLAN OF CARE
Problem: Discharge Planning  Goal: Discharge to home or other facility with appropriate resources  1/29/2023 1111 by Claudia Riddle RN  Outcome: Progressing  Flowsheets (Taken 1/29/2023 4384)  Discharge to home or other facility with appropriate resources:   Identify barriers to discharge with patient and caregiver   Arrange for needed discharge resources and transportation as appropriate   Identify discharge learning needs (meds, wound care, etc)   Refer to discharge planning if patient needs post-hospital services based on physician order or complex needs related to functional status, cognitive ability or social support system  1/28/2023 2255 by Italo Davalos RN  Outcome: Progressing  Flowsheets (Taken 1/28/2023 2255)  Discharge to home or other facility with appropriate resources:   Identify barriers to discharge with patient and caregiver   Arrange for needed discharge resources and transportation as appropriate     Problem: Chronic Conditions and Co-morbidities  Goal: Patient's chronic conditions and co-morbidity symptoms are monitored and maintained or improved  1/29/2023 1111 by Claudia Riddle RN  Outcome: Progressing  1/29/2023 1109 by Claudia Riddle RN  Flowsheets (Taken 1/29/2023 5898)  Care Plan - Patient's Chronic Conditions and Co-Morbidity Symptoms are Monitored and Maintained or Improved:   Monitor and assess patient's chronic conditions and comorbid symptoms for stability, deterioration, or improvement   Collaborate with multidisciplinary team to address chronic and comorbid conditions and prevent exacerbation or deterioration   Update acute care plan with appropriate goals if chronic or comorbid symptoms are exacerbated and prevent overall improvement and discharge  1/28/2023 2255 by Italo Davalos RN  Outcome: Progressing  Flowsheets (Taken 1/28/2023 2255)  Care Plan - Patient's Chronic Conditions and Co-Morbidity Symptoms are Monitored and Maintained or Improved: Monitor and assess patient's chronic conditions and comorbid symptoms for stability, deterioration, or improvement     Problem: Pain  Goal: Verbalizes/displays adequate comfort level or baseline comfort level  1/29/2023 1111 by Xavi Funez RN  Outcome: Progressing  1/29/2023 1109 by Xavi Funez RN  [address] (Taken 1/28/2023 2255 by Trevon Matta RN)  Verbalizes/displays adequate comfort level or baseline comfort level: Encourage patient to monitor pain and request assistance  1/28/2023 2255 by Trevon Matta RN  Outcome: Progressing  Flowsheets (Taken 1/28/2023 2255)  Verbalizes/displays adequate comfort level or baseline comfort level: Encourage patient to monitor pain and request assistance     Problem: Cardiovascular - Adult  Goal: Maintains optimal cardiac output and hemodynamic stability  Outcome: Progressing  Goal: Absence of cardiac dysrhythmias or at baseline  Outcome: Progressing  Goal: Maintain normal heart rhythm  Outcome: Progressing  Goal: Cardiovascular alteration will improve  Description: Cardiovascular alteration will improve  Outcome: Progressing  Goal: Electrolytes maintained within normal limits  Description: INTERVENTIONS:  Outcome: Progressing   Care plan reviewed with patient. Patient verbalizes understanding of the care plan and contributed to goal setting.

## 2023-01-29 NOTE — DISCHARGE INSTRUCTIONS
Follow with Dr. Wilfredo Dumont... 2 weeks sp PCI         Discharge Instructions for Radial Heart Catherization    1. Take it easy for 3-4 days. 2.  No driving for 2 days. 3.  No lifting of 5 lbs or more for 5 days with the affected arm. 4.  Can shower after 24 hours. 5.  Remove arm board after 24 hours. 6.  Apply a band aid to the insertion site daily for 5 days. Wash site daily with soap and water. 7.  No creams, ointments, or powders near the insertion site. 8.  No tub baths, swimming, hot tubs, or hand washing dishes for 1 week. 9.  Watch for signs of infection (redness, warmth, swelling, or pus drainage) or coolness of extremity and call physician if this occurs  10. If bleeding occurs from insertion site, apply pressure and call 911.      Watch for numbness/tingling - if this occurs go to ER ASAP  Apply ice 15min with hour break in between and alternate with heat compress for 15 min to reduce swelling for 3-5 days

## 2023-01-30 ENCOUNTER — TELEPHONE (OUTPATIENT)
Dept: FAMILY MEDICINE CLINIC | Age: 58
End: 2023-01-30

## 2023-01-30 ENCOUNTER — CARE COORDINATION (OUTPATIENT)
Dept: CASE MANAGEMENT | Age: 58
End: 2023-01-30

## 2023-01-30 DIAGNOSIS — R07.9 CHEST PAIN, UNSPECIFIED TYPE: Primary | ICD-10-CM

## 2023-01-30 NOTE — PROGRESS NOTES
Inpatient Cardiac Rehabilitation Consult    Received consult for Phase II Cardiac Rehabilitation. Patient needs cardiac rehab due to PCI on 1/28/23. Will call patient at home to discuss importance of cardiac rehab.

## 2023-01-30 NOTE — CARE COORDINATION
[facility] Care Transitions Initial Follow Up Call    Call within 2 business days of discharge: Yes    Care Transition Nurse contacted the patient by telephone to perform post hospital discharge assessment. Verified name and  with patient as identifiers. Provided introduction to self, and explanation of the Care Transition Nurse role. Patient: Jim [place] Patient : 1965   MRN: [id number]  Reason for Admission: chest pain  Discharge Date: 23 RARS: Readmission Risk Score: 7      Last Discharge  Street       Date Complaint Diagnosis Description Type Department Provider    23 Chest Pain Chest pain, unspecified type . .. ED to Hosp-Admission (Discharged) (ADMITTED) KANIKA Doe - CNP; Al. .. Was this an external facility discharge? No Discharge Facility:     Challenges to be reviewed by the provider   Additional needs identified to be addressed with provider: No  none               Method of communication with provider: none. CTN call to [facility] today and he says he is feeling good. Denies chest pain, sob, n/vd, dizziness, fever, chills. LE edema almost gone. C/o a little DYSPNEA ON EXERTION-> attributes to starting Brilinta. Meds reviewed and are correct. R radial HC site looks good-> no swelling, redness. Has post cath instructions on AVS pg. 11-> no questions. Eating, drinking & sleeping ok. Denies problems w/ urination /bowels. PCP HFU 23  Cardio HFU 2/15/23  Referral to cardiac rehab completed-> they will reach out to him at the appropriate time. Has BP monitor at home. Will start taking BP daily at the same time & record. BP log to be mailed. No other concerns voiced at this time. CTN # given to pt. CHARI [place] Care Transitions [phone]        Care Transition Nurse reviewed discharge instructions, medical action plan, and red flags with patient who verbalized understanding.  The patient was given an opportunity to ask questions and does not have any further questions or concerns at this time. Were discharge instructions available to patient? Yes. Reviewed appropriate site of care based on symptoms and resources available to patient including: PCP  Specialist  When to call 911. The patient agrees to contact the PCP office for questions related to their healthcare. Advance Care Planning:   Does patient have an Advance Directive: not on file. Medication reconciliation was performed with patient, who verbalizes understanding of administration of home medications.  Medications reviewed, 1111F entered: yes    Was patient discharged with a pulse oximeter? no    Non-face-to-face services provided:  Scheduled appointment with PCP-2/6/23  Scheduled appointment with Specialist-2/15/23 cardio  Obtained and reviewed discharge summary and/or continuity of care documents    Offered patient enrollment in the Remote Patient Monitoring (RPM) program for in-home monitoring: Patient is not eligible for RPM program.    Care Transitions 24 Hour Call    Schedule Follow Up Appointment with PCP: Completed  Do you have a copy of your discharge instructions?: Yes  Do you have all of your prescriptions and are they filled?: Yes  Have you been contacted by a [facility] Pharmacist?: No  Have you scheduled your follow up appointment?: Yes  How are you going to get to your appointment?: Car - drive self  Do you feel like you have everything you need to keep you well at home?: Yes  Care Transitions Interventions     Transportation Support: Declined   Disease Specific Clinic: Completed      Disease Association: Completed               Follow Up  Future Appointments   Date Time Provider Kiana Barrera   2/6/2023  8:45 AM Romeo Valdivia DO [address] [facility]   2/15/2023  1:30 PM Jaxon Cherry PA-C [address] [facility]   7/7/2023  8:45 AM Jailyn Quiñones   7/12/2023  2:00 PM [address], [address] Transition Nurse provided contact information. Plan for follow-up call in 5-7 days based on severity of symptoms and risk factors.   Plan for next call: symptom management-chest pain, sob, palmer?, LE swelling?, BP?  follow-up appointment-PCP 2/6/23-> review    Marika Sams RN

## 2023-01-30 NOTE — TELEPHONE ENCOUNTER
Care Transitions Initial Follow Up Call    Outreach made within 2 business days of discharge: Yes    Patient: Mercedes Del Rio Patient : 1965   MRN: [id number]  Reason for Admission: There are no discharge diagnoses documented for the most recent discharge. Discharge Date: 23       Spoke with: Patient     Discharge department/facility: [facility]     TCM Interactive Patient Contact:  Was patient able to fill all prescriptions: Yes  Was patient instructed to bring all medications to the follow-up visit: Yes  Is patient taking all medications as directed in the discharge summary?  Yes  Does patient understand their discharge instructions: Yes  Does patient have questions or concerns that need addressed prior to 7-14 day follow up office visit: no    Scheduled appointment with PCP within 7-14 days    Follow Up  Future Appointments   Date Time Provider Kiana Barrera   2023 11:00 AM Vadim Gudino DO [address] [place] [address]   2023  8:45 AM Soumya Royal MD N [facility]   2023  2:00 PM Scott Mcclure APRN - [facility] - [address] [place] [address] ([address] [place] [address])

## 2023-02-06 ENCOUNTER — OFFICE VISIT (OUTPATIENT)
Dept: FAMILY MEDICINE CLINIC | Age: 58
End: 2023-02-06

## 2023-02-06 ENCOUNTER — NURSE ONLY (OUTPATIENT)
Dept: LAB | Age: 58
End: 2023-02-06

## 2023-02-06 VITALS
HEIGHT: 72 IN | RESPIRATION RATE: 18 BRPM | TEMPERATURE: 97.1 F | HEART RATE: 85 BPM | OXYGEN SATURATION: 95 % | SYSTOLIC BLOOD PRESSURE: 134 MMHG | DIASTOLIC BLOOD PRESSURE: 74 MMHG | WEIGHT: 315 LBS | BODY MASS INDEX: 42.66 KG/M2

## 2023-02-06 DIAGNOSIS — N28.1 RENAL CYST: ICD-10-CM

## 2023-02-06 DIAGNOSIS — Z95.5 S/P CORONARY ARTERY STENT PLACEMENT: ICD-10-CM

## 2023-02-06 DIAGNOSIS — E78.00 PURE HYPERCHOLESTEROLEMIA: ICD-10-CM

## 2023-02-06 DIAGNOSIS — E66.01 MORBID OBESITY (HCC): ICD-10-CM

## 2023-02-06 DIAGNOSIS — E11.9 NEW ONSET TYPE 2 DIABETES MELLITUS (HCC): ICD-10-CM

## 2023-02-06 DIAGNOSIS — R16.0 HEPATOMEGALY: ICD-10-CM

## 2023-02-06 DIAGNOSIS — Z12.5 SCREENING FOR PROSTATE CANCER: ICD-10-CM

## 2023-02-06 DIAGNOSIS — D64.9 ANEMIA, UNSPECIFIED TYPE: ICD-10-CM

## 2023-02-06 DIAGNOSIS — M20.41 HAMMER TOE OF RIGHT FOOT: ICD-10-CM

## 2023-02-06 DIAGNOSIS — K21.9 GASTROESOPHAGEAL REFLUX DISEASE, UNSPECIFIED WHETHER ESOPHAGITIS PRESENT: ICD-10-CM

## 2023-02-06 DIAGNOSIS — Z09 HOSPITAL DISCHARGE FOLLOW-UP: ICD-10-CM

## 2023-02-06 DIAGNOSIS — M77.41 METATARSALGIA OF RIGHT FOOT: ICD-10-CM

## 2023-02-06 DIAGNOSIS — I20.0 UNSTABLE ANGINA (HCC): Primary | ICD-10-CM

## 2023-02-06 DIAGNOSIS — E04.2 MULTINODULAR GOITER: ICD-10-CM

## 2023-02-06 DIAGNOSIS — G47.33 OSA TREATED WITH BIPAP: ICD-10-CM

## 2023-02-06 DIAGNOSIS — E01.0 THYROMEGALY: ICD-10-CM

## 2023-02-06 DIAGNOSIS — D35.00 ADRENAL ADENOMA, UNSPECIFIED LATERALITY: ICD-10-CM

## 2023-02-06 LAB
ALBUMIN SERPL BCG-MCNC: 4 G/DL (ref 3.5–5.1)
ALP SERPL-CCNC: 106 U/L (ref 38–126)
ALT SERPL W/O P-5'-P-CCNC: 37 U/L (ref 11–66)
ANION GAP SERPL CALC-SCNC: 13 MEQ/L (ref 8–16)
AST SERPL-CCNC: 33 U/L (ref 5–40)
BASOPHILS ABSOLUTE: 0.1 THOU/MM3 (ref 0–0.1)
BASOPHILS NFR BLD AUTO: 1 %
BILIRUB SERPL-MCNC: 0.4 MG/DL (ref 0.3–1.2)
BUN SERPL-MCNC: 15 MG/DL (ref 7–22)
CALCIUM SERPL-MCNC: 9.4 MG/DL (ref 8.5–10.5)
CHLORIDE SERPL-SCNC: 108 MEQ/L (ref 98–111)
CHOLEST SERPL-MCNC: 117 MG/DL (ref 100–199)
CO2 SERPL-SCNC: 22 MEQ/L (ref 23–33)
CREAT SERPL-MCNC: 0.8 MG/DL (ref 0.4–1.2)
CREAT UR-MCNC: 220.3 MG/DL
DEPRECATED MEAN GLUCOSE BLD GHB EST-ACNC: 129 MG/DL (ref 70–126)
DEPRECATED RDW RBC AUTO: 47.6 FL (ref 35–45)
EOSINOPHIL NFR BLD AUTO: 3.2 %
EOSINOPHILS ABSOLUTE: 0.3 THOU/MM3 (ref 0–0.4)
ERYTHROCYTE [DISTWIDTH] IN BLOOD BY AUTOMATED COUNT: 15.1 % (ref 11.5–14.5)
GFR SERPL CREATININE-BSD FRML MDRD: > 60 ML/MIN/1.73M2
GLUCOSE SERPL-MCNC: 144 MG/DL (ref 70–108)
HBA1C MFR BLD HPLC: 6.3 % (ref 4.4–6.4)
HCT VFR BLD AUTO: 45.8 % (ref 42–52)
HDLC SERPL-MCNC: 40 MG/DL
HGB BLD-MCNC: 13.9 GM/DL (ref 14–18)
IMM GRANULOCYTES # BLD AUTO: 0.03 THOU/MM3 (ref 0–0.07)
IMM GRANULOCYTES NFR BLD AUTO: 0.4 %
LDLC SERPL CALC-MCNC: 60 MG/DL
LYMPHOCYTES ABSOLUTE: 1.3 THOU/MM3 (ref 1–4.8)
LYMPHOCYTES NFR BLD AUTO: 15.6 %
MCH RBC QN AUTO: 26.2 PG (ref 26–33)
MCHC RBC AUTO-ENTMCNC: 30.3 GM/DL (ref 32.2–35.5)
MCV RBC AUTO: 86.4 FL (ref 80–94)
MICROALBUMIN UR-MCNC: < 1.2 MG/DL
MICROALBUMIN/CREAT RATIO PNL UR: 5 MG/G (ref 0–30)
MONOCYTES ABSOLUTE: 0.9 THOU/MM3 (ref 0.4–1.3)
MONOCYTES NFR BLD AUTO: 10.6 %
NEUTROPHILS NFR BLD AUTO: 69.2 %
NRBC BLD AUTO-RTO: 0 /100 WBC
PLATELET # BLD AUTO: 400 THOU/MM3 (ref 130–400)
PMV BLD AUTO: 9.6 FL (ref 9.4–12.4)
POTASSIUM SERPL-SCNC: 4.8 MEQ/L (ref 3.5–5.2)
PROT SERPL-MCNC: 7.1 G/DL (ref 6.1–8)
PSA SERPL-MCNC: 2.9 NG/ML (ref 0–1)
RBC # BLD AUTO: 5.3 MILL/MM3 (ref 4.7–6.1)
SEGMENTED NEUTROPHILS ABSOLUTE COUNT: 5.7 THOU/MM3 (ref 1.8–7.7)
SODIUM SERPL-SCNC: 143 MEQ/L (ref 135–145)
TRIGL SERPL-MCNC: 85 MG/DL (ref 0–199)
TSH SERPL DL<=0.005 MIU/L-ACNC: 1.7 UIU/ML (ref 0.4–4.2)
WBC # BLD AUTO: 8.3 THOU/MM3 (ref 4.8–10.8)

## 2023-02-06 SDOH — ECONOMIC STABILITY: HOUSING INSECURITY
IN THE LAST 12 MONTHS, WAS THERE A TIME WHEN YOU DID NOT HAVE A STEADY PLACE TO SLEEP OR SLEPT IN A SHELTER (INCLUDING NOW)?: NO

## 2023-02-06 SDOH — ECONOMIC STABILITY: FOOD INSECURITY: WITHIN THE PAST 12 MONTHS, YOU WORRIED THAT YOUR FOOD WOULD RUN OUT BEFORE YOU GOT MONEY TO BUY MORE.: NEVER TRUE

## 2023-02-06 SDOH — ECONOMIC STABILITY: INCOME INSECURITY: HOW HARD IS IT FOR YOU TO PAY FOR THE VERY BASICS LIKE FOOD, HOUSING, MEDICAL CARE, AND HEATING?: NOT HARD AT ALL

## 2023-02-06 SDOH — ECONOMIC STABILITY: FOOD INSECURITY: WITHIN THE PAST 12 MONTHS, THE FOOD YOU BOUGHT JUST DIDN'T LAST AND YOU DIDN'T HAVE MONEY TO GET MORE.: NEVER TRUE

## 2023-02-06 ASSESSMENT — PATIENT HEALTH QUESTIONNAIRE - PHQ9
SUM OF ALL RESPONSES TO PHQ QUESTIONS 1-9: 0
SUM OF ALL RESPONSES TO PHQ QUESTIONS 1-9: 0
SUM OF ALL RESPONSES TO PHQ9 QUESTIONS 1 & 2: 0
SUM OF ALL RESPONSES TO PHQ QUESTIONS 1-9: 0
SUM OF ALL RESPONSES TO PHQ QUESTIONS 1-9: 0
1. LITTLE INTEREST OR PLEASURE IN DOING THINGS: 0
2. FEELING DOWN, DEPRESSED OR HOPELESS: 0

## 2023-02-06 NOTE — PROGRESS NOTES
Post-Discharge Transitional Care Management [address]   YOB: 1965    Date of Visit:  2/6/2023  30 Day Post-Discharge Date: 2/29/23  Chief Complaint   Patient presents with    Follow-Up from Hospital     Patient was discharged from [facility] on 1/29/2023 for chest pain    Foot Pain     Patient has been experiencing right foot pain      Hospital follow up. Patient's PCP: Luis Bryant DO     Admit Date: 1/26/2023      Discharge Date:   1/29/2023     Admitting Physician: No admitting provider for patient encounter. Discharging Nurse Practitioner: KANIKA Morgan - CNP      Discharge Diagnoses with Assessment/Plan:  Unstable angina S/P PCI/JENNIFER to LAD on 1/28/2023 --ACS ruled out by 3 negative troponins; appreciate cardiology input; on aspirin, statin, beta-blocker, Brilinta; CTA chest did not reveal a PE, no aortic aneurysm or dissection is present; echocardiogram with EF 60 to 65%  Primary hypertension, uncontrolled--on Toprol  Chronic normocytic anemia--stable  KIRAN--on BiPAP 15/11 per office note dated July 11, 2022, follows with Lanette Garcia  Left lobe of the thyroid gland is enlarged and heterogeneous in attenuation--needs outpatient follow-up  12 mm left adrenal adenoma--needs outpatient follow-up  Known hepatomegaly and hepatic steatosis--outpatient follow-up  25 mm simple cyst in the upper pole of the left kidney  CAD S/P JENNIFER to LAD January 12, 2015--on aspirin, statin, beta-blocker  Morbid obesity with BMI 46.56--encouraged weight loss strategies; may benefit from bariatric evaluation     The patient was seen and examined on day of discharge and this discharge summary is in conjunction with any daily progress note from day of discharge.      Hospital Course:   Keo Salinas is a 59 y.o. male admitted to [facility] on 1/26/2023 for chest pain; Per H&P done 1/26/2023: Bard Palacio is a 59 y.o. male with PMHx of anxiety, CAD, chronic back pain, STEVEN, PE, KIRAN, thyroid nodule who presented to [facility] with chief complaint of chest pain, SOB. Patient reports dyspnea on exertion ongoing for the last few months. He states he is not seen cardiology, as he was waiting until his appointment next month. Patient states that this is new for him, he is now unable to climb a flight of stairs without becoming short of breath. Reports mild lower extremity edema starting over the last month. He states this is worse after work and resolves with leg elevation. Patient states that he has always slept sitting up, so unclear if any orthopnea. Patient states that today, he was walking into work when he had sudden onset of chest pain that radiated to his back and left arm. Associated with shortness of breath. No lightheadedness, syncope, fever/chills, abdominal pain, N/V/D, urinary symptoms. \"     1/27--> hemodynamically stable however blood pressure is a bit on the higher side prior to medications this morning; echocardiogram ordered, awaiting cardiology input     1/28--> hemodynamically stable, n.p.o. for cardiac catheterization today     1/29-->hemodynamically stable, eating well, states he feels much better, we discussed the importance of diet modification/weight reduction; cardiology is following at this time stable for discharge home so will be discharged home in stable condition with outpatient follow-up. Doing well from cardiac standpoint.         No Known Allergies  Outpatient Medications Marked as Taking for the 2/6/23 encounter (Office Visit) with Ashlee Burciaga, DO   Medication Sig Dispense Refill    nitroGLYCERIN (NITROSTAT) 0.4 MG SL tablet Place 1 tablet under the tongue every 5 minutes as needed for Chest pain 25 tablet 3    atorvastatin (LIPITOR) 40 MG tablet Take 1 tablet by mouth daily 30 tablet 3    ticagrelor (BRILINTA) 90 MG TABS tablet Take 1 tablet by mouth 2 times daily 60 tablet 1    lisinopril (PRINIVIL;ZESTRIL) 5 MG tablet Take 1 tablet by mouth daily 30 tablet 3    Semaglutide (RYBELSUS) 7 MG TABS TAKE 1 TABLET BY MOUTH  DAILY 90 tablet 3    pantoprazole (PROTONIX) 40 MG tablet TAKE 1 TABLET BY MOUTH  TWICE DAILY 180 tablet 3    metoprolol succinate (TOPROL XL) 25 MG extended release tablet Take 1 tablet twice a day 180 tablet 3    Multiple Vitamins-Minerals (THERAPEUTIC MULTIVITAMIN-MINERALS) tablet Take 1 tablet by mouth daily      aspirin 81 MG tablet Take 81 mg by mouth daily. Vitals:    02/06/23 0837   BP: 134/74   Site: Right Upper Arm   Position: Sitting   Cuff Size: Large Adult   Pulse: 85   Resp: 18   Temp: 97.1 °F (36.2 °C)   TempSrc: Temporal   SpO2: 95%   Weight: (!) 341 lb 9.6 oz (154.9 kg)   Height: 6' (1.829 m)     Body mass index is 46.33 kg/m². Wt Readings from Last 3 Encounters:   02/06/23 (!) 341 lb 9.6 oz (154.9 kg)   01/29/23 (!) 343 lb 4.8 oz (155.7 kg)   09/13/22 (!) 339 lb 3.2 oz (153.9 kg)     BP Readings from Last 3 Encounters:   02/06/23 134/74   01/29/23 135/66   09/13/22 127/68        Patient was admitted to [facility] from 1/26/23 to 1/29/23 for CAD. Inpatient course: Discharge summary reviewed- see chart. Current status: improved    Review of Systems:  A comprehensive review of systems was negative except for what was noted in the HPI.     Physical Exam:  General Appearance: alert and oriented to person, place and time, well developed and well- nourished, in no acute distress  Skin: warm and dry, no rash or erythema  Head: normocephalic and atraumatic  Eyes: pupils equal, round, and reactive to light, extraocular eye movements intact, conjunctivae normal  ENT: tympanic membrane, external ear and ear canal normal bilaterally, nose without deformity, nasal mucosa and turbinates normal without polyps  Neck: supple and non-tender without mass, no thyromegaly or thyroid nodules, no cervical lymphadenopathy  Pulmonary/Chest: clear to auscultation bilaterally- no wheezes, rales or rhonchi, normal air movement, no respiratory distress  Cardiovascular: normal rate, regular rhythm, normal S1 and S2, no murmurs, rubs, clicks, or gallops, distal pulses intact, no carotid bruits  Abdomen: soft, non-tender, non-distended, normal bowel sounds, no masses or organomegaly  Extremities: no cyanosis, clubbing or edema  Musculoskeletal: normal range of motion, no joint swelling, deformity or tenderness  Neurologic: reflexes normal and symmetric, no cranial nerve deficit, gait, coordination and speech normal    Initial post-discharge communication occurred between medical assistant and patient on 1/30/23- see documentation in chart: telephone encounter. Assessment/Plan:  Robe Lundy was seen today for follow-up from hospital and foot pain. Diagnoses and all orders for this visit:    Unstable angina (HCC)    S/P coronary artery stent placement    Anemia, unspecified type    KIRAN treated with BiPAP    Thyromegaly  -     US THYROID; Future    Multinodular goiter  -     US THYROID; Future    Adrenal adenoma, unspecified laterality    Hepatomegaly    Renal cyst  -     US RENAL COMPLETE; Future    Morbid obesity ([address]    New onset type 2 diabetes mellitus ([address]  -     Comprehensive Metabolic Panel; Future  -     Hemoglobin A1C; Future  -     Microalbumin / Creatinine Urine Ratio; Future    Gastroesophageal reflux disease, unspecified whether esophagitis present  -     CBC with Auto Differential; Future    Pure hypercholesterolemia  -     Lipid Panel w/ Reflex Direct LDL; Future  -     Comprehensive Metabolic Panel; Future  -     TSH with Reflex; Future    Metatarsalgia of right foot  -     Fina Cazares DPM, Podiatry, [address] toe of right foot  -     Fina Cazares DPM, [facility] [address] [address]    Screening for prostate cancer  -     PSA Screening;  Future      -  Chronic medical problems stable  -  Continue current medications  -  Follow up with specialists as scheduled  -  Additional orders above, will call with results and recommendations  -  RTO prn    Diagnostic test results reviewed: inpatient labs, EKG, chest x-ray, CT angiogram-chest, echocardiogram, and angiogram-heart cath    Patient risk of morbidity and mortality: high    Medical Decision Making: high complexity

## 2023-02-07 ENCOUNTER — CARE COORDINATION (OUTPATIENT)
Dept: CASE MANAGEMENT | Age: 58
End: 2023-02-07

## 2023-02-07 ENCOUNTER — CARE COORDINATION (OUTPATIENT)
Dept: CARE COORDINATION | Age: 58
End: 2023-02-07

## 2023-02-07 NOTE — CARE COORDINATION
RD received referral from Mariella OWENS:  [place]! Orkathia Daily is interested in low sodium, heart healthy diet   Recently d/c'd from [facility]. [facility] after HC/stents for CAD. Hx of  HTN, anemia, CAD, KIRAN, STEVEN, anxiety, chronic back pain   Will need to RTW next week he thinks and cannot go to cardiac rehab as hoped. RD contacted Cassandra Hill regarding Dietitian referral. Pt answered, RD explained reason for call and role in care. Pt prefers handouts in the mail with a follow up call. RD verified address. RD will mail Heart Healthy Nutrition Therapy, Low Sodium Nutrition Therapy, Heart Healthy Shopping Tips and Eating Right with MyPlate. RD will outreach in 2-3 weeks to follow up and answer any nutrition related questions at this time.     [address] [facility], [address] [place] [address]

## 2023-02-07 NOTE — CARE COORDINATION
[facility] Care Transitions Follow Up Call    Care Transition Nurse contacted the patient by telephone to follow up after admission on 23. Verified name and  with patient as identifiers. Patient: Christine Cox  Patient : 1965   MRN: [id number]  Reason for Admission: chest pain  Discharge Date: 23 RARS: Readmission Risk Score: 7      Needs to be reviewed by the provider   Additional needs identified to be addressed with provider: No  none             Method of communication with provider: none. CTN call to [facility] today and he says he is feeling pretty good. Denies chest pain, sob, swelling, n/v/d, dizziness. Says R radial site healed. PCP HFU-> labs, US kidney/thyroid  LM=631/74  Cardio HFU 23  Hopes to RTW Monday but will not be able to go to cardiac rehab. Is thinking about joining the Health system. Needs clearance from Cardio. Dietician referral offered and accepted for low sodium/heart healthy diet. Informed of [phone] area code. Referral placed to [address] [place] [address]. Eating, drinking & sleeping well. Says watches salt closely. Denies problems w/ urination/bowels. No other concerns voiced at this time. Addressed changes since last contact:  none  Discussed follow-up appointments. If no appointment was previously scheduled, appointment scheduling offered: Yes. Is follow up appointment scheduled within 7 days of discharge? Yes. Follow Up  Future Appointments   Date Time Provider Kiana Barrera   2023  3:15 PM Yoko Yates [facility]   2023  8:45 AM Yousif Cosme MD N [facility]   2023  2:00 PM KANIKA Morales [address]     [facility] follow up appointment(s):     Care Transition Nurse reviewed discharge instructions, medical action plan, and red flags with patient and discussed any barriers to care and/or understanding of plan of care after discharge.  Discussed appropriate site of care based on symptoms and resources available to patient including: PCP  Specialist  Urgent care clinics  When to call 911. The patient agrees to contact the PCP office for questions related to their healthcare. Advance Care Planning:   reviewed and current. Patients top risk factors for readmission: lack of knowledge about disease and medical condition-HTN, CAD, KIRAN, anxiety, STEVEN, anemia, chronic back pain  Interventions to address risk factors: Scheduled appointment with Specialist-2/9/23    Offered patient enrollment in the Remote Patient Monitoring (RPM) program for in-home monitoring: Patient is not eligible for RPM program.     Care Transitions Subsequent and Final Call    Schedule Follow Up Appointment with PCP: Completed  Subsequent and Final Calls  Do you have any ongoing symptoms?: No  Have your medications changed?: No  Do you have any questions related to your medications?: No  Do you currently have any active services?: No  Do you have any needs or concerns that I can assist you with?: No  Identified Barriers: Lack of Education  Care Transitions Interventions     Transportation Support: Declined   Disease Specific Clinic: Completed      Disease Association: Completed      Other Interventions:             Care Transition Nurse provided contact information for future needs. Plan for follow-up call in 7-10 days based on severity of symptoms and risk factors.   Plan for next call: symptom management-chest pain, sob, swelling,   self management-BP, exercise/diet?  follow-up appointment-cardio 2/9/23-> review  referrals-dietician    Stephon Morel RN

## 2023-02-08 ENCOUNTER — CARE COORDINATION (OUTPATIENT)
Dept: CARE COORDINATION | Age: 58
End: 2023-02-08

## 2023-02-09 ENCOUNTER — OFFICE VISIT (OUTPATIENT)
Dept: CARDIOLOGY CLINIC | Age: 58
End: 2023-02-09
Payer: COMMERCIAL

## 2023-02-09 VITALS
SYSTOLIC BLOOD PRESSURE: 119 MMHG | WEIGHT: 315 LBS | HEART RATE: 83 BPM | DIASTOLIC BLOOD PRESSURE: 66 MMHG | HEIGHT: 72 IN | BODY MASS INDEX: 42.66 KG/M2

## 2023-02-09 DIAGNOSIS — Z98.62 S/P ANGIOPLASTY: ICD-10-CM

## 2023-02-09 DIAGNOSIS — E78.01 FAMILIAL HYPERCHOLESTEROLEMIA: ICD-10-CM

## 2023-02-09 DIAGNOSIS — I25.10 ATHEROSCLEROSIS OF NATIVE CORONARY ARTERY OF NATIVE HEART WITHOUT ANGINA PECTORIS: Primary | ICD-10-CM

## 2023-02-09 PROCEDURE — 99214 OFFICE O/P EST MOD 30 MIN: CPT | Performed by: STUDENT IN AN ORGANIZED HEALTH CARE EDUCATION/TRAINING PROGRAM

## 2023-02-09 RX ORDER — LISINOPRIL 5 MG/1
5 TABLET ORAL DAILY
Qty: 90 TABLET | Refills: 3 | Status: SHIPPED | OUTPATIENT
Start: 2023-02-09

## 2023-02-09 RX ORDER — ATORVASTATIN CALCIUM 40 MG/1
40 TABLET, FILM COATED ORAL DAILY
Qty: 90 TABLET | Refills: 3 | Status: SHIPPED | OUTPATIENT
Start: 2023-02-09

## 2023-02-09 NOTE — PROGRESS NOTES
Pt here for [facility] fu cath with stent     Pt continues with sob ,     Pt has FMLA forms to return to work

## 2023-02-09 NOTE — LETTER
[address] [facility]  [facility].  [address] [place] [address]  Phone: [phone]  Fax: [phone]    Taylor Sands PA-C        February 9, 2023     Patient: Pily Andujar   YOB: 1965   Date of Visit: 2/9/2023       To Whom It May Concern: It is my medical opinion that Samson León may return to work with no restrictions on 2/13/23 from a cardiac standpoint. If you have any questions or concerns, please don't hesitate to call.     Sincerely,        Taylor Sands PA-C

## 2023-02-09 NOTE — PROGRESS NOTES
[facility] PROFESSIONAL SERVICES  [facility]   [address]   [address] [address]   Dept: [phone]   Dept Fax: [phone]: [phone]      Chief Complaint   Patient presents with    Follow-Up from Hospital       Cardiologist:  Dr. Isa Hurtado  60 yo male presents for hfu for [place], s/p PCI to LAD. Hx of CAD and prior stents, HTN, HLD, obesity. No chest pain. Breathing has been a lot better. Gets SOB with more exertion and maybe a little with taking brilinta but this is getting better. Understands goals and has no further questions. Ready to get back to work. General:   No fever, no chills, no weight loss, no fatigue  Pulmonary:    No dyspnea, no wheezing  Cardiac:    Denies recent chest pain   GI:     No nausea or vomiting, no abdominal pain  Neuro:     No dizziness or light headedness  Musculoskeletal:  No recent active issues  Extremities:   No edema      Past Medical History:   Diagnosis Date    Anxiety attacks     Anxiety due to family issues. Arthritis     Asthma     as child    Atypical chest pain     Cuevas esophagus     Chronic back pain     Coronary artery disease     S/P stent of the LAD by Dr. Milagros Park on 2 23 2010. Diabetes mellitus, type 2 ([address]     Erectile dysfunction     Family history of premature CAD     Gastroesophageal reflux disease     Groin hematoma     Herniated disc 2011    back and neck    History of erectile dysfunction 2008    History of giardia infection     History of Giardia. History of iron deficiency     History of pulmonary embolus (PE) 1980's. Remote history of pulmonary embolus and negative computed tomography for pulmonary embolus. History of smoking     Hyperlipidemia     Well managed. Hypogonadism, male     The patient has central hypogonadism.      Joint pain     Morbid obesity     KIRAN (obstructive sleep apnea)     S/P angioplasty     S/P PTCA: 1/12/2015: FFR-guided stenting of distal and mid-LAD using Xience Alpine 2.5X15, post-2.77, and Alpine 2.75X18 mm, post-3.18 mm.  2015: FFR-guided stenting of distal and mid-LAD using Xience Alpine 2.5 X 15 mm, post-dilated to 2.77 mm, and Xience Alpine 2.75 X 18 mm, post-dilated to 3.18 mm. Dr. Therese Lira   2010: Stenting of distal LAD using Xience 2.5 X 15 overlapping distally with Xience 2.5 X 12 mm, Dr. Denisha Durham    Testosterone deficiency     Thyroid nodule     The patient is euthyroid. No Known Allergies    Current Outpatient Medications   Medication Sig Dispense Refill    nitroGLYCERIN (NITROSTAT) 0.4 MG SL tablet Place 1 tablet under the tongue every 5 minutes as needed for Chest pain 25 tablet 3    atorvastatin (LIPITOR) 40 MG tablet Take 1 tablet by mouth daily 30 tablet 3    ticagrelor (BRILINTA) 90 MG TABS tablet Take 1 tablet by mouth 2 times daily 60 tablet 1    lisinopril (PRINIVIL;ZESTRIL) 5 MG tablet Take 1 tablet by mouth daily 30 tablet 3    Semaglutide (RYBELSUS) 7 MG TABS TAKE 1 TABLET BY MOUTH  DAILY 90 tablet 3    pantoprazole (PROTONIX) 40 MG tablet TAKE 1 TABLET BY MOUTH  TWICE DAILY 180 tablet 3    metoprolol succinate (TOPROL XL) 25 MG extended release tablet Take 1 tablet twice a day 180 tablet 3    Multiple Vitamins-Minerals (THERAPEUTIC MULTIVITAMIN-MINERALS) tablet Take 1 tablet by mouth daily      aspirin 81 MG tablet Take 81 mg by mouth daily. No current facility-administered medications for this visit.        Social History     Socioeconomic History    Marital status: Single     Spouse name: None    Number of children: None    Years of education: 12    Highest education level: 12th grade   Tobacco Use    Smoking status: Former     Packs/day: 1.00     Years: 14.00     Pack years: 14.00     Types: Cigarettes     Quit date: 8/3/2002     Years since quittin.5    Smokeless tobacco: Current     Types: Chew    Tobacco comments:     Patient states, \"he chews tobacco.\"    Vaping Use    Vaping Use: Never used Substance and Sexual Activity    Alcohol use: Yes     Alcohol/week: 0.0 standard drinks     Comment: Patient states, \"seldom. \"    Drug use: No    Sexual activity: Yes     Social Determinants of Health     Financial Resource Strain: Low Risk     Difficulty of Paying Living Expenses: Not hard at all   Food Insecurity: No Food Insecurity    Worried About Running Out of Food in the Last Year: Never true    Ran Out of Food in the Last Year: Never true   Transportation Needs: Unknown    Lack of Transportation (Non-Medical): No   Housing Stability: Unknown    Unstable Housing in the Last Year: No       Family History   Problem Relation Age of Onset    Heart Disease Mother         Bypass/Surgery. Cancer Mother         Breast cancer. Coronary Art Dis Mother         History of CAD with myocardial infarction and open heart surgery in her 63's. COPD Mother     Heart Disease Father         Bypass/Surgery. Cancer Father         Leukemia. Coronary Art Dis Father         History of CAD with myocardial infarction and open heart surgery around his 52's. Prostate Cancer Neg Hx        Blood pressure 119/66, pulse 83, height 6' (1.829 m), weight (!) 340 lb (154.2 kg). General:   Well developed, well nourished  Lungs:    Clear to auscultation, no rales  Heart:    RRR, Normal S1 S2, No murmur, rubs, or gallops  Abdomen:   Soft, non tender, no organomegalies, positive bowel sounds, obese  Extremities:   No edema, no cyanosis, good peripheral pulses  Neurological:   Awake, alert, oriented. No obvious focal deficits  Musculoskeletal:  No obvious deformities    EKG:          Diagnosis Orders   1. Atherosclerosis of native coronary artery of native heart without angina pectoris        2. Familial hypercholesterolemia        3. S/P angioplasty            No orders of the defined types were placed in this encounter. Assessment/Plan:   CAD/[place] s/p PCI to LAD- doing well, improved. Toelrating meds well.  Continue GDMT and RFM. HLD-continue lipitor 40 daily.      Disposition:   Follow up with Dr David Milan as scheduled or sooner if needed

## 2023-02-10 ENCOUNTER — HOSPITAL ENCOUNTER (OUTPATIENT)
Dept: ULTRASOUND IMAGING | Age: 58
Discharge: HOME | End: 2023-02-10
Payer: COMMERCIAL

## 2023-02-10 DIAGNOSIS — E01.0 THYROMEGALY: ICD-10-CM

## 2023-02-10 DIAGNOSIS — E04.2 MULTINODULAR GOITER: ICD-10-CM

## 2023-02-10 DIAGNOSIS — N28.1 RENAL CYST: ICD-10-CM

## 2023-02-10 PROCEDURE — 76770 US EXAM ABDO BACK WALL COMP: CPT

## 2023-02-10 PROCEDURE — 76536 US EXAM OF HEAD AND NECK: CPT

## 2023-02-13 ENCOUNTER — CARE COORDINATION (OUTPATIENT)
Dept: CASE MANAGEMENT | Age: 58
End: 2023-02-13

## 2023-02-13 NOTE — CARE COORDINATION
[facility] Care Transitions Follow Up Call:Final Call    Patient Current Location:  Home: [address] [facility] [address]    Care Transition Nurse contacted the patient by telephone to follow up after admission on 23. Verified name and  with patient as identifiers. Patient: Sagar Umana  Patient : 1965   MRN: [id number]  Reason for Admission: chest pain  Discharge Date: 23 RARS: Readmission Risk Score: 7      Needs to be reviewed by the provider   Additional needs identified to be addressed with provider: No  none             Method of communication with provider: none. CTN call to [facility] today and he says he is feeling really good, RTW today. Denies chest pain, sob, swelling, dizziness, n/v/d, diaphoresis. BP=good  Dietician contacted him and he will be receiving info in the mail. Cardio HFU 23->no changes  Joined the [facility] to start exercising. Eating, drinking & sleeping well, Denies any problems w/ urination/bowels. No other concerns voiced today. CTN informed him of final call but if he has concerns he can call. He expressed appreciation for the care. Addressed changes since last contact:  none  Discussed follow-up appointments. If no appointment was previously scheduled, appointment scheduling offered: Yes. Is follow up appointment scheduled within 7 days of discharge? Yes. Follow Up  Future Appointments   Date Time Provider Kiana Barrera   2023  8:45 AM Robert Gentile MD N [facility] [place]   2023  2:00 PM [facility]     [facility] follow up appointment(s):     Care Transition Nurse reviewed discharge instructions, medical action plan, and red flags with patient and discussed any barriers to care and/or understanding of plan of care after discharge. Discussed appropriate site of care based on symptoms and resources available to patient including: PCP  Specialist  When to call 911.  The patient agrees to contact the PCP office for questions related to their healthcare. Advance Care Planning:   reviewed and current. Patients top risk factors for readmission: lack of knowledge about disease and medical condition-HTN, CAD, anemia, KIRAN, STEVEN, anxiety chronic back pain  Interventions to address risk factors: Scheduled appointment with Specialist-7/7/23 cardio    Offered patient enrollment in the Remote Patient Monitoring (RPM) program for in-home monitoring: Patient is not eligible for RPM program.     Care Transitions Subsequent and Final Call    Schedule Follow Up Appointment with PCP: Completed  Subsequent and Final Calls  Do you have any ongoing symptoms?: No  Have your medications changed?: No  Do you have any questions related to your medications?: No  Do you currently have any active services?: No  Do you have any needs or concerns that I can assist you with?: No  Identified Barriers: Lack of Education  Care Transitions Interventions     Transportation Support: Declined   Disease Specific Clinic: Completed      Disease Association: Completed      Other Interventions:             Care Transition Nurse provided contact information for future needs. No further follow-up call indicated based on severity of symptoms and risk factors.   Plan for next call:  none    Stewart Herbert RN

## 2023-02-15 ENCOUNTER — TELEMEDICINE (OUTPATIENT)
Dept: FAMILY MEDICINE CLINIC | Age: 58
End: 2023-02-15

## 2023-02-15 DIAGNOSIS — D64.9 ANEMIA, UNSPECIFIED TYPE: ICD-10-CM

## 2023-02-15 DIAGNOSIS — N28.1 RENAL CYST: ICD-10-CM

## 2023-02-15 DIAGNOSIS — E01.0 THYROMEGALY: Primary | ICD-10-CM

## 2023-02-15 DIAGNOSIS — E04.2 MULTINODULAR GOITER: ICD-10-CM

## 2023-02-15 DIAGNOSIS — R97.20 ELEVATED PSA: ICD-10-CM

## 2023-02-15 NOTE — PROGRESS NOTES
Suzanne Leon (:  1965) is a Established patient, here for evaluation of the following:  Suzanne Leon is a 59 y.o. male evaluated via telephone on 2/15/2023 for Abnormal Test Results  . Documentation:  I communicated with the patient and/or health care decision maker about review tests. Details of this discussion including any medical advice provided: see A/P    Total Time: minutes: 21-30 minutes    Suzanne Leon was evaluated through a synchronous (real-time) audio encounter. Patient identification was verified at the start of the visit. He (or guardian if applicable) is aware that this is a billable service, which includes applicable co-pays. This visit was conducted with the patient's (and/or legal guardian's) verbal consent. He has not had a related appointment within my department in the past 7 days or scheduled within the next 24 hours. The patient was located at Home: [address] [place] [address]. The provider was located at [facility] (Appt Dept): [address] [place] [address] [facility]  [address] [place] [address],  [address] [facility]. Note: not billable if this call serves to triage the patient into an appointment for the relevant concern    DO Jorge Burton   HPI:    Chief Complaint   Patient presents with    Abnormal Test Results     Pt presents today to review recent testing. Impression       1. A 4.2 x 3.8 x 2.9 cm isoechoic nodule in the mid to inferior left thyroid lobe has shown interval increase in size. This has undergone prior fine-needle aspiration       2. The remaining thyroid nodules are relatively stable in size. 3. Marked thyromegaly. Impression   1. Left renal cyst. Elevated resistive index left side. 2. Otherwise normal renal ultrasound. Mild rise in PSA, asymptomatic.   Lab Results   Component Value Date    PSA 2.90 (H) 2023    PSA 2.22 (H) 2022    PSA 1.91 (H) 2021       Patient Active Problem List   Diagnosis    Asthma Gastroesophageal reflux disease    Hyperlipidemia    History of smoking    Chronic back pain    S/P angioplasty    Groin hematoma    Testosterone deficiency    Joint pain    Arthritis    Atypical chest pain    Family history of premature CAD    Hypogonadism, male    Thyroid nodule    History of erectile dysfunction    Male sexual dysfunction    Heart disease    Chest pain with moderate risk of acute coronary syndrome    Coronary atherosclerosis    S/P PTCA: 2015: FFR-guided stenting of distal and mid-LAD using Xience Alpine 2.5X15, post-2.77, and Alpine 2.75X18 mm, post-3.18 mm. Obesity (BMI 30-39. 9)    Obstructive sleep apnea treated with BiPAP    COVID-19    Chest pain    Unstable angina ([address]     Past Surgical History:   Procedure Laterality Date    APPENDECTOMY      BACK SURGERY      X 2 FOR HERNIATED DISCS    CARDIOVASCULAR STRESS TEST  3 26 2010    Mild chest discomfort induced by IV Lexiscan that resolved spontaneously. No arrhythmias. No EKG changes to suggest ischemia. [address] [facility] [facility] [address] [facility] [address]    COLONOSCOPY      CORONARY ANGIOPLASTY      X 2 STENTS    DIAGNOSTIC CARDIAC CATH LAB PROCEDURE  2010    Successful drug-eluting stent x 2 of mid LAD. LV borderline normal LV function. EF 50-55%. No wall motion abnormalities detected and no MR. Normal hemodynamics. Coronaries - diffuse left anterior descending (LAD) disease with proximal 50% and distal around 70%.      ELBOW SURGERY      right    KNEE SURGERY      left    LYMPH NODE BIOPSY      neck    SHOULDER ARTHROSCOPY      [address]    UPPER GASTROINTESTINAL ENDOSCOPY      US THYROID CYST ASPIRATION AND OR INJECTION  2021    US THYROID CYST ASPIRATION AND OR INJECTION 2021 [facility] ULTRASOUND     Social History     Tobacco Use    Smoking status: Former     Packs/day: 1.00     Years: 14.00     Pack years: 14.00     Types: Cigarettes     Quit date: 8/3/2002     Years since quittin.5 Smokeless tobacco: Current     Types: Chew    Tobacco comments:     Patient states, \"he chews tobacco.\"    Vaping Use    Vaping Use: Never used   Substance Use Topics    Alcohol use: Yes     Alcohol/week: 0.0 standard drinks     Comment: Patient states, \"seldom. \"    Drug use: No       Review of Systems   Constitutional: Negative. HENT: Negative. Respiratory: Negative. Cardiovascular: Negative. Gastrointestinal: Negative. Musculoskeletal: Negative. All other systems reviewed and are negative. Objective   Patient-Reported Vitals  No data recorded     Physical Exam  Constitutional:       General: He is not in acute distress. Appearance: Normal appearance. He is well-developed. He is not ill-appearing. HENT:      Head: Normocephalic and atraumatic. Right Ear: External ear normal.      Left Ear: External ear normal.   Eyes:      Conjunctiva/sclera: Conjunctivae normal.   Pulmonary:      Effort: Pulmonary effort is normal. No respiratory distress. Skin:     Findings: No rash (on exposed surfaces). Neurological:      Mental Status: He is alert and oriented to person, place, and time. Psychiatric:         Mood and Affect: Mood normal.         Behavior: Behavior normal.         Thought Content: Thought content normal.         Judgment: Judgment normal.     Assessment & Plan   Below is the assessment and plan developed based on review of pertinent history, physical exam, labs, studies, and medications. 1. Thyromegaly  2. Multinodular goiter  3. Renal cyst  4. Anemia, unspecified type  5. Elevated PSA    -  Chronic medical problems stable  -  Labs, US's reviewed, abnormalities addressed  -  Continue current medications  -  Follow up with specialists as scheduled    Return for as needed.          On this date 2/15/2023 I have spent 21-30 minutes reviewing previous notes, test results and face to face (virtual) with the patient discussing the diagnosis and importance of compliance with the treatment plan as well as documenting on the day of the visit.            --Della Batista DO

## 2023-02-21 ENCOUNTER — CARE COORDINATION (OUTPATIENT)
Dept: CARE COORDINATION | Age: 58
End: 2023-02-21

## 2023-02-21 ENCOUNTER — PATIENT MESSAGE (OUTPATIENT)
Dept: FAMILY MEDICINE CLINIC | Age: 58
End: 2023-02-21

## 2023-02-21 DIAGNOSIS — E04.2 MULTINODULAR GOITER: ICD-10-CM

## 2023-02-21 DIAGNOSIS — E01.0 THYROMEGALY: Primary | ICD-10-CM

## 2023-02-21 ASSESSMENT — ENCOUNTER SYMPTOMS
GASTROINTESTINAL NEGATIVE: 1
RESPIRATORY NEGATIVE: 1

## 2023-02-21 NOTE — TELEPHONE ENCOUNTER
From: Dr. Riley Wilburn  To: Karyl Hatchet  Sent: 2/21/2023 12:11 PM EST  Subject: thyroid US    Sarita Peres. The more I look at your thyroid US, I'd like to have you see our Endocrinologist for his opinion if willing due to the growing size of the nodules despite hx of normal biopsy. Thoughts?

## 2023-02-21 NOTE — CARE COORDINATION
[employer] regarding Dietitian follow up. Pt answered, RD explained reason for call and role in care. Patient states he received the handouts in the mail. Patient has no nutrition related questions or concerns at this time. RD provided contact information and will follow/assist with patient return call.     [address]

## 2023-03-08 ENCOUNTER — TELEMEDICINE (OUTPATIENT)
Dept: FAMILY MEDICINE CLINIC | Age: 58
End: 2023-03-08
Payer: COMMERCIAL

## 2023-03-08 DIAGNOSIS — J40 BRONCHITIS: Primary | ICD-10-CM

## 2023-03-08 PROCEDURE — 99442 PR PHYS/QHP TELEPHONE EVALUATION 11-20 MIN: CPT | Performed by: FAMILY MEDICINE

## 2023-03-08 RX ORDER — PREDNISONE 20 MG/1
20 TABLET ORAL 2 TIMES DAILY
Qty: 10 TABLET | Refills: 0 | Status: SHIPPED | OUTPATIENT
Start: 2023-03-08 | End: 2023-03-13

## 2023-03-08 RX ORDER — HYDROCODONE POLISTIREX AND CHLORPHENIRAMINE POLISTIREX 10; 8 MG/5ML; MG/5ML
5 SUSPENSION, EXTENDED RELEASE ORAL EVERY 12 HOURS PRN
Qty: 120 ML | Refills: 0 | Status: SHIPPED | OUTPATIENT
Start: 2023-03-08 | End: 2023-03-20

## 2023-03-08 ASSESSMENT — ENCOUNTER SYMPTOMS
COUGH: 1
GASTROINTESTINAL NEGATIVE: 1
CHEST TIGHTNESS: 1

## 2023-03-08 NOTE — LETTER
March 8, 2023       Sam Perez YOB: 1965   [address] [facility] [address] Date of Visit:  3/8/2023       To Whom It May Concern: It is my medical opinion that Luke Fraga may return to work on 3/13/23 with no restrictions. Please excuse from work 3/8, 3/9, and 3/10. If you have any questions or concerns, please don't hesitate to call.     Sincerely,        Trudee Cabot, DO

## 2023-03-08 NOTE — PROGRESS NOTES
Rosalie Palma (:  1965) is a Established patient, here for evaluation of the following:         Subjective   HPI:    Chief Complaint   Patient presents with    Congestion    Cough     Pt presents today with c/o cough, chest congestion for the last 4 days. Chest is tight, cough is deep and productive. No sick contacts. No COVID testing. Patient Active Problem List   Diagnosis    Asthma    Gastroesophageal reflux disease    Hyperlipidemia    History of smoking    Chronic back pain    S/P angioplasty    Groin hematoma    Testosterone deficiency    Joint pain    Arthritis    Atypical chest pain    Family history of premature CAD    Hypogonadism, male    Thyroid nodule    History of erectile dysfunction    Male sexual dysfunction    Heart disease    Chest pain with moderate risk of acute coronary syndrome    Coronary atherosclerosis    S/P PTCA: 2015: FFR-guided stenting of distal and mid-LAD using Xience Alpine 2.5X15, post-2.77, and Alpine 2.75X18 mm, post-3.18 mm. Obesity (BMI 30-39. 9)    Obstructive sleep apnea treated with BiPAP    COVID-19    Chest pain    Unstable angina ([address]     Past Surgical History:   Procedure Laterality Date    APPENDECTOMY      BACK SURGERY      X 2 FOR HERNIATED DISCS    CARDIOVASCULAR STRESS TEST  3 26 2010    Mild chest discomfort induced by IV Lexiscan that resolved spontaneously. No arrhythmias. No EKG changes to suggest ischemia. [address]    COLONOSCOPY      CORONARY ANGIOPLASTY      X 2 STENTS    DIAGNOSTIC CARDIAC CATH LAB PROCEDURE  2010    Successful drug-eluting stent x 2 of mid LAD. LV borderline normal LV function. EF 50-55%. No wall motion abnormalities detected and no MR. Normal hemodynamics. Coronaries - diffuse left anterior descending (LAD) disease with proximal 50% and distal around 70%.      [address]    right    KNEE SURGERY      left    LYMPH NODE BIOPSY      neck    SHOULDER ARTHROSCOPY [address] [place] [address] GASTROINTESTINAL ENDOSCOPY  2013     THYROID CYST ASPIRATION AND OR INJECTION  2021     THYROID CYST ASPIRATION AND OR INJECTION 2021 [facility] ULTRASOUND     Social History     Tobacco Use    Smoking status: Former     Packs/day: 1.00     Years: 14.00     Pack years: 14.00     Types: Cigarettes     Quit date: 8/3/2002     Years since quittin.6    Smokeless tobacco: Current     Types: Chew    Tobacco comments:     Patient states, \"he chews tobacco.\"    Vaping Use    Vaping Use: Never used   Substance Use Topics    Alcohol use: Yes     Alcohol/week: 0.0 standard drinks     Comment: Patient states, \"seldom. \"    Drug use: No       Review of Systems   Constitutional: Negative. Negative for fever. HENT:  Positive for congestion. Respiratory:  Positive for cough and chest tightness. Cardiovascular: Negative. Gastrointestinal: Negative. Musculoskeletal: Negative. All other systems reviewed and are negative. Objective   Patient-Reported Vitals  No data recorded     Physical Exam  Constitutional:       General: He is not in acute distress. Pulmonary:      Effort: Pulmonary effort is normal. No respiratory distress. Neurological:      Mental Status: He is alert. Mental status is at baseline. Psychiatric:         Mood and Affect: Mood normal.         Behavior: Behavior normal.         Thought Content: Thought content normal.         Judgment: Judgment normal.     Assessment & Plan   Below is the assessment and plan developed based on review of pertinent history, physical exam, labs, studies, and medications. 1. Bronchitis  -     predniSONE (DELTASONE) 20 MG tablet; Take 1 tablet by mouth 2 times daily for 5 days, Disp-10 tablet, R-0Normal  -     HYDROcodone-chlorpheniramine (TUSSIONEX PENNKINETIC ER) 10-8 MG/5ML SUER; Take 5 mLs by mouth every 12 hours as needed (cough) for up to 12 days.  Max Daily Amount: 10 mLs, Disp-120 mL, R-0Normal    - Declines COVID testing at this time    Return if symptoms worsen or fail to improve. On this date 3/8/2023 I have spent 11-20 minutes reviewing previous notes, test results and face to face (virtual) with the patient discussing the diagnosis and importance of compliance with the treatment plan as well as documenting on the day of the visit.            --Trudee Cabot, DO

## 2023-03-22 ENCOUNTER — PATIENT MESSAGE (OUTPATIENT)
Dept: FAMILY MEDICINE CLINIC | Age: 58
End: 2023-03-22

## 2023-03-23 RX ORDER — AZITHROMYCIN 250 MG/1
250 TABLET, FILM COATED ORAL SEE ADMIN INSTRUCTIONS
Qty: 6 TABLET | Refills: 0 | Status: SHIPPED | OUTPATIENT
Start: 2023-03-23 | End: 2023-03-28

## 2023-03-23 NOTE — TELEPHONE ENCOUNTER
From: Francisco Ceja  To: Dr. Geoffrey Borjas  Sent: 3/22/2023 4:33 PM EDT  Subject: Medicine     Can you call me something in my congestion in getting in my chest thank you

## 2023-04-19 ENCOUNTER — HOSPITAL ENCOUNTER (EMERGENCY)
Age: 58
Discharge: HOME | End: 2023-04-19
Payer: COMMERCIAL

## 2023-04-19 VITALS
DIASTOLIC BLOOD PRESSURE: 65 MMHG | TEMPERATURE: 96.3 F | OXYGEN SATURATION: 97 % | RESPIRATION RATE: 16 BRPM | SYSTOLIC BLOOD PRESSURE: 145 MMHG | HEART RATE: 87 BPM
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
## 2023-04-19 PROCEDURE — 99213 OFFICE O/P EST LOW 20 MIN: CPT

## 2023-04-19 PROCEDURE — 6360000002 HC RX W HCPCS: Performed by: NURSE PRACTITIONER

## 2023-04-19 PROCEDURE — 90471 IMMUNIZATION ADMIN: CPT | Performed by: NURSE PRACTITIONER

## 2023-04-19 PROCEDURE — 90715 TDAP VACCINE 7 YRS/> IM: CPT | Performed by: NURSE PRACTITIONER

## 2023-04-19 PROCEDURE — 99213 OFFICE O/P EST LOW 20 MIN: CPT | Performed by: NURSE PRACTITIONER

## 2023-04-19 RX ORDER — AMOXICILLIN AND CLAVULANATE POTASSIUM 875; 125 MG/1; MG/1
1 TABLET, FILM COATED ORAL 2 TIMES DAILY
Qty: 14 TABLET | Refills: 0 | Status: SHIPPED | OUTPATIENT
Start: 2023-04-19 | End: 2023-04-26

## 2023-04-19 RX ADMIN — TETANUS TOXOID, REDUCED DIPHTHERIA TOXOID AND ACELLULAR PERTUSSIS VACCINE, ADSORBED 0.5 ML: 5; 2.5; 8; 8; 2.5 SUSPENSION INTRAMUSCULAR at 08:20

## 2023-04-19 ASSESSMENT — PAIN - FUNCTIONAL ASSESSMENT: PAIN_FUNCTIONAL_ASSESSMENT: NONE - DENIES PAIN

## 2023-04-19 ASSESSMENT — ENCOUNTER SYMPTOMS
COUGH: 0
NAUSEA: 0
SHORTNESS OF BREATH: 0
VOMITING: 0

## 2023-04-19 NOTE — ED PROVIDER NOTES
Psychiatric:         Behavior: Behavior normal.       DIAGNOSTIC RESULTS     Labs:No results found for this visit on 04/19/23. IMAGING:    No orders to display         EKG:      URGENT CARE COURSE:     Vitals:    04/19/23 0808   BP: (!) 145/65   Pulse: 87   Resp: 16   Temp: (!) 96.3 °F (35.7 °C)   SpO2: 97%       Medications   Tetanus-Diphth-Acell Pertussis (BOOSTRIX) injection 0.5 mL (0.5 mLs IntraMUSCular Given 4/19/23 0820)            PROCEDURES:  None    FINAL IMPRESSION      1. Dog scratch          DISPOSITION/ PLAN     Patient's tetanus was updated in the urgent care today. A helistat dressing was placed with Coban over the wound to help with coagulation. Patient is advised to keep this in place to the afternoon and to follow-up in the emergency department if bleeding would return or with his PCP for any further issues. He will be placed on oral antibiotics for infection prevention and is agreeable with the plan as discussed.       PATIENT REFERRED TO:  [facility] [address] [facility] [address]      DISCHARGE MEDICATIONS:  New Prescriptions    AMOXICILLIN-CLAVULANATE (AUGMENTIN) 875-125 MG PER TABLET    Take 1 tablet by mouth 2 times daily for 7 days       Discontinued Medications    No medications on file       Current Discharge Medication List          KANIKA Renee CNP    (Please note that portions of this note were completed with a voice recognition program. Efforts were made to edit the dictations but occasionally words are mis-transcribed.)           KANIKA Renee CNP  04/19/23 0879

## 2023-04-19 NOTE — ED NOTES
To [facility] with complaints of his dog scratching his left forearm last night. States it wont stop bleeding. Pt on brilenta.  Small scratch for forearm     Jeremy Rangel RN  04/19/23 6663

## 2023-04-20 ENCOUNTER — PATIENT MESSAGE (OUTPATIENT)
Dept: FAMILY MEDICINE CLINIC | Age: 58
End: 2023-04-20

## 2023-06-10 ENCOUNTER — NURSE ONLY (OUTPATIENT)
Dept: LAB | Age: 58
End: 2023-06-10

## 2023-06-10 DIAGNOSIS — E04.2 MULTINODULAR THYROID: ICD-10-CM

## 2023-06-10 LAB
T3FREE SERPL-MCNC: 3.64 PG/ML (ref 2.02–4.43)
T4 FREE SERPL-MCNC: 1.11 NG/DL (ref 0.93–1.76)
TSH SERPL DL<=0.005 MIU/L-ACNC: 1.43 UIU/ML (ref 0.4–4.2)

## 2023-06-13 ENCOUNTER — HOSPITAL ENCOUNTER (EMERGENCY)
Age: 58
Discharge: HOME | End: 2023-06-13
Payer: COMMERCIAL

## 2023-06-13 ENCOUNTER — APPOINTMENT (OUTPATIENT)
Dept: GENERAL RADIOLOGY | Age: 58
End: 2023-06-13
Payer: COMMERCIAL

## 2023-06-13 VITALS
HEART RATE: 96 BPM | WEIGHT: 315 LBS | SYSTOLIC BLOOD PRESSURE: 135 MMHG | RESPIRATION RATE: 18 BRPM | DIASTOLIC BLOOD PRESSURE: 73 MMHG | BODY MASS INDEX: 46.58 KG/M2 | OXYGEN SATURATION: 97 % | TEMPERATURE: 98.1 F

## 2023-06-13 DIAGNOSIS — M25.461 KNEE EFFUSION, RIGHT: Primary | ICD-10-CM

## 2023-06-13 PROCEDURE — 73564 X-RAY EXAM KNEE 4 OR MORE: CPT

## 2023-06-13 PROCEDURE — 99213 OFFICE O/P EST LOW 20 MIN: CPT | Performed by: EMERGENCY MEDICINE

## 2023-06-13 PROCEDURE — 99213 OFFICE O/P EST LOW 20 MIN: CPT

## 2023-06-13 RX ORDER — NAPROXEN 500 MG/1
500 TABLET ORAL 2 TIMES DAILY WITH MEALS
Qty: 60 TABLET | Refills: 0 | Status: SHIPPED | OUTPATIENT
Start: 2023-06-13

## 2023-06-13 ASSESSMENT — PAIN SCALES - GENERAL: PAINLEVEL_OUTOF10: 6

## 2023-06-13 ASSESSMENT — PAIN DESCRIPTION - FREQUENCY: FREQUENCY: CONTINUOUS

## 2023-06-13 ASSESSMENT — PAIN DESCRIPTION - PAIN TYPE: TYPE: ACUTE PAIN

## 2023-06-13 ASSESSMENT — PAIN - FUNCTIONAL ASSESSMENT: PAIN_FUNCTIONAL_ASSESSMENT: 0-10

## 2023-06-13 ASSESSMENT — PAIN DESCRIPTION - ORIENTATION: ORIENTATION: RIGHT

## 2023-06-13 ASSESSMENT — PAIN DESCRIPTION - DESCRIPTORS: DESCRIPTORS: SHARP;ACHING

## 2023-06-13 ASSESSMENT — PAIN DESCRIPTION - LOCATION: LOCATION: KNEE

## 2023-06-13 NOTE — DISCHARGE INSTRUCTIONS
Ice to the area frequently    Elevate your leg as much and as frequently as possible    Naproxen as directed as needed for pain.   You may also use over-the-counter Tylenol 2 tablets every 6-8 hours as needed for pain additionally    Follow-up with your orthopedist as soon as possible for reevaluation especially if no improvement in 3 to 5 days

## 2023-06-13 NOTE — ED PROVIDER NOTES
[address]  Urgent Care Encounter       CHIEF COMPLAINT       Chief Complaint   Patient presents with    Knee Pain     Right         Nurses Notes reviewed and I agree except as noted in the HPI. HISTORY OF PRESENT ILLNESS   Joseph Rice is a 59 y.o. male who presents for complaints of right knee pain. Patient denies any injury. States started hurting when he woke up this morning. He states ambulating increases his pain. Bending his knee increases his pain. He currently rates his pain 6 on a 10 scale. Patient did take over-the-counter Aleve today with minimal improvement. The patient has had left total knee replacement in the past.  He states this is the first time he has had issues with his right knee. HPI    REVIEW OF SYSTEMS     Review of Systems   Constitutional:  Negative for activity change, fatigue and fever. Musculoskeletal:  Positive for arthralgias (right knee) and gait problem. PAST MEDICAL HISTORY         Diagnosis Date    Anxiety attacks     Anxiety due to family issues. Arthritis     Asthma     as child    Atypical chest pain     Cuevas esophagus     Chronic back pain     Coronary artery disease     S/P stent of the LAD by Dr. Hiram Sanchez on 2 23 2010. Diabetes mellitus, type 2 ([facility] [address]     Erectile dysfunction     Family history of premature CAD     Gastroesophageal reflux disease     Groin hematoma     Herniated disc 2011    back and neck    History of erectile dysfunction 2008    History of giardia infection     History of Giardia. History of iron deficiency     History of pulmonary embolus (PE) 1980's. Remote history of pulmonary embolus and negative computed tomography for pulmonary embolus. History of smoking     Hyperlipidemia     Well managed. Hypogonadism, male     The patient has central hypogonadism.      Joint pain     Morbid obesity     KIRAN (obstructive sleep apnea)     S/P angioplasty     S/P PTCA: 1/12/2015: FFR-guided stenting of

## 2023-06-13 NOTE — ED TRIAGE NOTES
Patient ambulated to room with c/o right knee pain beginning this morning, upon waking. Denies injury.

## 2023-06-19 RX ORDER — PANTOPRAZOLE SODIUM 40 MG/1
TABLET, DELAYED RELEASE ORAL
Qty: 180 TABLET | Refills: 0 | Status: SHIPPED | OUTPATIENT
Start: 2023-06-19

## 2023-07-05 RX ORDER — METOPROLOL SUCCINATE 25 MG/1
TABLET, EXTENDED RELEASE ORAL
Qty: 180 TABLET | Refills: 0 | Status: SHIPPED | OUTPATIENT
Start: 2023-07-05 | End: 2023-07-07 | Stop reason: SDUPTHER

## 2023-07-07 ENCOUNTER — OFFICE VISIT (OUTPATIENT)
Dept: CARDIOLOGY CLINIC | Age: 58
End: 2023-07-07
Payer: COMMERCIAL

## 2023-07-07 VITALS
HEART RATE: 75 BPM | SYSTOLIC BLOOD PRESSURE: 150 MMHG | DIASTOLIC BLOOD PRESSURE: 78 MMHG | BODY MASS INDEX: 42.66 KG/M2 | WEIGHT: 315 LBS | HEIGHT: 72 IN

## 2023-07-07 DIAGNOSIS — R06.02 SHORTNESS OF BREATH: ICD-10-CM

## 2023-07-07 DIAGNOSIS — I25.118 CORONARY ARTERY DISEASE OF NATIVE ARTERY OF NATIVE HEART WITH STABLE ANGINA PECTORIS (HCC): ICD-10-CM

## 2023-07-07 DIAGNOSIS — I10 PRIMARY HYPERTENSION: Primary | ICD-10-CM

## 2023-07-07 DIAGNOSIS — E78.01 FAMILIAL HYPERCHOLESTEROLEMIA: ICD-10-CM

## 2023-07-07 PROCEDURE — 3078F DIAST BP <80 MM HG: CPT | Performed by: NUCLEAR MEDICINE

## 2023-07-07 PROCEDURE — 99214 OFFICE O/P EST MOD 30 MIN: CPT | Performed by: NUCLEAR MEDICINE

## 2023-07-07 PROCEDURE — 3077F SYST BP >= 140 MM HG: CPT | Performed by: NUCLEAR MEDICINE

## 2023-07-07 RX ORDER — METOPROLOL SUCCINATE 25 MG/1
TABLET, EXTENDED RELEASE ORAL
Qty: 180 TABLET | Refills: 3 | Status: SHIPPED | OUTPATIENT
Start: 2023-07-07

## 2023-07-07 RX ORDER — ISOSORBIDE MONONITRATE 30 MG/1
30 TABLET, EXTENDED RELEASE ORAL DAILY
Qty: 30 TABLET | Refills: 0 | Status: SHIPPED | OUTPATIENT
Start: 2023-07-07

## 2023-07-07 RX ORDER — NITROGLYCERIN 0.4 MG/1
0.4 TABLET SUBLINGUAL EVERY 5 MIN PRN
COMMUNITY
End: 2023-07-07 | Stop reason: SDUPTHER

## 2023-07-07 RX ORDER — NITROGLYCERIN 0.4 MG/1
0.4 TABLET SUBLINGUAL EVERY 5 MIN PRN
Qty: 25 TABLET | Refills: 3 | Status: SHIPPED | OUTPATIENT
Start: 2023-07-07

## 2023-07-07 RX ORDER — ISOSORBIDE MONONITRATE 30 MG/1
30 TABLET, EXTENDED RELEASE ORAL DAILY
Qty: 90 TABLET | Refills: 3 | Status: SHIPPED | OUTPATIENT
Start: 2023-07-07

## 2023-07-07 RX ORDER — ATORVASTATIN CALCIUM 40 MG/1
40 TABLET, FILM COATED ORAL DAILY
Qty: 90 TABLET | Refills: 3 | Status: SHIPPED | OUTPATIENT
Start: 2023-07-07

## 2023-07-07 RX ORDER — LISINOPRIL 5 MG/1
5 TABLET ORAL DAILY
Qty: 90 TABLET | Refills: 3 | Status: SHIPPED | OUTPATIENT
Start: 2023-07-07

## 2023-07-07 NOTE — PATIENT INSTRUCTIONS
Start Imdur 30 mg daily. Script will be sent to pharmacy once signed by Dr Gerardo Avitia. The office will be calling you to schedule a heart cath.

## 2023-07-10 ENCOUNTER — PRE-PROCEDURE TELEPHONE (OUTPATIENT)
Dept: INPATIENT UNIT | Age: 58
End: 2023-07-10

## 2023-07-10 NOTE — PROGRESS NOTES
NPO after midnight - take am meds with sip of water. Hold Rybelsus am of procedure.   Bring drivers license and insurance information  Wear comfortable clean clothes  Shower morning of and night before with liquid antibacterial soap  Remove jewelry   May have to stay overnight if have PTCA/stent  Bring medications in original bottles  Made aware of visitors limit to 2 at a time  Follow all instructions given by your physician  Please notify doctor office if you need to cancel or reschedule your procedure   needed at discharge

## 2023-07-11 ENCOUNTER — PREP FOR PROCEDURE (OUTPATIENT)
Dept: CARDIOLOGY | Age: 58
End: 2023-07-11

## 2023-07-11 RX ORDER — SODIUM CHLORIDE 9 MG/ML
INJECTION, SOLUTION INTRAVENOUS PRN
Status: CANCELLED | OUTPATIENT
Start: 2023-07-11

## 2023-07-11 RX ORDER — SODIUM CHLORIDE 0.9 % (FLUSH) 0.9 %
5-40 SYRINGE (ML) INJECTION PRN
Status: CANCELLED | OUTPATIENT
Start: 2023-07-11

## 2023-07-11 RX ORDER — ASPIRIN 325 MG
325 TABLET ORAL ONCE
Status: CANCELLED | OUTPATIENT
Start: 2023-07-11 | End: 2023-07-11

## 2023-07-11 RX ORDER — SODIUM CHLORIDE 0.9 % (FLUSH) 0.9 %
5-40 SYRINGE (ML) INJECTION EVERY 12 HOURS SCHEDULED
Status: CANCELLED | OUTPATIENT
Start: 2023-07-11

## 2023-07-11 RX ORDER — SODIUM CHLORIDE 9 MG/ML
INJECTION, SOLUTION INTRAVENOUS CONTINUOUS
Status: CANCELLED | OUTPATIENT
Start: 2023-07-11

## 2023-07-11 RX ORDER — NITROGLYCERIN 0.4 MG/1
0.4 TABLET SUBLINGUAL EVERY 5 MIN PRN
Status: CANCELLED | OUTPATIENT
Start: 2023-07-11

## 2023-07-12 ENCOUNTER — HOSPITAL ENCOUNTER (OUTPATIENT)
Dept: INPATIENT UNIT | Age: 58
Discharge: HOME | End: 2023-07-12
Attending: NUCLEAR MEDICINE | Admitting: NUCLEAR MEDICINE
Payer: COMMERCIAL

## 2023-07-12 VITALS
HEART RATE: 79 BPM | HEIGHT: 72 IN | SYSTOLIC BLOOD PRESSURE: 117 MMHG | OXYGEN SATURATION: 95 % | BODY MASS INDEX: 42.66 KG/M2 | WEIGHT: 315 LBS | RESPIRATION RATE: 20 BRPM | DIASTOLIC BLOOD PRESSURE: 51 MMHG | TEMPERATURE: 98.8 F

## 2023-07-12 PROBLEM — I20.8 ANGINA AT REST (HCC): Status: ACTIVE | Noted: 2023-01-26

## 2023-07-12 PROBLEM — I20.89 ANGINA AT REST: Status: ACTIVE | Noted: 2023-01-26

## 2023-07-12 LAB
ABO: NORMAL
ANION GAP SERPL CALC-SCNC: 12 MEQ/L (ref 8–16)
ANTIBODY SCREEN: NORMAL
APTT PPP: 29.8 SECONDS (ref 22–38)
BUN SERPL-MCNC: 11 MG/DL (ref 7–22)
CALCIUM SERPL-MCNC: 8.6 MG/DL (ref 8.5–10.5)
CHLORIDE SERPL-SCNC: 107 MEQ/L (ref 98–111)
CHOLEST SERPL-MCNC: 113 MG/DL (ref 100–199)
CO2 SERPL-SCNC: 20 MEQ/L (ref 23–33)
CREAT SERPL-MCNC: 0.6 MG/DL (ref 0.4–1.2)
DEPRECATED RDW RBC AUTO: 45.8 FL (ref 35–45)
ERYTHROCYTE [DISTWIDTH] IN BLOOD BY AUTOMATED COUNT: 14.7 % (ref 11.5–14.5)
GFR SERPL CREATININE-BSD FRML MDRD: > 60 ML/MIN/1.73M2
GLUCOSE SERPL-MCNC: 106 MG/DL (ref 70–108)
HCT VFR BLD AUTO: 38 % (ref 42–52)
HDLC SERPL-MCNC: 40 MG/DL
HGB BLD-MCNC: 11.6 GM/DL (ref 14–18)
INR PPP: 1.02 (ref 0.85–1.13)
LDLC SERPL CALC-MCNC: 60 MG/DL
MCH RBC QN AUTO: 26.2 PG (ref 26–33)
MCHC RBC AUTO-ENTMCNC: 30.5 GM/DL (ref 32.2–35.5)
MCV RBC AUTO: 85.8 FL (ref 80–94)
PLATELET # BLD AUTO: 302 THOU/MM3 (ref 130–400)
PMV BLD AUTO: 9.4 FL (ref 9.4–12.4)
POTASSIUM SERPL-SCNC: 4 MEQ/L (ref 3.5–5.2)
RBC # BLD AUTO: 4.43 MILL/MM3 (ref 4.7–6.1)
RH FACTOR: NORMAL
SODIUM SERPL-SCNC: 139 MEQ/L (ref 135–145)
TRIGL SERPL-MCNC: 67 MG/DL (ref 0–199)
WBC # BLD AUTO: 7 THOU/MM3 (ref 4.8–10.8)

## 2023-07-12 PROCEDURE — 85610 PROTHROMBIN TIME: CPT

## 2023-07-12 PROCEDURE — 85730 THROMBOPLASTIN TIME PARTIAL: CPT

## 2023-07-12 PROCEDURE — 80061 LIPID PANEL: CPT

## 2023-07-12 PROCEDURE — 6360000002 HC RX W HCPCS

## 2023-07-12 PROCEDURE — 2500000003 HC RX 250 WO HCPCS

## 2023-07-12 PROCEDURE — 36415 COLL VENOUS BLD VENIPUNCTURE: CPT

## 2023-07-12 PROCEDURE — 2580000003 HC RX 258: Performed by: PHYSICIAN ASSISTANT

## 2023-07-12 PROCEDURE — 99152 MOD SED SAME PHYS/QHP 5/>YRS: CPT

## 2023-07-12 PROCEDURE — 86850 RBC ANTIBODY SCREEN: CPT

## 2023-07-12 PROCEDURE — 86901 BLOOD TYPING SEROLOGIC RH(D): CPT

## 2023-07-12 PROCEDURE — 6360000004 HC RX CONTRAST MEDICATION: Performed by: NUCLEAR MEDICINE

## 2023-07-12 PROCEDURE — 99153 MOD SED SAME PHYS/QHP EA: CPT

## 2023-07-12 PROCEDURE — 93005 ELECTROCARDIOGRAM TRACING: CPT | Performed by: PHYSICIAN ASSISTANT

## 2023-07-12 PROCEDURE — C1769 GUIDE WIRE: HCPCS

## 2023-07-12 PROCEDURE — C1894 INTRO/SHEATH, NON-LASER: HCPCS

## 2023-07-12 PROCEDURE — 80048 BASIC METABOLIC PNL TOTAL CA: CPT

## 2023-07-12 PROCEDURE — 85027 COMPLETE CBC AUTOMATED: CPT

## 2023-07-12 PROCEDURE — 86900 BLOOD TYPING SEROLOGIC ABO: CPT

## 2023-07-12 PROCEDURE — 93458 L HRT ARTERY/VENTRICLE ANGIO: CPT | Performed by: NUCLEAR MEDICINE

## 2023-07-12 PROCEDURE — 93458 L HRT ARTERY/VENTRICLE ANGIO: CPT

## 2023-07-12 RX ORDER — SODIUM CHLORIDE 0.9 % (FLUSH) 0.9 %
5-40 SYRINGE (ML) INJECTION PRN
Status: DISCONTINUED | OUTPATIENT
Start: 2023-07-12 | End: 2023-07-12 | Stop reason: HOSPADM

## 2023-07-12 RX ORDER — SODIUM CHLORIDE 0.9 % (FLUSH) 0.9 %
5-40 SYRINGE (ML) INJECTION EVERY 12 HOURS SCHEDULED
Status: DISCONTINUED | OUTPATIENT
Start: 2023-07-12 | End: 2023-07-12 | Stop reason: HOSPADM

## 2023-07-12 RX ORDER — ACETAMINOPHEN 325 MG/1
650 TABLET ORAL EVERY 4 HOURS PRN
Status: DISCONTINUED | OUTPATIENT
Start: 2023-07-12 | End: 2023-07-12 | Stop reason: HOSPADM

## 2023-07-12 RX ORDER — RANOLAZINE 500 MG/1
500 TABLET, EXTENDED RELEASE ORAL 2 TIMES DAILY
Qty: 180 TABLET | Refills: 1 | Status: SHIPPED | OUTPATIENT
Start: 2023-07-12

## 2023-07-12 RX ORDER — SODIUM CHLORIDE 0.9 % (FLUSH) 0.9 %
5-40 SYRINGE (ML) INJECTION PRN
Status: DISCONTINUED | OUTPATIENT
Start: 2023-07-12 | End: 2023-07-12 | Stop reason: SDUPTHER

## 2023-07-12 RX ORDER — SODIUM CHLORIDE 0.9 % (FLUSH) 0.9 %
5-40 SYRINGE (ML) INJECTION EVERY 12 HOURS SCHEDULED
Status: DISCONTINUED | OUTPATIENT
Start: 2023-07-12 | End: 2023-07-12 | Stop reason: SDUPTHER

## 2023-07-12 RX ORDER — RANOLAZINE 500 MG/1
500 TABLET, EXTENDED RELEASE ORAL 2 TIMES DAILY
Qty: 60 TABLET | Refills: 0 | Status: SHIPPED | OUTPATIENT
Start: 2023-07-12

## 2023-07-12 RX ORDER — SODIUM CHLORIDE 9 MG/ML
INJECTION, SOLUTION INTRAVENOUS CONTINUOUS
Status: DISCONTINUED | OUTPATIENT
Start: 2023-07-12 | End: 2023-07-12 | Stop reason: HOSPADM

## 2023-07-12 RX ORDER — ASPIRIN 325 MG
325 TABLET ORAL ONCE
Status: DISCONTINUED | OUTPATIENT
Start: 2023-07-12 | End: 2023-07-12 | Stop reason: HOSPADM

## 2023-07-12 RX ORDER — ATROPINE SULFATE 0.4 MG/ML
0.5 INJECTION, SOLUTION INTRAVENOUS
Status: DISCONTINUED | OUTPATIENT
Start: 2023-07-12 | End: 2023-07-12 | Stop reason: HOSPADM

## 2023-07-12 RX ORDER — RANOLAZINE 500 MG/1
500 TABLET, EXTENDED RELEASE ORAL 2 TIMES DAILY
Status: ON HOLD | COMMUNITY
End: 2023-07-12 | Stop reason: SDUPTHER

## 2023-07-12 RX ORDER — SODIUM CHLORIDE 9 MG/ML
INJECTION, SOLUTION INTRAVENOUS PRN
Status: DISCONTINUED | OUTPATIENT
Start: 2023-07-12 | End: 2023-07-12 | Stop reason: HOSPADM

## 2023-07-12 RX ORDER — SODIUM CHLORIDE 9 MG/ML
INJECTION, SOLUTION INTRAVENOUS PRN
Status: DISCONTINUED | OUTPATIENT
Start: 2023-07-12 | End: 2023-07-12 | Stop reason: SDUPTHER

## 2023-07-12 RX ORDER — SODIUM CHLORIDE 9 MG/ML
INJECTION, SOLUTION INTRAVENOUS CONTINUOUS
Status: DISCONTINUED | OUTPATIENT
Start: 2023-07-12 | End: 2023-07-12 | Stop reason: SDUPTHER

## 2023-07-12 RX ORDER — NITROGLYCERIN 0.4 MG/1
0.4 TABLET SUBLINGUAL EVERY 5 MIN PRN
Status: DISCONTINUED | OUTPATIENT
Start: 2023-07-12 | End: 2023-07-12 | Stop reason: HOSPADM

## 2023-07-12 RX ADMIN — SODIUM CHLORIDE: 9 INJECTION, SOLUTION INTRAVENOUS at 12:49

## 2023-07-12 RX ADMIN — IOPAMIDOL 70 ML: 755 INJECTION, SOLUTION INTRAVENOUS at 13:59

## 2023-07-12 NOTE — PROGRESS NOTES
1500Care taken over from [facility]. Radial site stable, no bleeding seen, site soft. Armboard continued with vascband. Patient instructed not to bend wrist, not to put pressure on wrist and not to lift  or twist with wrist. Patient voices understanding. Bandaid applied to site at 1622 and vascband removed, armboard continued, site stable. Patient instructed not to bend, twist, lift, push or pull with right wrist, voices understanding. [id number] Discharge instructions given, voices understanding. 1700 Up in room, activity tolerated well. [id number] discharge instructions redone,   Ranexa was added to meds, [facility], patient aware. Patient was instructed to not be alone for 24 hours, patient telling sister he does not want anyone to stay with him. Patient reinstructed our recommendations is to have someone with him for 24 hours, patient voices understanding, but sister states he is not going to follow this. 1718 Discharged per wheelchair, stable condition.

## 2023-07-12 NOTE — PLAN OF CARE
Problem: Chronic Conditions and Co-morbidities  Goal: Patient's chronic conditions and co-morbidity symptoms are monitored and maintained or improved  Outcome: Progressing     Problem: Discharge Planning  Goal: Discharge to home or other facility with appropriate resources  Outcome: Progressing  Flowsheets (Taken 7/12/2023 1242)  Discharge to home or other facility with appropriate resources:   Identify barriers to discharge with patient and caregiver   Identify discharge learning needs (meds, wound care, etc)   Refer to discharge planning if patient needs post-hospital services based on physician order or complex needs related to functional status, cognitive ability or social support system   Discharge home

## 2023-07-12 NOTE — PLAN OF CARE
Problem: Chronic Conditions and Co-morbidities  Goal: Patient's chronic conditions and co-morbidity symptoms are monitored and maintained or improved  7/12/2023 1743 by Carol Correa RN  Outcome: Completed  7/12/2023 1434 by Josue Francois RN  Outcome: Progressing     Problem: Discharge Planning  Goal: Discharge to home or other facility with appropriate resources  7/12/2023 1743 by Carol Correa RN  Outcome: Completed  7/12/2023 1434 by Josue Francois RN  Outcome: Progressing  Flowsheets (Taken 7/12/2023 1242)  Discharge to home or other facility with appropriate resources:   Identify barriers to discharge with patient and caregiver   Identify discharge learning needs (meds, wound care, etc)   Refer to discharge planning if patient needs post-hospital services based on physician order or complex needs related to functional status, cognitive ability or social support system

## 2023-07-12 NOTE — DISCHARGE INSTRUCTIONS
generally available right after the procedure. At that point, your doctor will discuss the findings and suggest appropriate treatment options. In some cases, the results can indicate an immediate need for surgery. Schedule a follow-up appointment as directed by your doctor. Call Your Doctor If Any of the Following Occurs   Monitor your recovery once you leave the hospital. If you have a problem, alert your doctor. If any of the following occur, call your doctor:   Signs of infection, including fever and chills   Redness, swelling, increasing pain, excessive bleeding, or any discharge from the catheter insertion site   Call 911 If Any of the Following Occurs   CALL 911  if you have symptoms including:   Drooping facial muscles   Changes in vision or speech   Difficulty walking or using your limbs   Change in sensation to affected leg/hand, including numbness, feeling cold, or change in color   Extreme sweating, nausea or vomiting   Dizziness or lightheadedness   Chest pain   Rapid, irregular heartbeat   Palpitations   Cough, shortness of breath, or difficulty breathing   Weakness or fainting   If you think you have an emergency,  CALL 911  . SEDATION/ANALGESIA INFORMATION/HOME GOING ADVICE    SEDATION / ANALGESIA INFORMATION / HOME GOING ADVICE  You have received the sedation/analgesia medication during your visit     Sedation/analgesia is used during short medical procedures under controlled supervision. The medication will produce a strong relaxation. You will be able to hear, speak and follow instructions, but your memory and alertness will be decreased. You will be able to swallow and breathe on your own. During sedation/analgesia your blood pressure, heart and breathing will be watched closely. After the procedure, you may not remember what was said or done. You may have the following effects from the medication. \"           Drowsiness, dizziness, sleepiness or confusion.   \"

## 2023-07-12 NOTE — PROGRESS NOTES
1153  Pt admitted to  [address] [facility] for cardiac cath. Pt NPO. Patient accompanied by his sister. Vital signs obtained. Assessment and data collection initiated. Oriented to room. Policies and procedures for 2E explained   All questions answered with no further questions at this time. Fall prevention and safety precautions discussed with patient. 1311  Pt to cath lab per bed  1405  Pt ret'd to 2E11 post cardiac cath. Right radial site with vasc band and armboard. Pt denies any pain, numbness or tingling.  IV 0.9NS cont'd. Pts sister at bedside.

## 2023-07-12 NOTE — H&P
[address]  Sedation/Analgesia History & Physical    Pt Name: Nara Fan  Account number: [de-identified]  MRN: [id number]  YOB: 1965  Provider Performing Procedure: Zenaida Short MD MD [facility]  Primary Care Physician: Holly Pringle DO  Date: 7/12/2023    PRE-PROCEDURE    Code Status: FULL CODE  Brief History/Pre-Procedure Diagnosis:   Angina  CAD      Consent: : I have discussed with the patient risks, benefits, and alternatives (and relevant risks, benefits, and side effects related to alternatives or not receiving care), and likelihood of the success. The patient and/or representative appear to understand and agree to proceed. The discussion encompasses risks, benefits, and side effects related to the alternatives and the risks related to not receiving the proposed care, treatment, and services. MEDICAL HISTORY  [x]ASHD/ANGINA/MI/CHF   [x]Hypertension  []Diabetes  []Hyperlipidemia  []Smoking  []Family Hx of ASHD  []Additional information:       has a past medical history of Anxiety attacks, Arthritis, Asthma, Atypical chest pain, Cuevas esophagus, Chronic back pain, Coronary artery disease, Diabetes mellitus, type 2 ([address]), Erectile dysfunction, Family history of premature CAD, Gastroesophageal reflux disease, Groin hematoma, Herniated disc, History of erectile dysfunction, History of giardia infection, History of iron deficiency, History of pulmonary embolus (PE), History of smoking, Hyperlipidemia, Hypogonadism, male, Joint pain, Morbid obesity, KIRAN (obstructive sleep apnea), S/P angioplasty, S/P PTCA: 1/12/2015: FFR-guided stenting of distal and mid-LAD using Xience Alpine 2.5X15, post-2.77, and Alpine 2.75X18 mm, post-3.18 mm. , Testosterone deficiency, and Thyroid nodule. SURGICAL HISTORY   has a past surgical history that includes back surgery; Carpal tunnel release (1980); Elbow surgery (1980); knee surgery (1980);  Appendectomy; Coronary angioplasty;

## 2023-07-12 NOTE — TELEPHONE ENCOUNTER
Received VO from Dr. Janes Muhammad wanting to start pt on Ranexa 500mg BID. Spoke with RN on 2E notifying her of new start and also spoke with patient's sister Micah Lam notifying her of new Rx.     Dr. Janes Muhammad please agree with VO

## 2023-07-12 NOTE — PLAN OF CARE
Problem: Chronic Conditions and Co-morbidities  Goal: Patient's chronic conditions and co-morbidity symptoms are monitored and maintained or improved  Outcome: Progressing     Problem: Discharge Planning  Goal: Discharge to home or other facility with appropriate resources  Outcome: Progressing  Flowsheets (Taken 7/12/2023 1242)  Discharge to home or other facility with appropriate resources:   Identify barriers to discharge with patient and caregiver   Identify discharge learning needs (meds, wound care, etc)   Refer to discharge planning if patient needs post-hospital services based on physician order or complex needs related to functional status, cognitive ability or social support system   Care plan reviewed with patient and his sister. Patient and sister verbalize understanding of the plan of care and contribute to goal setting.

## 2023-07-12 NOTE — PROCEDURES
[place] [address]                            CARDIAC CATHETERIZATION    PATIENT NAME: Betsy Ravi                   :        1965  MED REC NO:   [id number]                           ROOM:       0011  ACCOUNT NO:   [de-identified]                           ADMIT DATE: 2023  PROVIDER:     [facility] [place]:  2023    CLINICAL HISTORY AND INDICATION:  This is a patient with angina, has had  multiple stents in the LAD, referred for recurrent symptoms, last stent  in the LAD in 2023. Due to his recurrent symptoms and not refractory  to treatment, he was referred for cardiac cath to evaluate coronary  anatomy. PROCEDURES PERFORMED:  1. Left heart cath with left ventriculogram.  2.  Coronary angiogram, right and left. 3.  Sedation, 2 of Versed and 50 of fentanyl between 02:00 and 02:30  p.m. in my presence under my supervision. BLOOD LOSS:  Less than 10 mL. PROCEDURE DETAILS:  Please refer to my catheterization detailed note. HEMODYNAMIC RESULTS AND LEFT VENTRICULOGRAM:  Left ventricular  end-diastolic pressure was 12 mmHg. No significant change before and  after contrast injection. No significant gradient across the aortic  valve to signify aortic stenosis. Left ventricular function was  globally within normal limits. EF 55%. CORONARY ARTERIOGRAM RESULTS:  1. Left main is patent, gives rise to left anterior descending and left  circumflex artery. 2.  Left anterior descending artery has multiple layers of stents, has  diffuse narrowing in the mid to distal segment of the artery, which  looks not much different than after his last angioplasty, probably about  50% diffusely diseased. No significant critical stenosis. 3.  Left circumflex artery is large, dominant, and patent. 4.  Right coronary artery is small, nondominant, and patent. CONCLUSIONS:  1.   Restenosis

## 2023-07-13 ENCOUNTER — TELEPHONE (OUTPATIENT)
Dept: FAMILY MEDICINE CLINIC | Age: 58
End: 2023-07-13

## 2023-07-13 NOTE — TELEPHONE ENCOUNTER
Care Transitions Initial Follow Up Call    Outreach made within 2 business days of discharge: Yes    Patient: Mecca Curiel Patient : 1965   MRN: [id number]  Reason for Admission: There are no discharge diagnoses documented for the most recent discharge. Discharge Date: 23       Spoke with: patient    Discharge department/facility: [facility]    TCM Interactive Patient Contact:  Was patient able to fill all prescriptions: Yes  Was patient instructed to bring all medications to the follow-up visit: Yes  Is patient taking all medications as directed in the discharge summary?  Yes  Does patient understand their discharge instructions: Yes  Does patient have questions or concerns that need addressed prior to 7-14 day follow up office visit: no    Schedule appointment with PCP within 7-14 days patient declined he is going to follow up with cardiology     Follow Up  Future Appointments   Date Time Provider [address] [place] [address]   2023  2:20 PM Isak Murphy, APRN - [facility]   2023  1:00 PM Karen Leiva PA-C [facility] [address] [place] [address]   2023 11:00 AM Katja Edmondson MD [facility]       Cristi Bond CMA ([address] [facility])

## 2023-07-15 LAB
EKG ATRIAL RATE: 71 BPM
EKG P AXIS: 37 DEGREES
EKG P-R INTERVAL: 154 MS
EKG Q-T INTERVAL: 416 MS
EKG QRS DURATION: 96 MS
EKG QTC CALCULATION (BAZETT): 452 MS
EKG R AXIS: 29 DEGREES
EKG T AXIS: 8 DEGREES
EKG VENTRICULAR RATE: 71 BPM

## 2023-07-15 PROCEDURE — 93010 ELECTROCARDIOGRAM REPORT: CPT | Performed by: INTERNAL MEDICINE

## 2023-08-08 ENCOUNTER — TELEPHONE (OUTPATIENT)
Dept: FAMILY MEDICINE CLINIC | Age: 58
End: 2023-08-08

## 2023-08-08 NOTE — TELEPHONE ENCOUNTER
----- Message from Maryse Stephaniedora sent at 8/8/2023 12:37 PM EDT -----  Subject: Message to Provider    QUESTIONS  Information for Provider? Patient is wanting to have a few warts removed   on his left hand. Is Dr. Pk Corcoran able to do this? please call to schedule   ---------------------------------------------------------------------------  --------------  [facility] INFO  [phone]; OK to leave message on voicemail  ---------------------------------------------------------------------------  --------------  SCRIPT ANSWERS  Relationship to Patient? Self  (Is the patient requesting to see the provider for a procedure?)?  Yes

## 2023-08-08 NOTE — TELEPHONE ENCOUNTER
Spoke with pt and a 30min appt was scheduled this Friday 8/11/23. Pt says he is off this day and it's the only day he can do it.

## 2023-08-11 ENCOUNTER — OFFICE VISIT (OUTPATIENT)
Dept: FAMILY MEDICINE CLINIC | Age: 58
End: 2023-08-11

## 2023-08-11 VITALS
BODY MASS INDEX: 42.66 KG/M2 | DIASTOLIC BLOOD PRESSURE: 60 MMHG | WEIGHT: 315 LBS | HEART RATE: 88 BPM | HEIGHT: 72 IN | RESPIRATION RATE: 18 BRPM | SYSTOLIC BLOOD PRESSURE: 108 MMHG

## 2023-08-11 DIAGNOSIS — B07.9 VIRAL WARTS, UNSPECIFIED TYPE: Primary | ICD-10-CM

## 2023-08-11 PROBLEM — M20.5X1 HALLUX LIMITUS, RIGHT: Status: ACTIVE | Noted: 2023-08-11

## 2023-08-11 PROBLEM — M67.371: Status: ACTIVE | Noted: 2023-08-11

## 2023-08-11 PROBLEM — M20.41 HAMMER TOE OF RIGHT FOOT: Status: ACTIVE | Noted: 2023-08-11

## 2023-08-12 ASSESSMENT — ENCOUNTER SYMPTOMS
GASTROINTESTINAL NEGATIVE: 1
RESPIRATORY NEGATIVE: 1

## 2023-08-12 NOTE — PROGRESS NOTES
Mandy Ojeda (:  1965) is a 59 y.o. male,Established patient, here for evaluation of the following chief complaint(s): Other (Warts on left hand )          Subjective   SUBJECTIVE/OBJECTIVE:  HPI  Chief Complaint   Patient presents with    Other     Warts on left hand      Pt presents with 2 warts to his left hand. Has been there for several months. No OTC treatment to date. Patient Active Problem List   Diagnosis    Asthma    Gastroesophageal reflux disease    Hyperlipidemia    History of smoking    Chronic back pain    S/P angioplasty    Groin hematoma    Testosterone deficiency    Joint pain    Arthritis    Atypical chest pain    Family history of premature CAD    Hypogonadism, male    Thyroid nodule    History of erectile dysfunction    Male sexual dysfunction    Heart disease    Chest pain with moderate risk of acute coronary syndrome    Coronary atherosclerosis    S/P PTCA: 2015: FFR-guided stenting of distal and mid-LAD using Xience Alpine 2.5X15, post-2.77, and Alpine 2.75X18 mm, post-3.18 mm. Obesity (BMI 30-39. 9)    Obstructive sleep apnea treated with BiPAP    COVID-19    Angina at rest [facility])    Unstable angina (HCC)    Transient synovitis of right ankle    Hallux limitus, right    Hammer toe of right foot       Current Outpatient Medications   Medication Sig Dispense Refill    ranolazine (RANEXA) 500 MG extended release tablet Take 1 tablet by mouth 2 times daily 60 tablet 0    metoprolol succinate (TOPROL XL) 25 MG extended release tablet TAKE 1 TABLET BY MOUTH  TWICE DAILY 180 tablet 3    atorvastatin (LIPITOR) 40 MG tablet Take 1 tablet by mouth daily 90 tablet 3    ticagrelor (BRILINTA) 90 MG TABS tablet Take 1 tablet by mouth 2 times daily 180 tablet 3    lisinopril (PRINIVIL;ZESTRIL) 5 MG tablet Take 1 tablet by mouth daily 90 tablet 3    nitroGLYCERIN (NITROSTAT) 0.4 MG SL tablet Place 1 tablet under the tongue every 5 minutes as needed for Chest pain up to max of 3

## 2023-08-16 ENCOUNTER — OFFICE VISIT (OUTPATIENT)
Dept: PULMONOLOGY | Age: 58
End: 2023-08-16
Payer: COMMERCIAL

## 2023-08-16 VITALS
DIASTOLIC BLOOD PRESSURE: 66 MMHG | WEIGHT: 315 LBS | HEART RATE: 88 BPM | HEIGHT: 72 IN | BODY MASS INDEX: 42.66 KG/M2 | OXYGEN SATURATION: 98 % | TEMPERATURE: 99.6 F | SYSTOLIC BLOOD PRESSURE: 112 MMHG

## 2023-08-16 DIAGNOSIS — G47.33 OBSTRUCTIVE SLEEP APNEA TREATED WITH BIPAP: Primary | ICD-10-CM

## 2023-08-16 DIAGNOSIS — E66.01 MORBIDLY OBESE (HCC): ICD-10-CM

## 2023-08-16 PROCEDURE — 99214 OFFICE O/P EST MOD 30 MIN: CPT | Performed by: NURSE PRACTITIONER

## 2023-08-16 ASSESSMENT — ENCOUNTER SYMPTOMS
CHEST TIGHTNESS: 0
GASTROINTESTINAL NEGATIVE: 1
DIARRHEA: 0
ALLERGIC/IMMUNOLOGIC NEGATIVE: 1
EYES NEGATIVE: 1
RESPIRATORY NEGATIVE: 1
NAUSEA: 0
VOMITING: 0
WHEEZING: 0
STRIDOR: 0

## 2023-08-16 NOTE — PROGRESS NOTES
[facility]      Duy Champion         [phone]  8/16/2023   Chief Complaint   Patient presents with    Follow-up     1 yr dre        Pt of Dr. Evon Snell    PAP Download:   Original or initial AHI: 16.8     Date of initial study: 7.13.12      Compliant  97%     Noncompliant 0 %     PAP Type spont  Level  15/11 cmH2O  Avg Hrs/Day 8 hr 25min  AHI: 0.5   Recorded compliance dates: 7.16.23-8.14.23   Machine/Mfg:   [x] ResMed    [] Respironics/Dreamstation   Interface:   [] Nasal    [] Nasal pillows   [x] FFM      Provider:      [x] [facility]     [][facility]     [] [employer]    [] [employer]    [] [facility]               [] [facility]      [] [employer]    [] [address]:      [] Other    Neck Size: 17  Mallampati 4  ESS:  0  SAQLI: 93    Here is a scan of the most recent download:                Presentation:   Isiah Alston presents for sleep medicine follow up for obstructive sleep apnea  Since the last visit, Isiah Alston has been compliant with his BiPAP therapy and continues to see benefit from its use. Awake and alert today in the office   MO BMI 46  No sleep medication use at night   No sleep issues or concerns today     Weight stable / unchanged    Equipment issues: The pressure is  acceptable, the mask is acceptable     Sleep issues:  Do you feel better? Yes  More rested? Yes   Better concentration? yes  Difficulty falling sleep? No  Difficulty staying asleep? No  Snoring? No    Progress History:   Since last visit any new medical issues? No  New ER or hospital visits? No  Any new or changes in medicines? No  Any new sleep medicines? No    Review of Systems -   Review of Systems   Constitutional: Negative. Negative for chills, fever and unexpected weight change. HENT: Negative. Eyes: Negative. Respiratory: Negative. Negative for chest tightness, wheezing and stridor. Cardiovascular:  Negative for chest pain and leg swelling. Gastrointestinal: Negative.   Negative for

## 2023-09-08 ENCOUNTER — OFFICE VISIT (OUTPATIENT)
Dept: FAMILY MEDICINE CLINIC | Age: 58
End: 2023-09-08

## 2023-09-08 VITALS
BODY MASS INDEX: 47.03 KG/M2 | WEIGHT: 315 LBS | RESPIRATION RATE: 16 BRPM | SYSTOLIC BLOOD PRESSURE: 118 MMHG | DIASTOLIC BLOOD PRESSURE: 70 MMHG | HEART RATE: 68 BPM

## 2023-09-08 DIAGNOSIS — E11.9 CONTROLLED TYPE 2 DIABETES MELLITUS WITHOUT COMPLICATION, WITHOUT LONG-TERM CURRENT USE OF INSULIN (HCC): ICD-10-CM

## 2023-09-08 DIAGNOSIS — B07.9 VIRAL WARTS, UNSPECIFIED TYPE: Primary | ICD-10-CM

## 2023-09-08 RX ORDER — TIRZEPATIDE 5 MG/.5ML
5 INJECTION, SOLUTION SUBCUTANEOUS WEEKLY
Qty: 2 ML | Refills: 0 | Status: SHIPPED | OUTPATIENT
Start: 2023-09-08

## 2023-09-08 ASSESSMENT — ENCOUNTER SYMPTOMS
RESPIRATORY NEGATIVE: 1
GASTROINTESTINAL NEGATIVE: 1

## 2023-09-12 ENCOUNTER — TELEPHONE (OUTPATIENT)
Dept: FAMILY MEDICINE CLINIC | Age: 58
End: 2023-09-12

## 2023-09-12 RX ORDER — PANTOPRAZOLE SODIUM 40 MG/1
TABLET, DELAYED RELEASE ORAL
Qty: 180 TABLET | Refills: 0 | Status: SHIPPED | OUTPATIENT
Start: 2023-09-12

## 2023-09-12 NOTE — TELEPHONE ENCOUNTER
PA request received from pharmacy for [facility] 5mg/0.5ml #2ml for 28 days. PA submitted online at [employer] and approved      Your request has been approved  PA Case: [id number], Status: Approved, Coverage Starts on: 9/12/2023 12:00:00 AM, Coverage Ends on: 9/11/2024 12:00:00 AM.    Pharmacy notified via VM

## 2023-09-13 ENCOUNTER — OFFICE VISIT (OUTPATIENT)
Dept: CARDIOLOGY CLINIC | Age: 58
End: 2023-09-13
Payer: COMMERCIAL

## 2023-09-13 ENCOUNTER — TELEPHONE (OUTPATIENT)
Dept: CARDIOLOGY CLINIC | Age: 58
End: 2023-09-13

## 2023-09-13 VITALS
BODY MASS INDEX: 42.66 KG/M2 | HEART RATE: 81 BPM | WEIGHT: 315 LBS | SYSTOLIC BLOOD PRESSURE: 121 MMHG | HEIGHT: 72 IN | DIASTOLIC BLOOD PRESSURE: 65 MMHG

## 2023-09-13 DIAGNOSIS — I25.10 ATHEROSCLEROSIS OF NATIVE CORONARY ARTERY OF NATIVE HEART WITHOUT ANGINA PECTORIS: Primary | ICD-10-CM

## 2023-09-13 DIAGNOSIS — E78.5 DYSLIPIDEMIA: ICD-10-CM

## 2023-09-13 DIAGNOSIS — I10 ESSENTIAL HYPERTENSION: ICD-10-CM

## 2023-09-13 PROCEDURE — 99214 OFFICE O/P EST MOD 30 MIN: CPT | Performed by: STUDENT IN AN ORGANIZED HEALTH CARE EDUCATION/TRAINING PROGRAM

## 2023-09-13 PROCEDURE — 3078F DIAST BP <80 MM HG: CPT | Performed by: STUDENT IN AN ORGANIZED HEALTH CARE EDUCATION/TRAINING PROGRAM

## 2023-09-13 PROCEDURE — 3074F SYST BP LT 130 MM HG: CPT | Performed by: STUDENT IN AN ORGANIZED HEALTH CARE EDUCATION/TRAINING PROGRAM

## 2023-09-13 NOTE — PROGRESS NOTES
nourished  Lungs:   Clear to auscultation, no rales  Heart:    RRR, Normal S1 S2, No murmur, rubs, or gallops  Abdomen:   Soft, non tender, no organomegalies, positive bowel sounds  Extremities:   No edema, no cyanosis, good peripheral pulses  Neurological:   Awake, alert, oriented. No obvious focal deficits  Musculoskeletal:  No obvious deformities    EKG:          Diagnosis Orders   1. Atherosclerosis of native coronary artery of native heart without angina pectoris        2. Essential hypertension        3. Dyslipidemia            No orders of the defined types were placed in this encounter. Assessment/Plan:   Cad s/p prior PCI 01/2023--non obs cad on cath-stable. Continue GDMT. Dr Keya Crain to ken for thyroid procedure. HTN-well controlled. No chnages to current meds. HLD-continiue lipitor for CAD risk reduction.    Disposition:     Follow up with Dr Keya Crain as scheduled or sooner if needed

## 2023-09-13 NOTE — TELEPHONE ENCOUNTER
Okay.any time clear  Hold thinners 5 days before Eliptical Excision Additional Text (Leave Blank If You Do Not Want): The margin was drawn around the clinically apparent lesion.  An elliptical shape was then drawn on the skin incorporating the lesion and margins.  Incisions were then made along these lines to the appropriate tissue plane and the lesion was extirpated.

## 2023-09-13 NOTE — TELEPHONE ENCOUNTER
Patient is getting his thyroid removed and needs clearance. Sees Dr. Mauro Jacobo on 9/21/2023 and will decide then when the surgery is.      Had stents placed 1/28/2023   Stress: 10/4/2018  Cath: 7/12/2023

## 2023-09-21 ENCOUNTER — NURSE ONLY (OUTPATIENT)
Dept: LAB | Age: 58
End: 2023-09-21

## 2023-09-21 DIAGNOSIS — E04.2 MULTINODULAR THYROID: ICD-10-CM

## 2023-09-21 LAB
T3FREE SERPL-MCNC: 3.31 PG/ML (ref 2.02–4.43)
T4 FREE SERPL-MCNC: 1.13 NG/DL (ref 0.93–1.76)
TSH SERPL DL<=0.005 MIU/L-ACNC: 1.18 UIU/ML (ref 0.4–4.2)

## 2023-10-02 RX ORDER — TIRZEPATIDE 7.5 MG/.5ML
7.5 INJECTION, SOLUTION SUBCUTANEOUS WEEKLY
Qty: 2 ML | Refills: 0 | Status: SHIPPED | OUTPATIENT
Start: 2023-10-02

## 2023-10-05 ENCOUNTER — HOSPITAL ENCOUNTER (OUTPATIENT)
Dept: ULTRASOUND IMAGING | Age: 58
Discharge: HOME | End: 2023-10-05
Payer: COMMERCIAL

## 2023-10-05 DIAGNOSIS — E04.2 MULTINODULAR THYROID: ICD-10-CM

## 2023-10-05 PROCEDURE — 76942 ECHO GUIDE FOR BIOPSY: CPT

## 2023-10-05 NOTE — OP NOTE
Department of Radiology  Post Procedure Progress Note      Pre-Procedure Diagnosis:  thyroid nodules      Procedure Performed:  FNA biopsy left thyroid nodule , lower pole    Anesthesia: local     Findings: successful    Immediate Complications:  None    Estimated Blood Loss: minimal    SEE DICTATED PROCEDURE NOTE FOR COMPLETE DETAILS.     Xi Tai MD   10/5/2023 2:16 PM

## 2023-10-05 NOTE — H&P
Formulation and discussion of sedation / procedure plans, risks, benefits, side effects and alternatives with patient and/or responsible adult completed. History and Physical reviewed and unchanged.     Electronically signed by Natalee Carreon MD on 10/5/23 at 2:16 PM EDT

## 2023-10-20 NOTE — PROGRESS NOTES
Follow all instructions given by your physician  NPO after midnight  Sips of water am of surgery with allowed medications  Bring insurance info and 's license  Wear clean comfortable , loose-fitting clothing  No jewelry or contact lenses to be worn day of surgery  No glue on dentures morning of surgery; you will be asked to remove them for surgery. Case for glasses. Shower the night before and the morning of surgery with a liquid antibacterial soap, dry with new fresh clean towel after each shower, no lotions, creams or powder. Clean sheets and pillowcase on bed night before surgery  Bring medications in original bottles  Bring CPAP/BIPAP machine if you have one ( you may be charged if one is needed in recovery room )    Our pharmacy has a Meds to [facility] program where they will deliver any new prescriptions you may have to your room before you leave. Our Pharmacy will clear it through your insurance; for example (same co pay). This enables you to take your new RX as soon as you need when you get home and avoids stop/wait delays on the way home. Please have a form of payment with you and have someone designated as your Pharmacy contact with their phone # as you may not feel well or still be under the influence of anesthesia. Please refer to the SSI-Surgical Site Infection Flyer you hopefully received in the mail-together we can prevent infections; signs and symptoms reviewed. When discharged be sure you understand how to care for your wound and that you have the Dr./office phone # to call if you have any concerns or questions about your wound.      needed at discharge and someone over 18 to stay with you for 24 hours overnight (surgery may be cancelled if you don't have this)  Report to [facility] on 2nd floor  If you would become ill prior to surgery, please call the surgeon  May have a visitor with you, we request that you limit to 2 visitors in pre-op area  Masks are recommended but not required, new

## 2023-10-27 ENCOUNTER — HOSPITAL ENCOUNTER (OUTPATIENT)
Age: 58
Setting detail: OBSERVATION
Discharge: HOME | End: 2023-10-30
Attending: SURGERY | Admitting: SURGERY
Payer: COMMERCIAL

## 2023-10-27 ENCOUNTER — ANESTHESIA EVENT (OUTPATIENT)
Dept: OPERATING ROOM | Age: 58
End: 2023-10-27
Payer: COMMERCIAL

## 2023-10-27 ENCOUNTER — ANESTHESIA (OUTPATIENT)
Dept: OPERATING ROOM | Age: 58
End: 2023-10-27
Payer: COMMERCIAL

## 2023-10-27 DIAGNOSIS — E04.2 MULTINODULAR GOITER: Primary | ICD-10-CM

## 2023-10-27 LAB
CA-I BLD ISE-SCNC: 1.07 MMOL/L (ref 1.12–1.32)
CA-I BLD ISE-SCNC: 1.14 MMOL/L (ref 1.12–1.32)
GLUCOSE BLD STRIP.AUTO-MCNC: 88 MG/DL (ref 70–108)

## 2023-10-27 PROCEDURE — 7100000000 HC PACU RECOVERY - FIRST 15 MIN: Performed by: SURGERY

## 2023-10-27 PROCEDURE — 2580000003 HC RX 258: Performed by: SURGERY

## 2023-10-27 PROCEDURE — G0378 HOSPITAL OBSERVATION PER HR: HCPCS

## 2023-10-27 PROCEDURE — 3700000000 HC ANESTHESIA ATTENDED CARE: Performed by: SURGERY

## 2023-10-27 PROCEDURE — 82330 ASSAY OF CALCIUM: CPT

## 2023-10-27 PROCEDURE — 6360000002 HC RX W HCPCS: Performed by: NURSE ANESTHETIST, CERTIFIED REGISTERED

## 2023-10-27 PROCEDURE — 6370000000 HC RX 637 (ALT 250 FOR IP): Performed by: SURGERY

## 2023-10-27 PROCEDURE — 2500000003 HC RX 250 WO HCPCS: Performed by: NURSE ANESTHETIST, CERTIFIED REGISTERED

## 2023-10-27 PROCEDURE — 7100000010 HC PHASE II RECOVERY - FIRST 15 MIN: Performed by: SURGERY

## 2023-10-27 PROCEDURE — 82948 REAGENT STRIP/BLOOD GLUCOSE: CPT

## 2023-10-27 PROCEDURE — 3600000003 HC SURGERY LEVEL 3 BASE: Performed by: SURGERY

## 2023-10-27 PROCEDURE — 6360000002 HC RX W HCPCS: Performed by: SURGERY

## 2023-10-27 PROCEDURE — 36415 COLL VENOUS BLD VENIPUNCTURE: CPT

## 2023-10-27 PROCEDURE — 7100000011 HC PHASE II RECOVERY - ADDTL 15 MIN: Performed by: SURGERY

## 2023-10-27 PROCEDURE — 1200000000 HC SEMI PRIVATE

## 2023-10-27 PROCEDURE — 2500000003 HC RX 250 WO HCPCS: Performed by: ANESTHESIOLOGY

## 2023-10-27 PROCEDURE — 2720000010 HC SURG SUPPLY STERILE: Performed by: SURGERY

## 2023-10-27 PROCEDURE — 7100000001 HC PACU RECOVERY - ADDTL 15 MIN: Performed by: SURGERY

## 2023-10-27 PROCEDURE — 88307 TISSUE EXAM BY PATHOLOGIST: CPT

## 2023-10-27 PROCEDURE — 3600000013 HC SURGERY LEVEL 3 ADDTL 15MIN: Performed by: SURGERY

## 2023-10-27 PROCEDURE — 2500000003 HC RX 250 WO HCPCS

## 2023-10-27 PROCEDURE — 3700000001 HC ADD 15 MINUTES (ANESTHESIA): Performed by: SURGERY

## 2023-10-27 PROCEDURE — 2709999900 HC NON-CHARGEABLE SUPPLY: Performed by: SURGERY

## 2023-10-27 RX ORDER — HYDROMORPHONE HYDROCHLORIDE 2 MG/ML
INJECTION, SOLUTION INTRAMUSCULAR; INTRAVENOUS; SUBCUTANEOUS PRN
Status: DISCONTINUED | OUTPATIENT
Start: 2023-10-27 | End: 2023-10-27 | Stop reason: SDUPTHER

## 2023-10-27 RX ORDER — PROPOFOL 10 MG/ML
INJECTION, EMULSION INTRAVENOUS PRN
Status: DISCONTINUED | OUTPATIENT
Start: 2023-10-27 | End: 2023-10-27 | Stop reason: SDUPTHER

## 2023-10-27 RX ORDER — LIDOCAINE HCL/PF 100 MG/5ML
SYRINGE (ML) INJECTION PRN
Status: DISCONTINUED | OUTPATIENT
Start: 2023-10-27 | End: 2023-10-27 | Stop reason: SDUPTHER

## 2023-10-27 RX ORDER — MIDAZOLAM HYDROCHLORIDE 1 MG/ML
INJECTION INTRAMUSCULAR; INTRAVENOUS PRN
Status: DISCONTINUED | OUTPATIENT
Start: 2023-10-27 | End: 2023-10-27 | Stop reason: SDUPTHER

## 2023-10-27 RX ORDER — ATORVASTATIN CALCIUM 40 MG/1
40 TABLET, FILM COATED ORAL DAILY
Status: DISCONTINUED | OUTPATIENT
Start: 2023-10-27 | End: 2023-10-30 | Stop reason: HOSPADM

## 2023-10-27 RX ORDER — SODIUM CHLORIDE 9 MG/ML
INJECTION, SOLUTION INTRAVENOUS CONTINUOUS
Status: DISCONTINUED | OUTPATIENT
Start: 2023-10-27 | End: 2023-10-29

## 2023-10-27 RX ORDER — OXYCODONE HYDROCHLORIDE 5 MG/1
5 TABLET ORAL EVERY 4 HOURS PRN
Status: DISCONTINUED | OUTPATIENT
Start: 2023-10-27 | End: 2023-10-30 | Stop reason: HOSPADM

## 2023-10-27 RX ORDER — DEXAMETHASONE SODIUM PHOSPHATE 10 MG/ML
INJECTION, EMULSION INTRAMUSCULAR; INTRAVENOUS PRN
Status: DISCONTINUED | OUTPATIENT
Start: 2023-10-27 | End: 2023-10-27 | Stop reason: SDUPTHER

## 2023-10-27 RX ORDER — ROCURONIUM BROMIDE 10 MG/ML
INJECTION, SOLUTION INTRAVENOUS PRN
Status: DISCONTINUED | OUTPATIENT
Start: 2023-10-27 | End: 2023-10-27 | Stop reason: SDUPTHER

## 2023-10-27 RX ORDER — PANTOPRAZOLE SODIUM 40 MG/1
40 TABLET, DELAYED RELEASE ORAL 2 TIMES DAILY
Status: DISCONTINUED | OUTPATIENT
Start: 2023-10-27 | End: 2023-10-30 | Stop reason: HOSPADM

## 2023-10-27 RX ORDER — ISOSORBIDE MONONITRATE 30 MG/1
30 TABLET, EXTENDED RELEASE ORAL DAILY
Status: DISCONTINUED | OUTPATIENT
Start: 2023-10-28 | End: 2023-10-30 | Stop reason: HOSPADM

## 2023-10-27 RX ORDER — METOPROLOL SUCCINATE 25 MG/1
50 TABLET, EXTENDED RELEASE ORAL DAILY
Status: DISCONTINUED | OUTPATIENT
Start: 2023-10-28 | End: 2023-10-30 | Stop reason: HOSPADM

## 2023-10-27 RX ORDER — SODIUM CHLORIDE 9 MG/ML
INJECTION, SOLUTION INTRAVENOUS PRN
Status: DISCONTINUED | OUTPATIENT
Start: 2023-10-27 | End: 2023-10-30 | Stop reason: HOSPADM

## 2023-10-27 RX ORDER — FAMOTIDINE 20 MG/1
20 TABLET, FILM COATED ORAL 2 TIMES DAILY
Status: DISCONTINUED | OUTPATIENT
Start: 2023-10-27 | End: 2023-10-30 | Stop reason: HOSPADM

## 2023-10-27 RX ORDER — OXYCODONE HYDROCHLORIDE 5 MG/1
10 TABLET ORAL EVERY 4 HOURS PRN
Status: DISCONTINUED | OUTPATIENT
Start: 2023-10-27 | End: 2023-10-30 | Stop reason: HOSPADM

## 2023-10-27 RX ORDER — ONDANSETRON 2 MG/ML
4 INJECTION INTRAMUSCULAR; INTRAVENOUS EVERY 6 HOURS PRN
Status: DISCONTINUED | OUTPATIENT
Start: 2023-10-27 | End: 2023-10-30 | Stop reason: HOSPADM

## 2023-10-27 RX ORDER — SODIUM CHLORIDE 0.9 % (FLUSH) 0.9 %
5-40 SYRINGE (ML) INJECTION PRN
Status: DISCONTINUED | OUTPATIENT
Start: 2023-10-27 | End: 2023-10-30 | Stop reason: HOSPADM

## 2023-10-27 RX ORDER — KETAMINE HCL IN NACL, ISO-OSM 100MG/10ML
SYRINGE (ML) INJECTION PRN
Status: DISCONTINUED | OUTPATIENT
Start: 2023-10-27 | End: 2023-10-27 | Stop reason: SDUPTHER

## 2023-10-27 RX ORDER — NITROGLYCERIN 0.4 MG/1
0.4 TABLET SUBLINGUAL EVERY 5 MIN PRN
Status: DISCONTINUED | OUTPATIENT
Start: 2023-10-27 | End: 2023-10-30 | Stop reason: HOSPADM

## 2023-10-27 RX ORDER — LISINOPRIL 5 MG/1
5 TABLET ORAL DAILY
Status: DISCONTINUED | OUTPATIENT
Start: 2023-10-28 | End: 2023-10-30 | Stop reason: HOSPADM

## 2023-10-27 RX ORDER — ONDANSETRON 4 MG/1
4 TABLET, ORALLY DISINTEGRATING ORAL EVERY 8 HOURS PRN
Status: DISCONTINUED | OUTPATIENT
Start: 2023-10-27 | End: 2023-10-30 | Stop reason: HOSPADM

## 2023-10-27 RX ORDER — ONDANSETRON 2 MG/ML
INJECTION INTRAMUSCULAR; INTRAVENOUS PRN
Status: DISCONTINUED | OUTPATIENT
Start: 2023-10-27 | End: 2023-10-27 | Stop reason: SDUPTHER

## 2023-10-27 RX ORDER — SODIUM CHLORIDE 0.9 % (FLUSH) 0.9 %
5-40 SYRINGE (ML) INJECTION EVERY 12 HOURS SCHEDULED
Status: DISCONTINUED | OUTPATIENT
Start: 2023-10-27 | End: 2023-10-30 | Stop reason: HOSPADM

## 2023-10-27 RX ORDER — FENTANYL CITRATE 50 UG/ML
INJECTION, SOLUTION INTRAMUSCULAR; INTRAVENOUS PRN
Status: DISCONTINUED | OUTPATIENT
Start: 2023-10-27 | End: 2023-10-27 | Stop reason: SDUPTHER

## 2023-10-27 RX ORDER — BUPIVACAINE HYDROCHLORIDE 5 MG/ML
INJECTION, SOLUTION EPIDURAL; INTRACAUDAL PRN
Status: DISCONTINUED | OUTPATIENT
Start: 2023-10-27 | End: 2023-10-27 | Stop reason: ALTCHOICE

## 2023-10-27 RX ORDER — RANOLAZINE 500 MG/1
500 TABLET, EXTENDED RELEASE ORAL 2 TIMES DAILY
Status: DISCONTINUED | OUTPATIENT
Start: 2023-10-27 | End: 2023-10-30 | Stop reason: HOSPADM

## 2023-10-27 RX ADMIN — ROCURONIUM BROMIDE 70 MG: 10 INJECTION INTRAVENOUS at 11:36

## 2023-10-27 RX ADMIN — SODIUM CHLORIDE: 9 INJECTION, SOLUTION INTRAVENOUS at 10:55

## 2023-10-27 RX ADMIN — HYDROMORPHONE HYDROCHLORIDE 0.5 MG: 2 INJECTION INTRAMUSCULAR; INTRAVENOUS; SUBCUTANEOUS at 13:48

## 2023-10-27 RX ADMIN — HYDROMORPHONE HYDROCHLORIDE 0.5 MG: 2 INJECTION INTRAMUSCULAR; INTRAVENOUS; SUBCUTANEOUS at 13:06

## 2023-10-27 RX ADMIN — Medication 20 MG: at 11:36

## 2023-10-27 RX ADMIN — HYDROMORPHONE HYDROCHLORIDE 0.5 MG: 1 INJECTION, SOLUTION INTRAMUSCULAR; INTRAVENOUS; SUBCUTANEOUS at 14:25

## 2023-10-27 RX ADMIN — FENTANYL CITRATE 50 MCG: 50 INJECTION, SOLUTION INTRAMUSCULAR; INTRAVENOUS at 12:08

## 2023-10-27 RX ADMIN — RANOLAZINE 500 MG: 500 TABLET, EXTENDED RELEASE ORAL at 20:46

## 2023-10-27 RX ADMIN — ONDANSETRON 4 MG: 2 INJECTION INTRAMUSCULAR; INTRAVENOUS at 13:36

## 2023-10-27 RX ADMIN — SODIUM CHLORIDE: 9 INJECTION, SOLUTION INTRAVENOUS at 17:27

## 2023-10-27 RX ADMIN — MIDAZOLAM 1 MG: 1 INJECTION INTRAMUSCULAR; INTRAVENOUS at 11:29

## 2023-10-27 RX ADMIN — HYDROMORPHONE HYDROCHLORIDE 0.5 MG: 1 INJECTION, SOLUTION INTRAMUSCULAR; INTRAVENOUS; SUBCUTANEOUS at 14:30

## 2023-10-27 RX ADMIN — PROPOFOL 15 MG: 10 INJECTION, EMULSION INTRAVENOUS at 13:48

## 2023-10-27 RX ADMIN — HYDROMORPHONE HYDROCHLORIDE 0.5 MG: 1 INJECTION, SOLUTION INTRAMUSCULAR; INTRAVENOUS; SUBCUTANEOUS at 14:15

## 2023-10-27 RX ADMIN — MIDAZOLAM 1 MG: 1 INJECTION INTRAMUSCULAR; INTRAVENOUS at 11:36

## 2023-10-27 RX ADMIN — DEXAMETHASONE SODIUM PHOSPHATE 4 MG: 10 INJECTION, EMULSION INTRAMUSCULAR; INTRAVENOUS at 11:36

## 2023-10-27 RX ADMIN — FENTANYL CITRATE 50 MCG: 50 INJECTION, SOLUTION INTRAMUSCULAR; INTRAVENOUS at 12:23

## 2023-10-27 RX ADMIN — FAMOTIDINE 20 MG: 20 TABLET, FILM COATED ORAL at 20:46

## 2023-10-27 RX ADMIN — Medication 100 MG: at 11:36

## 2023-10-27 RX ADMIN — FENTANYL CITRATE 50 MCG: 50 INJECTION, SOLUTION INTRAMUSCULAR; INTRAVENOUS at 12:05

## 2023-10-27 RX ADMIN — PROPOFOL 150 MG: 10 INJECTION, EMULSION INTRAVENOUS at 11:36

## 2023-10-27 RX ADMIN — HYDROMORPHONE HYDROCHLORIDE 0.5 MG: 1 INJECTION, SOLUTION INTRAMUSCULAR; INTRAVENOUS; SUBCUTANEOUS at 14:35

## 2023-10-27 RX ADMIN — OXYCODONE HYDROCHLORIDE 10 MG: 5 TABLET ORAL at 14:20

## 2023-10-27 RX ADMIN — HYDROMORPHONE HYDROCHLORIDE 0.5 MG: 1 INJECTION, SOLUTION INTRAMUSCULAR; INTRAVENOUS; SUBCUTANEOUS at 18:57

## 2023-10-27 RX ADMIN — ATORVASTATIN CALCIUM 40 MG: 40 TABLET, FILM COATED ORAL at 20:46

## 2023-10-27 RX ADMIN — PHENYLEPHRINE HYDROCHLORIDE 50 MCG: 10 INJECTION INTRAVENOUS at 11:36

## 2023-10-27 RX ADMIN — FENTANYL CITRATE 50 MCG: 50 INJECTION, SOLUTION INTRAMUSCULAR; INTRAVENOUS at 12:44

## 2023-10-27 RX ADMIN — ROCURONIUM BROMIDE 10 MG: 10 INJECTION INTRAVENOUS at 13:08

## 2023-10-27 RX ADMIN — SUGAMMADEX 200 MG: 100 INJECTION, SOLUTION INTRAVENOUS at 13:52

## 2023-10-27 RX ADMIN — OXYCODONE HYDROCHLORIDE 10 MG: 5 TABLET ORAL at 20:46

## 2023-10-27 RX ADMIN — PANTOPRAZOLE SODIUM 40 MG: 40 TABLET, DELAYED RELEASE ORAL at 20:46

## 2023-10-27 ASSESSMENT — PAIN DESCRIPTION - DESCRIPTORS: DESCRIPTORS: THROBBING

## 2023-10-27 ASSESSMENT — PAIN SCALES - GENERAL
PAINLEVEL_OUTOF10: 3
PAINLEVEL_OUTOF10: 6
PAINLEVEL_OUTOF10: 3
PAINLEVEL_OUTOF10: 7
PAINLEVEL_OUTOF10: 0
PAINLEVEL_OUTOF10: 7
PAINLEVEL_OUTOF10: 3
PAINLEVEL_OUTOF10: 9
PAINLEVEL_OUTOF10: 7

## 2023-10-27 ASSESSMENT — PAIN DESCRIPTION - LOCATION
LOCATION: THROAT
LOCATION: NECK
LOCATION: THROAT

## 2023-10-27 NOTE — H&P
Yuriy  History and Physical Update      Pt Name: Inocencia Esposito  MRN: [id number]  YOB: 1965  Date of evaluation: 10/26/2023    [x] I have examined the patient and reviewed the H&P/Consult and there are no changes to the patient or plans. [] I have examined the patient and reviewed the H&P/Consult and have noted the following changes:         Reed Miller MD  Electronically signed 10/26/2023 at 11:44 PM

## 2023-10-27 NOTE — ANESTHESIA POSTPROCEDURE EVALUATION
Department of Anesthesiology  Postprocedure Note    Patient: Alex Farrar  MRN: [id number]  YOB: 1965  Date of evaluation: 10/27/2023      Procedure Summary     Date: 10/27/23 Room / Location: [address]    Anesthesia Start: 1130 Anesthesia Stop: 1402    Procedure: TOTAL THYROIDECTOMY Diagnosis:       Multinodular goiter      (Multinodular goiter [E04.2])    Surgeons: Lyla Ying MD Responsible Provider: Soniya Bains MD    Anesthesia Type: General ASA Status: 3          Anesthesia Type: General    Biju Phase I: Biju Score: 9    Biju Phase II:        Anesthesia Post Evaluation    Patient location during evaluation: PACU  Patient participation: complete - patient participated  Level of consciousness: awake and alert  Airway patency: patent  Nausea & Vomiting: no nausea  Complications: no  Cardiovascular status: blood pressure returned to baseline and hemodynamically stable  Respiratory status: acceptable and spontaneous ventilation  Hydration status: euvolemic  Pain management: adequate

## 2023-10-27 NOTE — ANESTHESIA PRE PROCEDURE
Department of Anesthesiology  Preprocedure Note       Name:  Shahbaz Guadalupe   Age:  59 y.o.  :  1965                                          MRN:  [id number]         Date:  10/27/2023      Surgeon: Delonte Cruz):  Izella Homans, MD    Procedure: Procedure(s):  TOTAL THYROIDECTOMY    Medications prior to admission:   Prior to Admission medications    Medication Sig Start Date End Date Taking? Authorizing Provider   [facility]) 7.5 MG/0.5ML SOPN SC injection Inject 0.5 mLs into the skin once a week  Patient taking differently: Inject 0.5 mLs into the skin once a week FRIDAY 10/2/23   Peyton Carlton DO   pantoprazole (PROTONIX) 40 MG tablet TAKE 1 TABLET BY MOUTH TWICE  DAILY 23   Claudetta Leech, MD   ranolazine (RANEXA) 500 MG extended release tablet Take 1 tablet by mouth 2 times daily 23   Claudetta Leech, MD   metoprolol succinate (TOPROL XL) 25 MG extended release tablet TAKE 1 TABLET BY MOUTH  TWICE DAILY 23   Claudetta Leech, MD   atorvastatin (LIPITOR) 40 MG tablet Take 1 tablet by mouth daily 23   Claudetta Leech, MD   ticagrelor (BRILINTA) 90 MG TABS tablet Take 1 tablet by mouth 2 times daily 23   Claudetta Leech, MD   lisinopril (PRINIVIL;ZESTRIL) 5 MG tablet Take 1 tablet by mouth daily 23   Claudetta Leech, MD   nitroGLYCERIN (NITROSTAT) 0.4 MG SL tablet Place 1 tablet under the tongue every 5 minutes as needed for Chest pain up to max of 3 total doses. If no relief after 1 dose, call 911. 23   Claudetta Leech, MD   isosorbide mononitrate (IMDUR) 30 MG extended release tablet Take 1 tablet by mouth daily 23   Claudetta Leech, MD   Multiple Vitamins-Minerals (ONE-A-DAY 50 PLUS PO) Take by mouth    Prasanna Landeros MD   aspirin 81 MG tablet Take 1 tablet by mouth daily    Prasanna Landeros MD       Current medications:    No current facility-administered medications for this encounter.        Allergies:

## 2023-10-27 NOTE — H&P
[place] [address]                              HISTORY AND PHYSICAL    PATIENT NAME: Diane Carrillo                   :        1965  MED REC NO:   [id number]                           ROOM:  ACCOUNT NO:   [de-identified]                           ADMIT DATE: 10/27/2023  PROVIDER:     Tracey Landers. Kaylan Terry MD    CHIEF COMPLAINT:  Multinodular goiter, thyromegaly. HISTORY OF PRESENT ILLNESS:  The patient is a 78-year-old male who does  have significant medical issues including coronary artery disease, who  has a stent in place. He has also been on Brilinta and aspirin. He has  a long history of a multinodular goiter and has an enlarging nodule that  was recently biopsied, showing a benign follicular lesion. He is  followed by the endocrinologist, Dr. Kaylan Garcia, but was felt to be in need  of a total thyroidectomy due to this enlarging mass and his bilateral  essentially goiter. The patient denies any real compressive symptoms,  but does have several nodules that are enlarging and is being admitted  at this time for a total thyroidectomy. He has no history of radiation  to the neck, no family history of thyroid disease. PAST MEDICAL HISTORY:  Positive for angina, atrial dysrhythmias,  coronary artery disease, hypertension, sleep apnea, diabetes,  hypercholestrolism. PAST SURGICAL HISTORY:  Surgeries include remote appendectomy; he has  had lumbar disk surgery x2; he has had coronary angioplasty x2; he has  had orthopedic procedures on his right elbow, his left knee, and  shoulder; he has had EGDs and colonoscopies. MEDICATIONS:  Aspirin, atorvastatin, Brilinta, isosorbide, lisinopril,  metoprolol, Mounjaro, Nitrostat, Protonix, and ranolazine. ALLERGIES:  NONE. REVIEW OF SYSTEMS:  Ten-point review of systems is negative. PHYSICAL EXAMINATION:  GENERAL:  The patient is a 78-year-old male, appears his age.   HEAD,

## 2023-10-27 NOTE — PROGRESS NOTES
1359 - pt arrives to pacu, respirations unlabored on 8 L simple mask, pt denies pain, VSS    1415 - Sobeida from lab at bedside, pt given 0.5 mg of dilaudid    1420 - pt given 10 mg of oxycodone    1425 - pt given 0.5 mg of dilaudid    1430 - pt given 0.5 mg of dilaudid    1435 - pt given 0.5 mg of dilaudid    1445 - pt resting comfortably, pt states pain tolerable, this RN spoke with Campos from [facility] to check on status of room, they stated room has not started cleaning yet    1455- pt meets criteria for discharge from pacu at this time, pt transported to [facility] in stable condition, 5K17 dirty

## 2023-10-27 NOTE — BRIEF OP NOTE
Brief Postoperative Note      Patient: Og Olivares  YOB: 1965  MRN: [id number]    Date of Procedure: 10/27/2023    Pre-Op Diagnosis Codes:     * Multinodular goiter [E04.2]    Post-Op Diagnosis: same       Procedure(s):  TOTAL THYROIDECTOMY    Surgeon(s):  Sharri Eldridge MD    Assistant:  First Assistant: Stuart Cleary    Anesthesia: General    Estimated Blood Loss (EL):75 ml    Complications: none    Specimens:   ID Type Source Tests Collected by Time Destination   A : Thyroid Tissue Thyroid SURGICAL PATHOLOGY Sharri Eldridge MD 10/27/2023 1203        Implants:        Drains:     Findings: see op note      Electronically signed by Sharri Eldridge MD on 10/27/2023 at 2:01 PM

## 2023-10-27 NOTE — PROGRESS NOTES
Patient admitted to [facility] room 2 with family at bedside. Bed in low position side rails up call light in reach. Patient denies questions at this time.

## 2023-10-28 LAB
BASOPHILS ABSOLUTE: 0 THOU/MM3 (ref 0–0.1)
BASOPHILS NFR BLD AUTO: 0.3 %
CA-I BLD ISE-SCNC: 1.03 MMOL/L (ref 1.12–1.32)
DEPRECATED RDW RBC AUTO: 51 FL (ref 35–45)
DEPRECATED RDW RBC AUTO: 53.1 FL (ref 35–45)
EOSINOPHIL NFR BLD AUTO: 0.4 %
EOSINOPHILS ABSOLUTE: 0.1 THOU/MM3 (ref 0–0.4)
ERYTHROCYTE [DISTWIDTH] IN BLOOD BY AUTOMATED COUNT: 16 % (ref 11.5–14.5)
ERYTHROCYTE [DISTWIDTH] IN BLOOD BY AUTOMATED COUNT: 16.2 % (ref 11.5–14.5)
HCT VFR BLD AUTO: 37.1 % (ref 42–52)
HCT VFR BLD AUTO: 41.4 % (ref 42–52)
HGB BLD-MCNC: 11.3 GM/DL (ref 14–18)
HGB BLD-MCNC: 12 GM/DL (ref 14–18)
IMM GRANULOCYTES # BLD AUTO: 0.05 THOU/MM3 (ref 0–0.07)
IMM GRANULOCYTES NFR BLD AUTO: 0.4 %
LACTIC ACID, SEPSIS: 0.9 MMOL/L (ref 0.5–1.9)
LYMPHOCYTES ABSOLUTE: 1 THOU/MM3 (ref 1–4.8)
LYMPHOCYTES NFR BLD AUTO: 8 %
MCH RBC QN AUTO: 26.1 PG (ref 26–33)
MCH RBC QN AUTO: 26.5 PG (ref 26–33)
MCHC RBC AUTO-ENTMCNC: 29 GM/DL (ref 32.2–35.5)
MCHC RBC AUTO-ENTMCNC: 30.5 GM/DL (ref 32.2–35.5)
MCV RBC AUTO: 87.1 FL (ref 80–94)
MCV RBC AUTO: 90 FL (ref 80–94)
MONOCYTES ABSOLUTE: 1.7 THOU/MM3 (ref 0.4–1.3)
MONOCYTES NFR BLD AUTO: 12.8 %
NEUTROPHILS NFR BLD AUTO: 78.1 %
NRBC BLD AUTO-RTO: 0 /100 WBC
PLATELET # BLD AUTO: 279 THOU/MM3 (ref 130–400)
PLATELET # BLD AUTO: 307 THOU/MM3 (ref 130–400)
PMV BLD AUTO: 9.4 FL (ref 9.4–12.4)
PMV BLD AUTO: 9.8 FL (ref 9.4–12.4)
RBC # BLD AUTO: 4.26 MILL/MM3 (ref 4.7–6.1)
RBC # BLD AUTO: 4.6 MILL/MM3 (ref 4.7–6.1)
SEGMENTED NEUTROPHILS ABSOLUTE COUNT: 10.1 THOU/MM3 (ref 1.8–7.7)
WBC # BLD AUTO: 12.9 THOU/MM3 (ref 4.8–10.8)
WBC # BLD AUTO: 16.2 THOU/MM3 (ref 4.8–10.8)

## 2023-10-28 PROCEDURE — 6360000002 HC RX W HCPCS: Performed by: SURGERY

## 2023-10-28 PROCEDURE — G0378 HOSPITAL OBSERVATION PER HR: HCPCS

## 2023-10-28 PROCEDURE — 85025 COMPLETE CBC W/AUTO DIFF WBC: CPT

## 2023-10-28 PROCEDURE — 85027 COMPLETE CBC AUTOMATED: CPT

## 2023-10-28 PROCEDURE — 6370000000 HC RX 637 (ALT 250 FOR IP): Performed by: SURGERY

## 2023-10-28 PROCEDURE — 96374 THER/PROPH/DIAG INJ IV PUSH: CPT

## 2023-10-28 PROCEDURE — 96376 TX/PRO/DX INJ SAME DRUG ADON: CPT

## 2023-10-28 PROCEDURE — 36415 COLL VENOUS BLD VENIPUNCTURE: CPT

## 2023-10-28 PROCEDURE — 87040 BLOOD CULTURE FOR BACTERIA: CPT

## 2023-10-28 PROCEDURE — 82330 ASSAY OF CALCIUM: CPT

## 2023-10-28 PROCEDURE — 83605 ASSAY OF LACTIC ACID: CPT

## 2023-10-28 PROCEDURE — 96361 HYDRATE IV INFUSION ADD-ON: CPT

## 2023-10-28 PROCEDURE — 2580000003 HC RX 258: Performed by: SURGERY

## 2023-10-28 RX ORDER — ACETAMINOPHEN 325 MG/1
650 TABLET ORAL EVERY 4 HOURS PRN
Status: DISCONTINUED | OUTPATIENT
Start: 2023-10-28 | End: 2023-10-30 | Stop reason: HOSPADM

## 2023-10-28 RX ADMIN — ISOSORBIDE MONONITRATE 30 MG: 30 TABLET, EXTENDED RELEASE ORAL at 08:04

## 2023-10-28 RX ADMIN — HYDROMORPHONE HYDROCHLORIDE 0.5 MG: 1 INJECTION, SOLUTION INTRAMUSCULAR; INTRAVENOUS; SUBCUTANEOUS at 04:47

## 2023-10-28 RX ADMIN — METOPROLOL SUCCINATE 50 MG: 25 TABLET, EXTENDED RELEASE ORAL at 08:04

## 2023-10-28 RX ADMIN — ATORVASTATIN CALCIUM 40 MG: 40 TABLET, FILM COATED ORAL at 08:05

## 2023-10-28 RX ADMIN — PANTOPRAZOLE SODIUM 40 MG: 40 TABLET, DELAYED RELEASE ORAL at 08:04

## 2023-10-28 RX ADMIN — HYDROMORPHONE HYDROCHLORIDE 0.5 MG: 1 INJECTION, SOLUTION INTRAMUSCULAR; INTRAVENOUS; SUBCUTANEOUS at 16:34

## 2023-10-28 RX ADMIN — ACETAMINOPHEN 650 MG: 325 TABLET ORAL at 19:39

## 2023-10-28 RX ADMIN — RANOLAZINE 500 MG: 500 TABLET, EXTENDED RELEASE ORAL at 08:04

## 2023-10-28 RX ADMIN — PANTOPRAZOLE SODIUM 40 MG: 40 TABLET, DELAYED RELEASE ORAL at 21:02

## 2023-10-28 RX ADMIN — LISINOPRIL 5 MG: 5 TABLET ORAL at 08:04

## 2023-10-28 RX ADMIN — RANOLAZINE 500 MG: 500 TABLET, EXTENDED RELEASE ORAL at 21:02

## 2023-10-28 RX ADMIN — OXYCODONE HYDROCHLORIDE 10 MG: 5 TABLET ORAL at 15:14

## 2023-10-28 RX ADMIN — OXYCODONE HYDROCHLORIDE 10 MG: 5 TABLET ORAL at 08:05

## 2023-10-28 RX ADMIN — FAMOTIDINE 20 MG: 20 TABLET, FILM COATED ORAL at 08:04

## 2023-10-28 RX ADMIN — OXYCODONE HYDROCHLORIDE 10 MG: 5 TABLET ORAL at 01:14

## 2023-10-28 RX ADMIN — FAMOTIDINE 20 MG: 20 TABLET, FILM COATED ORAL at 21:02

## 2023-10-28 RX ADMIN — SODIUM CHLORIDE: 9 INJECTION, SOLUTION INTRAVENOUS at 01:07

## 2023-10-28 ASSESSMENT — PAIN DESCRIPTION - LOCATION
LOCATION: NECK
LOCATION: HEAD;THROAT
LOCATION: NECK
LOCATION: HEAD
LOCATION: HEAD
LOCATION: NECK

## 2023-10-28 ASSESSMENT — PAIN DESCRIPTION - ORIENTATION
ORIENTATION: MID

## 2023-10-28 ASSESSMENT — PAIN SCALES - GENERAL
PAINLEVEL_OUTOF10: 8
PAINLEVEL_OUTOF10: 1
PAINLEVEL_OUTOF10: 3
PAINLEVEL_OUTOF10: 7
PAINLEVEL_OUTOF10: 7
PAINLEVEL_OUTOF10: 6
PAINLEVEL_OUTOF10: 8
PAINLEVEL_OUTOF10: 5
PAINLEVEL_OUTOF10: 9

## 2023-10-28 ASSESSMENT — PAIN DESCRIPTION - DESCRIPTORS
DESCRIPTORS: ACHING
DESCRIPTORS: THROBBING

## 2023-10-28 ASSESSMENT — PAIN - FUNCTIONAL ASSESSMENT
PAIN_FUNCTIONAL_ASSESSMENT: ACTIVITIES ARE NOT PREVENTED

## 2023-10-28 ASSESSMENT — PAIN DESCRIPTION - PAIN TYPE: TYPE: ACUTE PAIN

## 2023-10-28 ASSESSMENT — PAIN DESCRIPTION - FREQUENCY: FREQUENCY: CONTINUOUS

## 2023-10-28 ASSESSMENT — PAIN DESCRIPTION - ONSET: ONSET: ON-GOING

## 2023-10-29 LAB
CA-I BLD ISE-SCNC: 1.04 MMOL/L (ref 1.12–1.32)
DEPRECATED RDW RBC AUTO: 50.5 FL (ref 35–45)
ERYTHROCYTE [DISTWIDTH] IN BLOOD BY AUTOMATED COUNT: 16.1 % (ref 11.5–14.5)
HCT VFR BLD AUTO: 37.2 % (ref 42–52)
HGB BLD-MCNC: 11.3 GM/DL (ref 14–18)
MCH RBC QN AUTO: 25.9 PG (ref 26–33)
MCHC RBC AUTO-ENTMCNC: 30.4 GM/DL (ref 32.2–35.5)
MCV RBC AUTO: 85.3 FL (ref 80–94)
PLATELET # BLD AUTO: 285 THOU/MM3 (ref 130–400)
PMV BLD AUTO: 9.8 FL (ref 9.4–12.4)
RBC # BLD AUTO: 4.36 MILL/MM3 (ref 4.7–6.1)
WBC # BLD AUTO: 12.1 THOU/MM3 (ref 4.8–10.8)

## 2023-10-29 PROCEDURE — 82330 ASSAY OF CALCIUM: CPT

## 2023-10-29 PROCEDURE — 85027 COMPLETE CBC AUTOMATED: CPT

## 2023-10-29 PROCEDURE — 36415 COLL VENOUS BLD VENIPUNCTURE: CPT

## 2023-10-29 PROCEDURE — 2580000003 HC RX 258: Performed by: SURGERY

## 2023-10-29 PROCEDURE — G0378 HOSPITAL OBSERVATION PER HR: HCPCS

## 2023-10-29 PROCEDURE — 6370000000 HC RX 637 (ALT 250 FOR IP): Performed by: SURGERY

## 2023-10-29 PROCEDURE — 96361 HYDRATE IV INFUSION ADD-ON: CPT

## 2023-10-29 RX ADMIN — RANOLAZINE 500 MG: 500 TABLET, EXTENDED RELEASE ORAL at 20:47

## 2023-10-29 RX ADMIN — SODIUM CHLORIDE: 9 INJECTION, SOLUTION INTRAVENOUS at 01:53

## 2023-10-29 RX ADMIN — ATORVASTATIN CALCIUM 40 MG: 40 TABLET, FILM COATED ORAL at 07:53

## 2023-10-29 RX ADMIN — OXYCODONE HYDROCHLORIDE 10 MG: 5 TABLET ORAL at 20:03

## 2023-10-29 RX ADMIN — FAMOTIDINE 20 MG: 20 TABLET, FILM COATED ORAL at 07:53

## 2023-10-29 RX ADMIN — LISINOPRIL 5 MG: 5 TABLET ORAL at 07:53

## 2023-10-29 RX ADMIN — METOPROLOL SUCCINATE 50 MG: 25 TABLET, EXTENDED RELEASE ORAL at 07:53

## 2023-10-29 RX ADMIN — PANTOPRAZOLE SODIUM 40 MG: 40 TABLET, DELAYED RELEASE ORAL at 20:47

## 2023-10-29 RX ADMIN — PANTOPRAZOLE SODIUM 40 MG: 40 TABLET, DELAYED RELEASE ORAL at 07:53

## 2023-10-29 RX ADMIN — SODIUM CHLORIDE, PRESERVATIVE FREE 10 ML: 5 INJECTION INTRAVENOUS at 20:48

## 2023-10-29 RX ADMIN — RANOLAZINE 500 MG: 500 TABLET, EXTENDED RELEASE ORAL at 07:53

## 2023-10-29 RX ADMIN — OXYCODONE HYDROCHLORIDE 10 MG: 5 TABLET ORAL at 03:23

## 2023-10-29 RX ADMIN — ACETAMINOPHEN 650 MG: 325 TABLET ORAL at 14:32

## 2023-10-29 RX ADMIN — ACETAMINOPHEN 650 MG: 325 TABLET ORAL at 03:23

## 2023-10-29 RX ADMIN — FAMOTIDINE 20 MG: 20 TABLET, FILM COATED ORAL at 20:47

## 2023-10-29 RX ADMIN — ACETAMINOPHEN 650 MG: 325 TABLET ORAL at 20:03

## 2023-10-29 RX ADMIN — ISOSORBIDE MONONITRATE 30 MG: 30 TABLET, EXTENDED RELEASE ORAL at 07:53

## 2023-10-29 ASSESSMENT — PAIN DESCRIPTION - ORIENTATION
ORIENTATION: MID
ORIENTATION: MID

## 2023-10-29 ASSESSMENT — PAIN SCALES - GENERAL
PAINLEVEL_OUTOF10: 3
PAINLEVEL_OUTOF10: 5
PAINLEVEL_OUTOF10: 7
PAINLEVEL_OUTOF10: 7
PAINLEVEL_OUTOF10: 5
PAINLEVEL_OUTOF10: 2

## 2023-10-29 ASSESSMENT — PAIN DESCRIPTION - DESCRIPTORS
DESCRIPTORS: THROBBING
DESCRIPTORS: ACHING

## 2023-10-29 ASSESSMENT — PAIN DESCRIPTION - FREQUENCY
FREQUENCY: INTERMITTENT
FREQUENCY: CONTINUOUS

## 2023-10-29 ASSESSMENT — PAIN DESCRIPTION - LOCATION
LOCATION: NECK
LOCATION: NECK
LOCATION: HEAD
LOCATION: NECK

## 2023-10-29 ASSESSMENT — PAIN DESCRIPTION - PAIN TYPE
TYPE: ACUTE PAIN
TYPE: ACUTE PAIN

## 2023-10-29 ASSESSMENT — PAIN DESCRIPTION - DIRECTION: RADIATING_TOWARDS: NECK

## 2023-10-29 ASSESSMENT — PAIN DESCRIPTION - ONSET
ONSET: ON-GOING
ONSET: ON-GOING

## 2023-10-29 ASSESSMENT — PAIN - FUNCTIONAL ASSESSMENT
PAIN_FUNCTIONAL_ASSESSMENT: ACTIVITIES ARE NOT PREVENTED
PAIN_FUNCTIONAL_ASSESSMENT: ACTIVITIES ARE NOT PREVENTED

## 2023-10-30 VITALS
WEIGHT: 315 LBS | BODY MASS INDEX: 42.66 KG/M2 | HEIGHT: 72 IN | HEART RATE: 80 BPM | RESPIRATION RATE: 16 BRPM | DIASTOLIC BLOOD PRESSURE: 57 MMHG | TEMPERATURE: 98.8 F | OXYGEN SATURATION: 95 % | SYSTOLIC BLOOD PRESSURE: 128 MMHG

## 2023-10-30 LAB
CA-I BLD ISE-SCNC: 1.07 MMOL/L (ref 1.12–1.32)
DEPRECATED RDW RBC AUTO: 50 FL (ref 35–45)
ERYTHROCYTE [DISTWIDTH] IN BLOOD BY AUTOMATED COUNT: 15.9 % (ref 11.5–14.5)
HCT VFR BLD AUTO: 34.3 % (ref 42–52)
HGB BLD-MCNC: 10.7 GM/DL (ref 14–18)
MCH RBC QN AUTO: 26.4 PG (ref 26–33)
MCHC RBC AUTO-ENTMCNC: 31.2 GM/DL (ref 32.2–35.5)
MCV RBC AUTO: 84.7 FL (ref 80–94)
PLATELET # BLD AUTO: 245 THOU/MM3 (ref 130–400)
PMV BLD AUTO: 9.4 FL (ref 9.4–12.4)
RBC # BLD AUTO: 4.05 MILL/MM3 (ref 4.7–6.1)
WBC # BLD AUTO: 9.4 THOU/MM3 (ref 4.8–10.8)

## 2023-10-30 PROCEDURE — 6370000000 HC RX 637 (ALT 250 FOR IP): Performed by: SURGERY

## 2023-10-30 PROCEDURE — 2580000003 HC RX 258: Performed by: SURGERY

## 2023-10-30 PROCEDURE — 85027 COMPLETE CBC AUTOMATED: CPT

## 2023-10-30 PROCEDURE — 82330 ASSAY OF CALCIUM: CPT

## 2023-10-30 PROCEDURE — 36415 COLL VENOUS BLD VENIPUNCTURE: CPT

## 2023-10-30 PROCEDURE — G0378 HOSPITAL OBSERVATION PER HR: HCPCS

## 2023-10-30 RX ORDER — OXYCODONE HYDROCHLORIDE AND ACETAMINOPHEN 5; 325 MG/1; MG/1
1 TABLET ORAL EVERY 6 HOURS PRN
Qty: 20 TABLET | Refills: 0 | Status: SHIPPED | OUTPATIENT
Start: 2023-10-30 | End: 2023-11-03

## 2023-10-30 RX ADMIN — METOPROLOL SUCCINATE 50 MG: 25 TABLET, EXTENDED RELEASE ORAL at 07:51

## 2023-10-30 RX ADMIN — PANTOPRAZOLE SODIUM 40 MG: 40 TABLET, DELAYED RELEASE ORAL at 07:50

## 2023-10-30 RX ADMIN — ISOSORBIDE MONONITRATE 30 MG: 30 TABLET, EXTENDED RELEASE ORAL at 07:51

## 2023-10-30 RX ADMIN — ACETAMINOPHEN 650 MG: 325 TABLET ORAL at 07:50

## 2023-10-30 RX ADMIN — RANOLAZINE 500 MG: 500 TABLET, EXTENDED RELEASE ORAL at 07:51

## 2023-10-30 RX ADMIN — LISINOPRIL 5 MG: 5 TABLET ORAL at 07:51

## 2023-10-30 RX ADMIN — ATORVASTATIN CALCIUM 40 MG: 40 TABLET, FILM COATED ORAL at 07:51

## 2023-10-30 RX ADMIN — SODIUM CHLORIDE, PRESERVATIVE FREE 10 ML: 5 INJECTION INTRAVENOUS at 07:50

## 2023-10-30 RX ADMIN — FAMOTIDINE 20 MG: 20 TABLET, FILM COATED ORAL at 07:50

## 2023-10-30 ASSESSMENT — PAIN SCALES - GENERAL: PAINLEVEL_OUTOF10: 2

## 2023-10-30 NOTE — PLAN OF CARE
Problem: Discharge Planning  Goal: Discharge to home or other facility with appropriate resources  10/30/2023 0726 by Angeline Davis RN  Outcome: Adequate for Discharge  10/29/2023 2239 by Ina Sauer RN  Outcome: Progressing     Problem: Chronic Conditions and Co-morbidities  Goal: Patient's chronic conditions and co-morbidity symptoms are monitored and maintained or improved  10/30/2023 0726 by Angeline Davis RN  Outcome: Adequate for Discharge  10/29/2023 2239 by Ina Sauer RN  Outcome: Progressing     Problem: Pain  Goal: Verbalizes/displays adequate comfort level or baseline comfort level  10/30/2023 0726 by Angeline Davis RN  Outcome: Adequate for Discharge  10/29/2023 2239 by Ina Sauer RN  Outcome: Progressing     Problem: ABCDS Injury Assessment  Goal: Absence of physical injury  10/30/2023 0726 by Angeline Davis RN  Outcome: Adequate for Discharge  10/29/2023 2239 by Ina Sauer RN  Outcome: Progressing     Problem: Safety - Adult  Goal: Free from fall injury  10/30/2023 0726 by Angeline Davis RN  Outcome: Adequate for Discharge  10/29/2023 2239 by Ina Sauer RN  Outcome: Progressing     Problem: Respiratory - Adult  Goal: Achieves optimal ventilation and oxygenation  10/30/2023 0726 by Angeline Davis RN  Outcome: Adequate for Discharge  10/29/2023 2239 by Ina Sauer RN  Outcome: Progressing     Problem: Infection - Adult  Goal: Absence of infection at discharge  10/30/2023 0726 by Angeline Davis RN  Outcome: Adequate for Discharge  10/29/2023 2239 by Ina Sauer RN  Outcome: Progressing     Problem: Hematologic - Adult  Goal: Maintains hematologic stability  10/30/2023 0726 by Angeline Davis RN  Outcome: Adequate for Discharge  10/29/2023 2239 by Ian Sauer RN  Outcome: Progressing     Problem: Metabolic/Fluid and Electrolytes - Adult  Goal: Electrolytes maintained within normal limits  10/30/2023 0726 by Angeline Davis
Problem: Discharge Planning  Goal: Discharge to home or other facility with appropriate resources  Outcome: Progressing     Problem: Chronic Conditions and Co-morbidities  Goal: Patient's chronic conditions and co-morbidity symptoms are monitored and maintained or improved  Outcome: Progressing     Problem: Pain  Goal: Verbalizes/displays adequate comfort level or baseline comfort level  Outcome: Progressing     Problem: ABCDS Injury Assessment  Goal: Absence of physical injury  Outcome: Progressing     Problem: Skin/Tissue Integrity  Goal: Absence of new skin breakdown  Description: 1. Monitor for areas of redness and/or skin breakdown  2. Assess vascular access sites hourly  3. Every 4-6 hours minimum:  Change oxygen saturation probe site  4. Every 4-6 hours:  If on nasal continuous positive airway pressure, respiratory therapy assess nares and determine need for appliance change or resting period. 10/27/2023 2151 by Rita Aguilar RN  Outcome: Progressing  10/27/2023 1707 by Ana Cristina Marinelli RN  Outcome: Progressing   Care plan reviewed with patient. Patient verbalizes understanding of the plan of care and contributes to goal setting.
Problem: Discharge Planning  Goal: Discharge to home or other facility with appropriate resources  Outcome: Progressing     Problem: Chronic Conditions and Co-morbidities  Goal: Patient's chronic conditions and co-morbidity symptoms are monitored and maintained or improved  Outcome: Progressing     Problem: Pain  Goal: Verbalizes/displays adequate comfort level or baseline comfort level  Outcome: Progressing     Problem: ABCDS Injury Assessment  Goal: Absence of physical injury  Outcome: Progressing     Problem: Skin/Tissue Integrity  Goal: Absence of new skin breakdown  Description: 1. Monitor for areas of redness and/or skin breakdown  2. Assess vascular access sites hourly  3. Every 4-6 hours minimum:  Change oxygen saturation probe site  4. Every 4-6 hours:  If on nasal continuous positive airway pressure, respiratory therapy assess nares and determine need for appliance change or resting period. Outcome: Progressing     Problem: Safety - Adult  Goal: Free from fall injury  Outcome: Progressing   Care plan reviewed with patient. Patient verbalizes understanding of the plan of care and contributes to goal setting.
Problem: Discharge Planning  Goal: Discharge to home or other facility with appropriate resources  Outcome: Progressing  Flowsheets (Taken 10/29/2023 1536)  Discharge to home or other facility with appropriate resources:   Identify barriers to discharge with patient and caregiver   Arrange for needed discharge resources and transportation as appropriate   Identify discharge learning needs (meds, wound care, etc)  Note: Plans to return home alone with family support. Problem: Chronic Conditions and Co-morbidities  Goal: Patient's chronic conditions and co-morbidity symptoms are monitored and maintained or improved  Outcome: Progressing  Flowsheets (Taken 10/29/2023 1536)  Care Plan - Patient's Chronic Conditions and Co-Morbidity Symptoms are Monitored and Maintained or Improved:   Monitor and assess patient's chronic conditions and comorbid symptoms for stability, deterioration, or improvement   Collaborate with multidisciplinary team to address chronic and comorbid conditions and prevent exacerbation or deterioration     Problem: Pain  Goal: Verbalizes/displays adequate comfort level or baseline comfort level  Outcome: Progressing  Flowsheets (Taken 10/29/2023 1536)  Verbalizes/displays adequate comfort level or baseline comfort level:   Encourage patient to monitor pain and request assistance   Assess pain using appropriate pain scale   Administer analgesics based on type and severity of pain and evaluate response  Note: PRN Tylenol and Tiara. Problem: ABCDS Injury Assessment  Goal: Absence of physical injury  Outcome: Progressing  Flowsheets (Taken 10/29/2023 1536)  Absence of Physical Injury: Implement safety measures based on patient assessment     Problem: Skin/Tissue Integrity  Goal: Absence of new skin breakdown  Description: 1. Monitor for areas of redness and/or skin breakdown  2. Assess vascular access sites hourly  3. Every 4-6 hours minimum:  Change oxygen saturation probe site  4.   Every
Problem: Skin/Tissue Integrity  Goal: Absence of new skin breakdown  Description: 1. Monitor for areas of redness and/or skin breakdown  2. Assess vascular access sites hourly  3. Every 4-6 hours minimum:  Change oxygen saturation probe site  4. Every 4-6 hours:  If on nasal continuous positive airway pressure, respiratory therapy assess nares and determine need for appliance change or resting period.   Outcome: Progressing
Monte Kehr, RN  Outcome: Progressing  Flowsheets (Taken 10/29/2023 1536)  Absence of Physical Injury: Implement safety measures based on patient assessment     Problem: Skin/Tissue Integrity  Goal: Absence of new skin breakdown  Description: 1. Monitor for areas of redness and/or skin breakdown  2. Assess vascular access sites hourly  3. Every 4-6 hours minimum:  Change oxygen saturation probe site  4. Every 4-6 hours:  If on nasal continuous positive airway pressure, respiratory therapy assess nares and determine need for appliance change or resting period. 10/29/2023 2239 by Raul Roe RN  Outcome: Progressing  10/29/2023 1536 by Monte Kehr, RN  Outcome: Progressing  Note: Patient repositions every 2 hours. Heels elevated off of bed. Skin kept clean and dry. Skin assessed with every head to toe. Jose scale complete. Educated on use of CHG soap. Problem: Safety - Adult  Goal: Free from fall injury  10/29/2023 2239 by Raul Roe RN  Outcome: Progressing  10/29/2023 1536 by Monte Kehr, RN  Outcome: Progressing  Flowsheets (Taken 10/29/2023 1536)  Free From Fall Injury:   Instruct family/caregiver on patient safety   Based on caregiver fall risk screen, instruct family/caregiver to ask for assistance with transferring infant if caregiver noted to have fall risk factors     Problem: Respiratory - Adult  Goal: Achieves optimal ventilation and oxygenation  10/29/2023 2239 by Raul Roe RN  Outcome: Progressing  10/29/2023 1536 by Monte Kehr, RN  Outcome: Progressing  Flowsheets (Taken 10/29/2023 1536)  Achieves optimal ventilation and oxygenation:   Assess for changes in respiratory status   Assess for changes in mentation and behavior   Position to facilitate oxygenation and minimize respiratory effort  Note: Patient on RA.       Problem: Skin/Tissue Integrity - Adult  Goal: Incisions, wounds, or drain sites healing without S/S of infection  10/29/2023 2239 by

## 2023-10-30 NOTE — PROGRESS NOTES
Patient discharged at this time. All IV's removed. Discharge instructions, medication changes and follow up appointments explained at this time. All questions answered at this time. AVS given to patient and paperwork signed with this RN. Dr. Vidya Rodgers and Dr. Terrance Ku f/u appointment made and patient aware of dates and times. All patient belongings returned. Chart broken down and placed in yellow bin. Patient taken to lobby in wheelchair. Patient's sister driving him home.

## 2023-10-30 NOTE — PROGRESS NOTES
10/30/23 4724   Encounter Summary   Encounter Overview/Reason  Spiritual/Emotional Needs   Service Provided For: Patient   Referral/Consult From: [address] Family members   Last Encounter  10/30/23   Complexity of Encounter Moderate   Begin Time 0920   End Time  0928   Total Time Calculated 8 min   Spiritual/Emotional needs   Type Spiritual Support   Assessment/Intervention/Outcome   Assessment Calm   Intervention Prayer (assurance of)/[place];Sustaining Presence/Ministry of presence;Nurtured Hope   Outcome Coping     Assessment: In my encounter with the 59 yr old patient, while rounding  the unit 5K,  I provided spiritual care to patient through conversation, I also came to assess the patient's spiritual needs present. The pt was admitted due to multinodular goiter. Interventions:  I provided prayer, emotional support and words of comfort.  provided a listening presence and encouraged pt to share their beliefs and how they support them during their hospitalization. Outcomes: The patient was encouraged and didn't share any further spiritual needs at this time. Plan:  Chaplains will follow-up at a later time for assessment of any spiritual care needs present.

## 2023-10-30 NOTE — PROGRESS NOTES
Neck incision cleansed with CHG. Patient compliant with IS use. Goal is 2000 and patient is achieving. Patient ambulated 400 feet in hallway and tolerated it well. Pain well controlled with Tylenol.

## 2023-10-30 NOTE — CARE COORDINATION
Self    Financial    Payor: BCBS / Plan: [place] / Product Type: *No Product type* /     Does insurance require precert for SNF: Yes    Potential assistance Purchasing Medications: No  Meds-to-Beds request: Yes      [address], [place] - [address]  [facility]  [address] [facility] [address]  Phone: [phone] Fax: [phone]    [facility] ([address] [place] [address]) - [facility] [address] [place] [address] [phone] - f [phone] [address]  [address] [facility] [address]  Phone: [phone] Fax: [address] [phone] [place] [phone]  [address] [facility] [address]  Phone: [phone] Fax: Zane Little [phone] - [address] [place], [place] - [address] [facility] [phone] [place] [phone]  [address] [facility]  [place] [address]  Phone: [phone] Fax: [phone]      Notes:    Factors facilitating achievement of predicted outcomes: Family support, Motivated, Cooperative, Pleasant, Sense of humor, Good insight into deficits, and Has needed Durable Medical Equipment at home    Barriers to discharge: n/a    Additional Case Management Notes: Presented for planned surgery. POD 3. Surgery following. IVF. Pepcid transitioned to PO. Pain and nausea control. Procedure:   10/27 TOTAL THYROIDECTOMY       The Plan for Transition of Care is related to the following treatment goals of Multinodular goiter [E04.2]    Patient Goals/Plan/Treatment Preferences: Spoke with Satish Angelo, he plans to return home alone. Family in area. He is independent and denies needs. Transportation/Food Security/Housekeeping Addressed: No issues identified.      Juanjose Bateman RN  Case Management Department

## 2023-10-31 ENCOUNTER — CARE COORDINATION (OUTPATIENT)
Dept: CASE MANAGEMENT | Age: 58
End: 2023-10-31

## 2023-10-31 ENCOUNTER — TELEPHONE (OUTPATIENT)
Dept: FAMILY MEDICINE CLINIC | Age: 58
End: 2023-10-31

## 2023-10-31 DIAGNOSIS — E04.2 MULTINODULAR GOITER: Primary | ICD-10-CM

## 2023-10-31 DIAGNOSIS — E89.0 S/P TOTAL THYROIDECTOMY: ICD-10-CM

## 2023-10-31 NOTE — CARE COORDINATION
Care Transitions Initial Follow Up Call    Call within 2 business days of discharge: Yes    Patient Current Location:  Home: [address] [facility] Transition Nurse contacted the patient by telephone to perform post hospital discharge assessment. Verified name and  with patient as identifiers. Provided introduction to self, and explanation of the Care Transition Nurse role. Patient: Lucero Almonte Patient : 1965   MRN: [id number]  Reason for Admission: s/p total thyroidectomy   Discharge Date: 10/30/23 RARS: Readmission Risk Score: 7.5      Last Discharge Facility       Date Complaint Diagnosis Description Type Department Provider    10/27/23  Multinodular goiter Admission (Discharged) Bing Lozano MD            Challenges to be reviewed by the provider   Additional needs identified to be addressed with provider: No  none               Method of communication with provider: none. Spoke with \"Jae\", said he is feeling pretty good. Denies fever, chills, chest pain, SOB/LEON, n/v/d. Incision looks good, a little swollen, no s/sx of infection. Was told could take regular showers at home. Using IS as directed. Throat is a little sore but no difficulty swallowing. Eating softer foods, drinking adequate fluids. B&B normal.  Reviewed medications/changes. Will  Synthroid today. Declines PCP f/u since was a planned surgery, will f/u with surgeon, Dr Kathie Ramos . Will see Dr Donte Watt in Dec.  No other issues to report. Denies any other needs. No other questions or concerns at this time. Will continue to follow. Care Transition Nurse reviewed discharge instructions, medical action plan, and red flags with patient who verbalized understanding. The patient was given an opportunity to ask questions and does not have any further questions or concerns at this time. Were discharge instructions available to patient? Yes.  Reviewed appropriate site of

## 2023-10-31 NOTE — DISCHARGE SUMMARY
[place] [address]                               DISCHARGE SUMMARY    PATIENT NAME: Radha East                   :        1965  MED REC NO:   [id number]                           ROOM:       0017  ACCOUNT NO:   [de-identified]                           ADMIT DATE: 10/27/2023  PROVIDER:     Sharmin Sales. Dong Garland MD              [address] [facility] DATE: 10/30/2023    DISCHARGE DIAGNOSIS:  Multinodular goiter. OPERATION:  Total thyroidectomy. COMPLICATIONS:  None. HOSPITAL COURSE:  The patient is a 55-year-old male, patient of Dr. Florencio Dawn, who has a multinodular goiter. He underwent a total  thyroidectomy. Postop, his voice stayed normal.  He did have a mild  lowering of the I-br[place] down to 1.03 at its lowest, but at the time of  discharge, it was up to 1.07. Slightly still abnormal but improving. Hemoglobin was stable. He initially started on clear liquids and then  advanced up to regular diet. On the day of discharge, he was having no  issue swallowing. He had some soreness as expected, so we can discharge  to home. We had contacted Dr. Florencio Dawn office, and he will call in the  prescription for Synthroid. Otherwise, the patient was being discharged  in stable condition. He will follow up in the office. Percocet for  pain, to resume all home medications. IMMANUEL LOVE [facility], MD    D: 10/30/2023 13:35:45       T: 10/31/2023 2:52:33     [email or web address]  Job#: [id number]     Doc#: [id number]    CC:

## 2023-10-31 NOTE — TELEPHONE ENCOUNTER
Care Transitions Initial Follow Up Call    Outreach made within 2 business days of discharge: Yes    Patient: Diane Carrillo Patient : 1965   MRN: [id number]  Reason for Admission: There are no discharge diagnoses documented for the most recent discharge. Discharge Date: 10/30/23       Spoke with: Pt    Discharge department/facility: [facility]    TCM Interactive Patient Contact:  Was patient able to fill all prescriptions: No: pt is picking the meds up today  Was patient instructed to bring all medications to the follow-up visit: Yes  Is patient taking all medications as directed in the discharge summary? Yes  Does patient understand their discharge instructions: Yes  Does patient have questions or concerns that need addressed prior to 7-14 day follow up office visit: no    Scheduled appointment with PCP within 7-14 days. Pt is following up with Endo, if he needs anything from us he will give our office a call.      Follow Up  Future Appointments   Date Time Provider [address]   2023 10:15 AM Addie Shepherd MD AFL [facility] END   3/13/2024 12:30 PM Pauline Botello MD N SRPX Heart 1 [facility]   2024  2:00 PM KANIKA Robles - CNP [facility] - [address] [facility], [address] [facility] ([facility] [facility])

## 2023-11-01 RX ORDER — TIRZEPATIDE 10 MG/.5ML
10 INJECTION, SOLUTION SUBCUTANEOUS WEEKLY
Qty: 2 ML | Refills: 2 | Status: SHIPPED | OUTPATIENT
Start: 2023-11-01

## 2023-11-02 LAB
BACTERIA BLD AEROBE CULT: NORMAL
BACTERIA BLD AEROBE CULT: NORMAL

## 2023-11-04 ENCOUNTER — HOSPITAL ENCOUNTER (EMERGENCY)
Age: 58
Discharge: HOME | End: 2023-11-04
Attending: EMERGENCY MEDICINE
Payer: COMMERCIAL

## 2023-11-04 VITALS
RESPIRATION RATE: 18 BRPM | HEART RATE: 81 BPM | BODY MASS INDEX: 42.66 KG/M2 | WEIGHT: 315 LBS | OXYGEN SATURATION: 96 % | TEMPERATURE: 97.6 F | HEIGHT: 72 IN | DIASTOLIC BLOOD PRESSURE: 65 MMHG | SYSTOLIC BLOOD PRESSURE: 158 MMHG

## 2023-11-04 DIAGNOSIS — T81.89XA DRAINING POSTOPERATIVE WOUND, INITIAL ENCOUNTER: Primary | ICD-10-CM

## 2023-11-04 PROCEDURE — 99284 EMERGENCY DEPT VISIT MOD MDM: CPT

## 2023-11-04 ASSESSMENT — PAIN - FUNCTIONAL ASSESSMENT: PAIN_FUNCTIONAL_ASSESSMENT: NONE - DENIES PAIN

## 2023-11-04 NOTE — ED NOTES
Pt ambulated throughout halls independently, tolerated well. No new drainage noted after walk. New dressing applied.      Corrine Sharp RN  11/04/23 [id number]

## 2023-11-04 NOTE — ED TRIAGE NOTES
Pt arrives to ED from home for c/o wound check post op. Pt states he had his thyroid removed by Dr. Rosalva Man last week. Pt states the dressing over the wound began seeping blood yesterday. Pt states he went to bed last night and just woke up to the dressing saturated with blood. Pt states he takes Brillinta.

## 2023-11-04 NOTE — DISCHARGE INSTRUCTIONS
You were seen for bleeding and draining from your surgical incision site. You had some small wound dehiscence resulting in serosanguineous and blood drainage. It may leak occasionally but hopefully with the bandages that we use today it will no longer leak. Please call Dr. Kae Salmon for a wound check as needed. If you develop redness, thick purulent drainage, difficulty swallowing, or any other concerning symptoms he is return to emergency room for reevaluation.

## 2023-11-06 ENCOUNTER — TELEPHONE (OUTPATIENT)
Dept: FAMILY MEDICINE CLINIC | Age: 58
End: 2023-11-06

## 2023-11-09 ENCOUNTER — CARE COORDINATION (OUTPATIENT)
Dept: CASE MANAGEMENT | Age: 58
End: 2023-11-09

## 2023-11-09 NOTE — CARE COORDINATION
Care Transitions Follow Up Call    Patient Current Location:  Home: [facility] Transition Nurse contacted the patient by telephone to follow up after admission on 10/27/23. Verified name and  with patient as identifiers. Patient: Lea Mahan  Patient : 1965   MRN: [id number]  Reason for Admission: s/p total thyroidectomy   Discharge Date: 23 RARS: Readmission Risk Score: 7.5      Needs to be reviewed by the provider   Additional needs identified to be addressed with provider: No  none             Method of communication with provider: none. Spoke with Nery Artis, said he is doing well. Denies fever, chest pain, SOB/LEON, n/v/d. Went to ED Saturday d/t bloody saturation of dressing from surgery. Said the drainage stopped on Monday. Saw Dr Thierry Salazar today, said incision looks good and can RTO on . Eating, drinking, sleeping ok. No other issues to report. Denies any other needs. No other questions or concerns at this time. Will continue to follow. Addressed changes since last contact:   ED visit  Discussed follow-up appointments. If no appointment was previously scheduled, appointment scheduling offered: Yes. Is follow up appointment scheduled within 7 days of discharge? No.    Follow Up  Future Appointments   Date Time Provider [address]   2023 10:15 AM Silas Harding MD AFL [facility] AFL APEX END   3/13/2024 12:30 PM Desean Mills MD N SRPX Heart [address] [facility] [address]   2024  2:00 PM Sveta Gotti, [facility] [address]     External follow up appointment(s): na    Care Transition Nurse reviewed medical action plan and red flags with patient and discussed any barriers to care and/or understanding of plan of care after discharge. Discussed appropriate site of care based on symptoms and resources available to patient including: PCP  Specialist  Urgent care clinics  When to call Sarah Figueroa.  The patient agrees

## 2023-11-16 ENCOUNTER — CARE COORDINATION (OUTPATIENT)
Dept: CASE MANAGEMENT | Age: 58
End: 2023-11-16

## 2023-11-16 NOTE — CARE COORDINATION
Care Transitions Follow Up Call    Patient Current Location:  Home: [facility] Transition Nurse contacted the patient by telephone to follow up after admission on 10/27/23. Verified name and  with patient as identifiers. Patient: Og Olivares  Patient : 1965   MRN: [id number]  Reason for Admission:  s/p total thyroidectomy   Discharge Date: 23 RARS: Readmission Risk Score: 7.5      Needs to be reviewed by the provider   Additional needs identified to be addressed with provider: No  none             Method of communication with provider: none. Spoke with Deborah Stallworth, said he is doing well. Denies fever, chest pain, SOB/LEON. Incision is healed over, no drainage. Eating, drinking, sleeping well. Will RTO . No other issues to report. Denies any other needs. No other questions or concerns at this time. Doesn't feel necessary to continue calls. Addressed changes since last contact:  none  Discussed follow-up appointments. Follow Up  Future Appointments   Date Time Provider [address]   2023 10:15 AM Hawa Bermudez MD AFL [facility] AFL APEX END   3/13/2024 12:30 PM Fannie Finch MD N SRPX Heart [address] [facility] [address]   2024  2:00 PM Ruth Shaikh, [facility] [address]     External follow up appointment(s): larisa    Care Transition Nurse reviewed medical action plan and red flags with patient and discussed any barriers to care and/or understanding of plan of care after discharge. Discussed appropriate site of care based on symptoms and resources available to patient including: PCP  Specialist  Urgent care clinics  When to call Sarah Figueroa. The patient agrees to contact the PCP office for questions related to their healthcare. Advance Care Planning:   reviewed and current.      Patients top risk factors for readmission: lack of knowledge about disease and medical condition-thyroid surgery, CAD, HTN, DM  Interventions

## 2023-11-21 RX ORDER — RANOLAZINE 500 MG/1
500 TABLET, EXTENDED RELEASE ORAL 2 TIMES DAILY
Qty: 180 TABLET | Refills: 1 | Status: SHIPPED | OUTPATIENT
Start: 2023-11-21

## 2023-11-21 RX ORDER — PANTOPRAZOLE SODIUM 40 MG/1
TABLET, DELAYED RELEASE ORAL
Qty: 180 TABLET | Refills: 3 | Status: SHIPPED | OUTPATIENT
Start: 2023-11-21

## 2023-11-22 ENCOUNTER — NURSE ONLY (OUTPATIENT)
Dept: LAB | Age: 58
End: 2023-11-22

## 2023-11-22 DIAGNOSIS — E03.9 ACQUIRED HYPOTHYROIDISM: ICD-10-CM

## 2023-11-22 LAB
T4 FREE SERPL-MCNC: 1.62 NG/DL (ref 0.93–1.76)
TSH SERPL DL<=0.005 MIU/L-ACNC: 1.56 UIU/ML (ref 0.4–4.2)

## 2023-11-29 RX ORDER — TIRZEPATIDE 10 MG/.5ML
10 INJECTION, SOLUTION SUBCUTANEOUS WEEKLY
Qty: 6 ML | Refills: 3 | Status: SHIPPED | OUTPATIENT
Start: 2023-11-29

## 2023-12-04 ENCOUNTER — OFFICE VISIT (OUTPATIENT)
Age: 58
End: 2023-12-04
Payer: COMMERCIAL

## 2023-12-04 VITALS
HEART RATE: 84 BPM | SYSTOLIC BLOOD PRESSURE: 124 MMHG | DIASTOLIC BLOOD PRESSURE: 66 MMHG | HEIGHT: 72 IN | WEIGHT: 315 LBS | BODY MASS INDEX: 42.66 KG/M2

## 2023-12-04 DIAGNOSIS — E03.9 ACQUIRED HYPOTHYROIDISM: Primary | ICD-10-CM

## 2023-12-04 PROCEDURE — 99204 OFFICE O/P NEW MOD 45 MIN: CPT | Performed by: INTERNAL MEDICINE

## 2023-12-04 RX ORDER — LEVOTHYROXINE SODIUM 175 UG/1
175 TABLET ORAL DAILY
Qty: 90 TABLET | Refills: 1 | Status: SHIPPED | OUTPATIENT
Start: 2023-12-04

## 2023-12-04 NOTE — PROGRESS NOTES
[facility]  [address] [address]  Dept: [phone]  Loc: [phone]     Visit Date: 12/4/2023    Lucero Almonte is a 59 y.o. male who presents today for:  Chief Complaint   Patient presents with    Thyroid Problem     Multinodular thyroid             Subjective:      HPI     Lucero Almonte is a 59 y.o. , male who comes for Follow up for hypothyroidism. Gloria Stone DO  Lucero Almonte was diagnosed with hypothyroidism 5 week(s) ago. Etiology of hypothyroidism is Surgery for multinodular goiter. The pathology report was benign. Current therapy includes levothyroxine 175 mcg daily . The patient had labs 2 weeks ago which showed normal free T4 and TSH. Current symptoms include: denies  Cold intolerance, Depression, Weight Gain, Fatigue, Dry Skin, and Brittle Hair. Past Medical History:   Diagnosis Date    Anxiety attacks     Anxiety due to family issues. Arthritis     Asthma     as child    Atypical chest pain     Cuevas esophagus     Chronic back pain     Coronary artery disease     S/P stent of the LAD by Dr. Shweta Suazo on 2 23 2010. Diabetes mellitus, type 2 ([address])     Erectile dysfunction     Family history of premature CAD     Gastroesophageal reflux disease     Groin hematoma     Herniated disc 2011    back and neck    History of erectile dysfunction 2008    History of giardia infection     History of Giardia. History of iron deficiency     History of pulmonary embolus (PE) 1980's. Remote history of pulmonary embolus and negative computed tomography for pulmonary embolus. History of smoking     Hyperlipidemia     Well managed. Hypogonadism, male     The patient has central hypogonadism.      Joint pain     Morbid obesity     KIRAN (obstructive sleep apnea)     S/P angioplasty     S/P PTCA: 1/12/2015: FFR-guided stenting of distal and mid-LAD using Xience Alpine 2.5X15,

## 2023-12-17 NOTE — PROGRESS NOTES
Hospitalist Progress Note    Patient:  Isiah Almanzar      Unit/Bed:6K-26/026-A    YOB: 1965    MRN: [id number]       Acct: [de-identified]     PCP: Isaias Infante DO    Date of Admission: 1/26/2023    Assessment/Plan:    Acute chest pain/possible unstable angina--ACS ruled out by 3 negative troponins; awaiting cardiology input; on aspirin, statin, beta-blocker; CTA chest did not reveal a PE, no aortic aneurysm or dissection is present; order echocardiogram  Primary hypertension, uncontrolled--on Toprol, monitor  Chronic normocytic anemia--stable  KIRAN--on BiPAP 15/11 per office note dated July 11, 2022, follows with Earl Sharp  Left lobe of the thyroid gland is enlarged and heterogeneous in attenuation--needs outpatient follow-up  12 mm left adrenal adenoma--needs outpatient follow-up  Known hepatomegaly and hepatic steatosis--outpatient follow-up  25 mm simple cyst in the upper pole of the left kidney  CAD S/P JENNIFER to LAD January 12, 2015--on aspirin, statin, beta-blocker  Morbid obesity with BMI 45.98      Expected discharge date: Pending clinical course    Disposition:    [x] Home       [] TCU       [] Rehab       [] Psych       [] SNF       [] [place]       [] Other-    Chief Complaint: Chest pain    Hospital Course: Per H&P done 1/26/2023: \"Ghanshyam Salas is a 59 y.o. male with PMHx of anxiety, CAD, chronic back pain, STEVEN, PE, KIRAN, thyroid nodule who presented to [facility] with chief complaint of chest pain, SOB. Patient reports dyspnea on exertion ongoing for the last few months. He states he is not seen cardiology, as he was waiting until his appointment next month. Patient states that this is new for him, he is now unable to climb a flight of stairs without becoming short of breath. Reports mild lower extremity edema starting over the last month. He states this is worse after work and resolves with leg elevation.   Patient states that he has always slept sitting up, so unclear if any orthopnea. Patient states that today, he was walking into work when he had sudden onset of chest pain that radiated to his back and left arm. Associated with shortness of breath. No lightheadedness, syncope, fever/chills, abdominal pain, N/V/D, urinary symptoms. \"    1/27--> hemodynamically stable however blood pressure is a bit on the higher side prior to medications this morning; echocardiogram ordered, awaiting cardiology input     Subjective (past 24 hours): Denies any complaints at this time, states he has been short of breath with exertion over the past few months and it progressively became worse, currently denies any chest pain, sister at bedside    Medications:  Reviewed    Infusion Medications    sodium chloride       Scheduled Medications    aspirin  81 mg Oral Daily    atorvastatin  40 mg Oral Daily    cetirizine  10 mg Oral Daily    fluticasone  2 spray Each Nostril Daily    metoprolol succinate  25 mg Oral BID    pantoprazole  40 mg Oral BID AC    sodium chloride flush  5-40 mL IntraVENous 2 times per day    enoxaparin  40 mg SubCUTAneous BID     PRN Meds: albuterol sulfate HFA, sodium chloride flush, sodium chloride, ondansetron **OR** ondansetron, polyethylene glycol, acetaminophen **OR** acetaminophen, potassium chloride **OR** potassium alternative oral replacement **OR** potassium chloride, magnesium sulfate    No intake or output data in the 24 hours ending 01/27/23 0625    Diet:  Diet NPO    Exam:  BP (!) 142/68   Pulse 64   Temp 97.7 °F (36.5 °C) (Axillary)   Resp 18   Ht 6' (1.829 m)   Wt (!) 339 lb (153.8 kg)   SpO2 97%   BMI 45.98 kg/m²     General appearance: No apparent distress, appears stated age and cooperative. HEENT: Pupils equal, round, and reactive to light. Conjunctivae/corneas clear. Neck: Supple, with full range of motion. No jugular venous distention. Trachea midline. Respiratory:  Normal respiratory effort.  Clear to auscultation, bilaterally without Rales/Wheezes/Rhonchi. Cardiovascular: Regular rate and rhythm with normal S1/S2 without murmurs, rubs or gallops. Abdomen: Soft, non-tender, non-distended with normal bowel sounds. Obese  Musculoskeletal: passive and active ROM x 4 extremities. Skin: Skin color, texture, turgor normal.    Neurologic:  Neurovascularly intact without any focal sensory/motor deficits. Cranial nerves: II-XII intact, grossly non-focal.  Psychiatric: Alert and oriented, thought content appropriate  Capillary Refill: Brisk,< 3 seconds   Peripheral Pulses: +2 palpable, equal bilaterally       Labs:   Recent Labs     01/26/23  0801 01/27/23  0407   WBC 5.2 5.6   HGB 11.5* 11.9*   HCT 36.7* 38.4*    277     Recent Labs     01/26/23  0801 01/27/23  0407    140   K 4.1 4.0    107   CO2 21* 23   BUN 14 10   CREATININE 0.6 0.6   CALCIUM 7.9* 8.4*     Microbiology:    PTPVR-46 not detected  Influenza not detected    Radiology:  CTA CHEST W WO CONTRAST    Result Date: 1/26/2023  PROCEDURE: CTA CHEST W WO CONTRAST CLINICAL INFORMATION: Chest pain. Arm pain. Back pain. COMPARISON: CTA chest 1/11/2015. TECHNIQUE: 3 mm axial images were obtained through the aorta after the administration of intravenous contrast.  Pre-contrast axial images were also obtained. 3D reconstructions were performed on the scanner to include sagittal and coronal MIP reconstructions through the aorta. All CT scans at this facility use dose modulation, iterative reconstruction, and/or weight based dosing when appropriate to reduce the radiation dose to as low as reasonably achievable. CONTRAST: 80 cc Isovue-370. FINDINGS: Heart/mediastinum: The left lobe of the thyroid gland is enlarged and heterogeneous in attenuation. The heart size is normal. Coronary calcifications are observed. No pericardial effusion is identified. The aorta is not dilated. No aortic aneurysm or dissection is present.  No mediastinal, hilar, or axillary lymphadenopathy is visualized. Although not tailored for the detection of pulmonary arterial filling defects, no filling defects are noted within the pulmonary arterial vasculature to suggest the presence of pulmonary embolism. Lungs: No focal consolidation, pleural effusion, or pneumothorax is visualized. Dependent atelectasis is noted at the lung bases. No pulmonary mass or nodule is observed. The central airways are patent and unremarkable bilaterally. Upper abdomen: A 12 mm left adrenal adenoma is present. The right adrenal gland is unremarkable. Hepatomegaly and hepatic steatosis are unchanged. A 25 mm simple cyst in the upper pole of the left kidney is incompletely visualized. No acute findings are noted in the limited images through the upper abdomen. A small sliding-type hiatal hernia is stable. Musculoskeletal: Multilevel degenerative disc disease is noted in the thoracic spine. The visualized skeletal structures appear intact. Postoperative changes in the left shoulder suggesting prior rotator cuff repair are partially visualized. 1. The thoracic aorta is normal course and caliber. No aortic aneurysm or dissection is present. No acute intrathoracic process is observed. 2. Numerous chronic findings are discussed. **This report has been created using voice recognition software. It may contain minor errors which are inherent in voice recognition technology. ** Final report electronically signed by Dr Betina Cross on 1/26/2023 9:43 AM    XR CHEST PORTABLE    Result Date: 1/26/2023  PROCEDURE: XR CHEST PORTABLE CLINICAL INFORMATION: chest pain, sob COMPARISON: 1/15/2021 TECHNIQUE: AP portable chest radiograph performed. FINDINGS: Platelike subsegmental atelectasis is seen in the right lower lung zone. No focal pulmonary consolidation. Cardiac silhouette is not enlarged. No pleural effusion. No pneumothorax. No acute bony abnormality. 1. No acute cardiopulmonary finding.  2. Platelike subsegmental atelectasis in the right lower lung zone. **This report has been created using voice recognition software. It may contain minor errors which are inherent in voice recognition technology. ** Final report electronically signed by Dr Rachel Rose on 1/26/2023 8:21 AM      DVT prophylaxis: [x] Lovenox                                 [] SCDs                                 [] SQ Heparin                                 [] Encourage ambulation           [] Already on Anticoagulation     Code Status: Full Code    PT/OT Eval Status: Monitor    Tele:   [x] Yes sinus rhythm heart rate 86             [] no    Active Hospital Problems    Diagnosis Date Noted    Chest pain [R07.9] 01/26/2023     Priority: Medium       Electronically signed by KANIKA Sheridan CNP on 1/27/2023 at 6:25 AM No

## 2024-02-06 ENCOUNTER — NURSE ONLY (OUTPATIENT)
Dept: LAB | Age: 59
End: 2024-02-06

## 2024-02-06 ENCOUNTER — OFFICE VISIT (OUTPATIENT)
Dept: FAMILY MEDICINE CLINIC | Age: 59
End: 2024-02-06
Payer: COMMERCIAL

## 2024-02-06 VITALS
RESPIRATION RATE: 16 BRPM | WEIGHT: 311.4 LBS | BODY MASS INDEX: 42.18 KG/M2 | HEART RATE: 80 BPM | HEIGHT: 72 IN | SYSTOLIC BLOOD PRESSURE: 118 MMHG | DIASTOLIC BLOOD PRESSURE: 62 MMHG

## 2024-02-06 DIAGNOSIS — G47.33 OSA TREATED WITH BIPAP: ICD-10-CM

## 2024-02-06 DIAGNOSIS — E11.9 CONTROLLED TYPE 2 DIABETES MELLITUS WITHOUT COMPLICATION, WITHOUT LONG-TERM CURRENT USE OF INSULIN (HCC): ICD-10-CM

## 2024-02-06 DIAGNOSIS — M77.41 METATARSALGIA OF RIGHT FOOT: ICD-10-CM

## 2024-02-06 DIAGNOSIS — E66.01 OBESITY, CLASS III, BMI 40-49.9 (MORBID OBESITY) (HCC): ICD-10-CM

## 2024-02-06 DIAGNOSIS — E03.9 ACQUIRED HYPOTHYROIDISM: ICD-10-CM

## 2024-02-06 DIAGNOSIS — Z00.00 WELL ADULT HEALTH CHECK: ICD-10-CM

## 2024-02-06 DIAGNOSIS — I25.118 ATHEROSCLEROTIC HEART DISEASE OF NATIVE CORONARY ARTERY WITH OTHER FORMS OF ANGINA PECTORIS (HCC): ICD-10-CM

## 2024-02-06 DIAGNOSIS — Z95.5 S/P CORONARY ARTERY STENT PLACEMENT: ICD-10-CM

## 2024-02-06 DIAGNOSIS — Z00.00 WELL ADULT HEALTH CHECK: Primary | ICD-10-CM

## 2024-02-06 DIAGNOSIS — M20.41 HAMMER TOE OF RIGHT FOOT: ICD-10-CM

## 2024-02-06 PROBLEM — I25.10 CAD (CORONARY ARTERY DISEASE): Status: RESOLVED | Noted: 2020-11-03 | Resolved: 2020-11-03

## 2024-02-06 LAB
ALBUMIN SERPL BCG-MCNC: 4 G/DL (ref 3.5–5.1)
ALP SERPL-CCNC: 99 U/L (ref 38–126)
ALT SERPL W/O P-5'-P-CCNC: 15 U/L (ref 11–66)
ANION GAP SERPL CALC-SCNC: 14 MEQ/L (ref 8–16)
AST SERPL-CCNC: 16 U/L (ref 5–40)
BASOPHILS ABSOLUTE: 0 THOU/MM3 (ref 0–0.1)
BASOPHILS NFR BLD AUTO: 0.5 %
BILIRUB SERPL-MCNC: 0.3 MG/DL (ref 0.3–1.2)
BUN SERPL-MCNC: 11 MG/DL (ref 7–22)
CALCIUM SERPL-MCNC: 9.3 MG/DL (ref 8.5–10.5)
CHLORIDE SERPL-SCNC: 108 MEQ/L (ref 98–111)
CHOLEST SERPL-MCNC: 81 MG/DL (ref 100–199)
CO2 SERPL-SCNC: 24 MEQ/L (ref 23–33)
CREAT SERPL-MCNC: 0.7 MG/DL (ref 0.4–1.2)
CREAT UR-MCNC: 196.8 MG/DL
DEPRECATED MEAN GLUCOSE BLD GHB EST-ACNC: 111 MG/DL (ref 70–126)
DEPRECATED RDW RBC AUTO: 52.5 FL (ref 35–45)
EOSINOPHIL NFR BLD AUTO: 2.8 %
EOSINOPHILS ABSOLUTE: 0.2 THOU/MM3 (ref 0–0.4)
ERYTHROCYTE [DISTWIDTH] IN BLOOD BY AUTOMATED COUNT: 16.6 % (ref 11.5–14.5)
GFR SERPL CREATININE-BSD FRML MDRD: > 60 ML/MIN/1.73M2
GLUCOSE SERPL-MCNC: 99 MG/DL (ref 70–108)
HBA1C MFR BLD HPLC: 5.7 % (ref 4.4–6.4)
HCT VFR BLD AUTO: 42.5 % (ref 42–52)
HDLC SERPL-MCNC: 33 MG/DL
HGB BLD-MCNC: 13 GM/DL (ref 14–18)
IMM GRANULOCYTES # BLD AUTO: 0.02 THOU/MM3 (ref 0–0.07)
IMM GRANULOCYTES NFR BLD AUTO: 0.3 %
LDLC SERPL CALC-MCNC: 34 MG/DL
LYMPHOCYTES ABSOLUTE: 1.2 THOU/MM3 (ref 1–4.8)
LYMPHOCYTES NFR BLD AUTO: 18 %
MCH RBC QN AUTO: 26.6 PG (ref 26–33)
MCHC RBC AUTO-ENTMCNC: 30.6 GM/DL (ref 32.2–35.5)
MCV RBC AUTO: 86.9 FL (ref 80–94)
MICROALBUMIN UR-MCNC: < 1.2 MG/DL
MICROALBUMIN/CREAT RATIO PNL UR: 6 MG/G (ref 0–30)
MONOCYTES ABSOLUTE: 0.8 THOU/MM3 (ref 0.4–1.3)
MONOCYTES NFR BLD AUTO: 11.7 %
NEUTROPHILS NFR BLD AUTO: 66.7 %
NRBC BLD AUTO-RTO: 0 /100 WBC
PLATELET # BLD AUTO: 316 THOU/MM3 (ref 130–400)
PMV BLD AUTO: 9.7 FL (ref 9.4–12.4)
POTASSIUM SERPL-SCNC: 4.6 MEQ/L (ref 3.5–5.2)
PROT SERPL-MCNC: 7.2 G/DL (ref 6.1–8)
PSA SERPL-MCNC: 4.81 NG/ML (ref 0–1)
RBC # BLD AUTO: 4.89 MILL/MM3 (ref 4.7–6.1)
SEGMENTED NEUTROPHILS ABSOLUTE COUNT: 4.3 THOU/MM3 (ref 1.8–7.7)
SODIUM SERPL-SCNC: 146 MEQ/L (ref 135–145)
T4 FREE SERPL-MCNC: 1.47 NG/DL (ref 0.93–1.76)
TRIGL SERPL-MCNC: 70 MG/DL (ref 0–199)
TSH SERPL DL<=0.005 MIU/L-ACNC: 0.17 UIU/ML (ref 0.4–4.2)
WBC # BLD AUTO: 6.5 THOU/MM3 (ref 4.8–10.8)

## 2024-02-06 PROCEDURE — 99396 PREV VISIT EST AGE 40-64: CPT | Performed by: FAMILY MEDICINE

## 2024-02-06 ASSESSMENT — PATIENT HEALTH QUESTIONNAIRE - PHQ9
SUM OF ALL RESPONSES TO PHQ QUESTIONS 1-9: 0
1. LITTLE INTEREST OR PLEASURE IN DOING THINGS: 0
SUM OF ALL RESPONSES TO PHQ9 QUESTIONS 1 & 2: 0
2. FEELING DOWN, DEPRESSED OR HOPELESS: 0
SUM OF ALL RESPONSES TO PHQ QUESTIONS 1-9: 0

## 2024-02-06 ASSESSMENT — ENCOUNTER SYMPTOMS
RESPIRATORY NEGATIVE: 1
GASTROINTESTINAL NEGATIVE: 1

## 2024-02-06 NOTE — PATIENT INSTRUCTIONS
You may receive a survey about your visit with us today. The feedback from our patients helps us identify what is working well and where the service to all patients can be enhanced. Thank you!     Tobacco Cessation Programs     Telephonic behavior modification  1-800-QUIT-NOW (977-7005)  Counseling service for those who are ready to quit using tobacco.    Available for uninsured [place] residents, Medicaid recipients, pregnant women, or patients whose health plans or employers are members of the [place] [employer]    Online behavior modification  http://[place].Quitlogix.org  Online support program to help patients through each step of the quitting process.  Available 24 hours a day 7 days a week.  Provides up to date researched based tool, step-by-step guides, and motivational messages.    Online behavior modification  www.lungusa.org/stop-smoking/how-to-quit  HelpLine: 1-800-LUNG-[place] (403-4662)  Email questions to:  questions@[facility].org   Website offers resources to help tobacco users figure out their reasons for quitting and then take the big step of quitting for good.    Hypnosis  Location: [address] [place] [address]  Contact: Rowena Dove, PhD at [phone]  Hypnosis for tobacco cessation  Cost $225 for the initial session and $175 for each session afterwards.  Most patients require 6-8 sessions.  There is the option to submit through the patient’s insurance.    Hypnosis and behavior modification  Location: [address] [place] [address]  Contact: Marni Vanessa, PhD at [phone]  Counseling and hypnosis for nicotine addition  Cost: For uninsured patients:  Please call above phone number  Cost for insured patients depends on patient’s insurance plan.    Behavior modification  Location: [facility], [address] [place] [address]  Contact: [phone]  Services include four one-on-one appointments between the patient and a respiratory therapist.  The four appointments

## 2024-02-06 NOTE — PROGRESS NOTES
2024    Ghanshyam Chavez (:  1965) is a 59 y.o. male, here for a preventive medicine evaluation.  Chief Complaint   Patient presents with    Annual Exam    Foot Pain     Right foot      Annual eval.    Pt c/o right foot pain for the last year.  Has seen Dr. Marely for this in the past, gave him insoles with no relief.  Would like 2nd opinion.    Due for labs.  Lab Results   Component Value Date    LABA1C 6.3 2023    LABA1C 6.1 2022    LABA1C 6.5 (H) 2022     Lab Results   Component Value Date    MALBCR 5 2023    LDLCALC 60 2023    CREATININE 0.6 10/19/2023     BP Readings from Last 3 Encounters:   24 118/62   23 124/66   23 (!) 158/65     Wt Readings from Last 3 Encounters:   24 (!) 141.3 kg (311 lb 6.4 oz)   23 (!) 150 kg (330 lb 12.8 oz)   23 (!) 151.5 kg (334 lb)     Continues to follow closely with Dr. Jackman for his thyroid.    Patient Active Problem List   Diagnosis    Asthma    Gastroesophageal reflux disease    Hyperlipidemia    History of smoking    Chronic back pain    S/P angioplasty    Groin hematoma    Testosterone deficiency    Joint pain    Arthritis    Atypical chest pain    Family history of premature CAD    Hypogonadism, male    Thyroid nodule    History of erectile dysfunction    Male sexual dysfunction    Heart disease    Chest pain with moderate risk of acute coronary syndrome    Atherosclerotic heart disease of native coronary artery with other forms of angina pectoris (HCC)    S/P PTCA: 2015: FFR-guided stenting of distal and mid-LAD using Xience Alpine 2.5X15, post-2.77, and Alpine 2.75X18 mm, post-3.18 mm.     Obesity (BMI 30-39.9)    Obstructive sleep apnea treated with BiPAP    COVID-19    Angina at rest    Unstable angina (HCC)    Transient synovitis of right ankle    Hallux limitus, right    Hammer toe of right foot    Multinodular goiter       Review of Systems   Constitutional: Negative.    HENT:

## 2024-02-13 RX ORDER — NITROGLYCERIN 0.4 MG/1
TABLET SUBLINGUAL
Qty: 100 TABLET | Refills: 3 | Status: SHIPPED | OUTPATIENT
Start: 2024-02-13

## 2024-02-20 ENCOUNTER — TELEPHONE (OUTPATIENT)
Dept: CARDIOLOGY CLINIC | Age: 59
End: 2024-02-20

## 2024-02-20 NOTE — TELEPHONE ENCOUNTER
Pre op Risk Assessment    Procedure right foot surgery   Physician Laurence  Date of surgery/procedure TBD    Last OV 9/23/23 Chon  7/7/23 Dr Irizarry  Last Stress 2018  Last Echo 1/27/23  Last Cath 7/12/23   Last Stent 1/28/23  Is patient on blood thinners ASA and Brilinta  Hold Meds/how many days 5

## 2024-02-26 RX ORDER — TIRZEPATIDE 12.5 MG/.5ML
12.5 INJECTION, SOLUTION SUBCUTANEOUS WEEKLY
Qty: 6 ML | Refills: 3 | Status: SHIPPED | OUTPATIENT
Start: 2024-02-26 | End: 2024-02-27

## 2024-02-27 DIAGNOSIS — E11.9 CONTROLLED TYPE 2 DIABETES MELLITUS WITHOUT COMPLICATION, WITHOUT LONG-TERM CURRENT USE OF INSULIN (HCC): ICD-10-CM

## 2024-02-27 RX ORDER — TIRZEPATIDE 5 MG/.5ML
5 INJECTION, SOLUTION SUBCUTANEOUS WEEKLY
Qty: 6 ML | Refills: 3 | Status: SHIPPED | OUTPATIENT
Start: 2024-02-27

## 2024-03-01 ENCOUNTER — PATIENT MESSAGE (OUTPATIENT)
Dept: FAMILY MEDICINE CLINIC | Age: 59
End: 2024-03-01

## 2024-03-02 ENCOUNTER — HOSPITAL ENCOUNTER (OUTPATIENT)
Dept: GENERAL RADIOLOGY | Age: 59
Discharge: HOME | End: 2024-03-02
Payer: COMMERCIAL

## 2024-03-02 ENCOUNTER — HOSPITAL ENCOUNTER (OUTPATIENT)
Age: 59
Discharge: HOME | End: 2024-03-02
Payer: COMMERCIAL

## 2024-03-02 DIAGNOSIS — Z01.811 PRE-OP CHEST EXAM: ICD-10-CM

## 2024-03-02 PROCEDURE — 71046 X-RAY EXAM CHEST 2 VIEWS: CPT

## 2024-03-04 ENCOUNTER — OFFICE VISIT (OUTPATIENT)
Age: 59
End: 2024-03-04
Payer: COMMERCIAL

## 2024-03-04 VITALS
DIASTOLIC BLOOD PRESSURE: 70 MMHG | SYSTOLIC BLOOD PRESSURE: 110 MMHG | RESPIRATION RATE: 16 BRPM | BODY MASS INDEX: 41.99 KG/M2 | WEIGHT: 310 LBS | HEIGHT: 72 IN | HEART RATE: 77 BPM

## 2024-03-04 DIAGNOSIS — E03.9 ACQUIRED HYPOTHYROIDISM: Primary | ICD-10-CM

## 2024-03-04 PROCEDURE — 99213 OFFICE O/P EST LOW 20 MIN: CPT | Performed by: INTERNAL MEDICINE

## 2024-03-04 RX ORDER — DEXTROMETHORPHAN HYDROBROMIDE AND PROMETHAZINE HYDROCHLORIDE 15; 6.25 MG/5ML; MG/5ML
5 SYRUP ORAL 4 TIMES DAILY PRN
Qty: 120 ML | Refills: 0 | Status: SHIPPED | OUTPATIENT
Start: 2024-03-04 | End: 2024-03-10

## 2024-03-04 ASSESSMENT — ENCOUNTER SYMPTOMS
ABDOMINAL PAIN: 0
SHORTNESS OF BREATH: 0
COLOR CHANGE: 0

## 2024-03-04 NOTE — PROGRESS NOTES
140.6 kg (310 lb)   BMI 42.04 kg/m²   Body surface area is 2.67 meters squared.      Physical Exam   General: alert, appears stated age, cooperative and no distress   Eyes: negative findings: lids and lashes normal, conjunctivae and sclerae normal, corneas clear and pupils equal, round, reactive to light and accomodation  Neck:no adenopathy, no carotid bruit, no JVD and supple, symmetrical, trachea midline  Thyroid: There is no goiter or thyroid tenderness.  Lung:clear to auscultation bilaterally  Heart: regular rate and rhythm, S1, S2 normal, no murmur, click, rub or gallop and normal apical impulse  Abdomen:soft, non-tender; bowel sounds normal; no masses,  no organomegaly  Extremities:extremities normal, atraumatic, no cyanosis or edema and Homans sign is negative, no sign of DVT  Pulses:2+ and symmetric  Skin:warm and dry, no hyperpigmentation, vitiligo, or suspicious lesions  Neuro:normal without focal findings, mental status, speech normal, alert and oriented x3, VEENA, cranial nerves 2-12 intact, muscle tone and strength normal and symmetric.  Lymph nodes: There is no cervical, supraclavicular or submental adenopathy.  Musculoskeletal:  No joint swelling or deformity.  Psychiatry: Alert and oriented ×3.  Making good eye contact.  Affect is normal.        Assessment/Plan:    1. Acquired hypothyroidism: Due to thyroidectomy for multinodular goiter.  Pathology was benign.  Clinically asymptomatic.  TFTs 2/6/24: TSH 0.169, fT4 1.47.  TSH decreased from previous without dose adjustment. He has been stable on 175mcg daily for an extended period of time. Will re-check TSH/fT4 at this time and consider further dose adjustment pending results  Continue levothyroxine 175 mcg daily at this time     Orders Placed This Encounter   Procedures    TSH     Standing Status:   Future     Standing Expiration Date:   3/4/2025    T4, Free     Standing Status:   Future     Standing Expiration Date:   3/4/2025           The risks

## 2024-03-05 ENCOUNTER — TELEMEDICINE (OUTPATIENT)
Dept: FAMILY MEDICINE CLINIC | Age: 59
End: 2024-03-05
Payer: COMMERCIAL

## 2024-03-05 DIAGNOSIS — J22 LRTI (LOWER RESPIRATORY TRACT INFECTION): Primary | ICD-10-CM

## 2024-03-05 PROCEDURE — 99213 OFFICE O/P EST LOW 20 MIN: CPT | Performed by: FAMILY MEDICINE

## 2024-03-05 PROCEDURE — G2211 COMPLEX E/M VISIT ADD ON: HCPCS | Performed by: FAMILY MEDICINE

## 2024-03-05 RX ORDER — ALBUTEROL SULFATE 90 UG/1
2 AEROSOL, METERED RESPIRATORY (INHALATION) 4 TIMES DAILY PRN
Qty: 18 G | Refills: 0 | Status: SHIPPED | OUTPATIENT
Start: 2024-03-05

## 2024-03-05 RX ORDER — DOXYCYCLINE HYCLATE 100 MG
100 TABLET ORAL 2 TIMES DAILY
Qty: 14 TABLET | Refills: 0 | Status: SHIPPED | OUTPATIENT
Start: 2024-03-05 | End: 2024-03-12

## 2024-03-05 ASSESSMENT — ENCOUNTER SYMPTOMS
GASTROINTESTINAL NEGATIVE: 1
CHEST TIGHTNESS: 1
COUGH: 1
SHORTNESS OF BREATH: 1

## 2024-03-05 NOTE — PROGRESS NOTES
Ghanshyam Chavez (:  1965) is a Established patient, here for evaluation of the following:    Subjective   HPI:    Chief Complaint   Patient presents with    Congestion    Cough     Pt presents with c/o cough, congestion for almost a week now.  Chest is tight and he is SOB with exertion.  Denies CP.  Cough is productive.    Fever in the beginning of the illness.    CXR over the weekend for Dr. Dang below:    IMPRESSION:  1. Elevated right hemidiaphragm and atelectasis or infiltrate at the right lung base.  2. Cardiomegaly.  3. Postoperative changes involving the left humeral head.       Patient Active Problem List   Diagnosis    Asthma    Gastroesophageal reflux disease    Hyperlipidemia    History of smoking    Chronic back pain    S/P angioplasty    Groin hematoma    Testosterone deficiency    Joint pain    Arthritis    Atypical chest pain    Family history of premature CAD    Hypogonadism, male    Thyroid nodule    History of erectile dysfunction    Male sexual dysfunction    Heart disease    Chest pain with moderate risk of acute coronary syndrome    Atherosclerotic heart disease of native coronary artery with other forms of angina pectoris (HCC)    S/P PTCA: 2015: FFR-guided stenting of distal and mid-LAD using Xience Alpine 2.5X15, post-2.77, and Alpine 2.75X18 mm, post-3.18 mm.     Obesity (BMI 30-39.9)    Obstructive sleep apnea treated with BiPAP    COVID-19    Angina at rest    Unstable angina (HCC)    Transient synovitis of right ankle    Hallux limitus, right    Hammer toe of right foot    Multinodular goiter     Past Surgical History:   Procedure Laterality Date    APPENDECTOMY      BACK SURGERY      X 2 FOR HERNIATED DISCS    CARDIOVASCULAR STRESS TEST  2010    Mild chest discomfort induced by IV Lexiscan that resolved spontaneously. No arrhythmias. No EKG changes to suggest ischemia.     CARPAL TUNNEL RELEASE      COLONOSCOPY      CORONARY ANGIOPLASTY      TOTAL OF 4

## 2024-03-05 NOTE — TELEPHONE ENCOUNTER
From: Ghanshyam Chavez  To: Dr. Markus Carvajal  Sent: 3/1/2024 1:23 PM EST  Subject: New medicine     Can you call me in a prescription for some cough medicine to [facility]

## 2024-03-07 ENCOUNTER — NURSE ONLY (OUTPATIENT)
Dept: LAB | Age: 59
End: 2024-03-07

## 2024-03-07 DIAGNOSIS — E03.9 ACQUIRED HYPOTHYROIDISM: ICD-10-CM

## 2024-03-07 LAB
T4 FREE SERPL-MCNC: 1.74 NG/DL (ref 0.93–1.68)
TSH SERPL DL<=0.005 MIU/L-ACNC: 0.11 UIU/ML (ref 0.4–4.2)

## 2024-03-12 ENCOUNTER — ANESTHESIA EVENT (OUTPATIENT)
Dept: OPERATING ROOM | Age: 59
End: 2024-03-12
Payer: COMMERCIAL

## 2024-03-12 ENCOUNTER — OFFICE VISIT (OUTPATIENT)
Dept: FAMILY MEDICINE CLINIC | Age: 59
End: 2024-03-12
Payer: COMMERCIAL

## 2024-03-12 VITALS
WEIGHT: 307.1 LBS | HEIGHT: 72 IN | RESPIRATION RATE: 16 BRPM | BODY MASS INDEX: 41.6 KG/M2 | HEART RATE: 88 BPM | DIASTOLIC BLOOD PRESSURE: 64 MMHG | SYSTOLIC BLOOD PRESSURE: 122 MMHG

## 2024-03-12 DIAGNOSIS — Z95.5 S/P CORONARY ARTERY STENT PLACEMENT: ICD-10-CM

## 2024-03-12 DIAGNOSIS — M20.11 ACQUIRED HALLUX VALGUS OF RIGHT FOOT: ICD-10-CM

## 2024-03-12 DIAGNOSIS — M20.41 ACQUIRED HAMMER TOE OF RIGHT FOOT: ICD-10-CM

## 2024-03-12 DIAGNOSIS — G47.33 OSA TREATED WITH BIPAP: ICD-10-CM

## 2024-03-12 DIAGNOSIS — E11.9 CONTROLLED TYPE 2 DIABETES MELLITUS WITHOUT COMPLICATION, WITHOUT LONG-TERM CURRENT USE OF INSULIN (HCC): ICD-10-CM

## 2024-03-12 DIAGNOSIS — I25.118 ATHEROSCLEROTIC HEART DISEASE OF NATIVE CORONARY ARTERY WITH OTHER FORMS OF ANGINA PECTORIS (HCC): ICD-10-CM

## 2024-03-12 DIAGNOSIS — M24.574 CONTRACTURE OF JOINT OF RIGHT FOOT: ICD-10-CM

## 2024-03-12 DIAGNOSIS — Z01.818 PRE-OP EVALUATION: Primary | ICD-10-CM

## 2024-03-12 DIAGNOSIS — E66.01 MORBID OBESITY (HCC): ICD-10-CM

## 2024-03-12 PROCEDURE — 99214 OFFICE O/P EST MOD 30 MIN: CPT | Performed by: FAMILY MEDICINE

## 2024-03-12 PROCEDURE — 3044F HG A1C LEVEL LT 7.0%: CPT | Performed by: FAMILY MEDICINE

## 2024-03-12 SDOH — ECONOMIC STABILITY: FOOD INSECURITY: WITHIN THE PAST 12 MONTHS, THE FOOD YOU BOUGHT JUST DIDN'T LAST AND YOU DIDN'T HAVE MONEY TO GET MORE.: NEVER TRUE

## 2024-03-12 SDOH — ECONOMIC STABILITY: INCOME INSECURITY: HOW HARD IS IT FOR YOU TO PAY FOR THE VERY BASICS LIKE FOOD, HOUSING, MEDICAL CARE, AND HEATING?: NOT HARD AT ALL

## 2024-03-12 SDOH — ECONOMIC STABILITY: FOOD INSECURITY: WITHIN THE PAST 12 MONTHS, YOU WORRIED THAT YOUR FOOD WOULD RUN OUT BEFORE YOU GOT MONEY TO BUY MORE.: NEVER TRUE

## 2024-03-12 ASSESSMENT — ENCOUNTER SYMPTOMS
RESPIRATORY NEGATIVE: 1
GASTROINTESTINAL NEGATIVE: 1

## 2024-03-12 NOTE — PROGRESS NOTES
Ghanshyam Chavez (:  1965) is a 59 y.o. male,Established patient, here for evaluation of the following chief complaint(s):  Pre-op Exam (Right foot 3/20/24 Dr. Dang)          Subjective   SUBJECTIVE/OBJECTIVE:  HPI  Chief Complaint   Patient presents with    Pre-op Exam     Right foot 3/20/24 Dr. Dang     Pre-op evaluation.  Scheduled for right foot surgery with Dr. Dang on 3/20.    Pt denies CP, chest tightness, SOB.  Able to perform 4 METs with no cardiac symptoms.  Cleared by Cardiology.    Sugars well controlled.  Lab Results   Component Value Date    LABA1C 5.7 2024    LABA1C 6.3 2023    LABA1C 6.1 2022     Lab Results   Component Value Date    MALBCR 6 2024    LDLCALC 34 2024    CREATININE 0.7 2024     BPs and weight stable.  BP Readings from Last 3 Encounters:   24 122/64   24 110/70   24 118/62     Wt Readings from Last 3 Encounters:   24 (!) 139.3 kg (307 lb 1.6 oz)   24 (!) 140.6 kg (310 lb)   24 (!) 141.3 kg (311 lb 6.4 oz)     Denies hx of general anesthesia complications.    Patient Active Problem List   Diagnosis    Asthma    Gastroesophageal reflux disease    Hyperlipidemia    History of smoking    Chronic back pain    S/P angioplasty    Groin hematoma    Testosterone deficiency    Joint pain    Arthritis    Atypical chest pain    Family history of premature CAD    Hypogonadism, male    Thyroid nodule    History of erectile dysfunction    Male sexual dysfunction    Heart disease    Chest pain with moderate risk of acute coronary syndrome    Atherosclerotic heart disease of native coronary artery with other forms of angina pectoris (HCC)    S/P PTCA: 2015: FFR-guided stenting of distal and mid-LAD using Xience Alpine 2.5X15, post-2.77, and Alpine 2.75X18 mm, post-3.18 mm.     Obesity (BMI 30-39.9)    Obstructive sleep apnea treated with BiPAP    COVID-19    Angina at rest    Unstable angina (HCC)

## 2024-03-12 NOTE — PROGRESS NOTES
Dr. Irizarry cleared as moderate risk, information given to anesthesia for review. Per Dr. Felipe brooks to proceed at the surgery center 3/20

## 2024-03-13 ENCOUNTER — OFFICE VISIT (OUTPATIENT)
Dept: CARDIOLOGY CLINIC | Age: 59
End: 2024-03-13
Payer: COMMERCIAL

## 2024-03-13 VITALS
HEIGHT: 72 IN | SYSTOLIC BLOOD PRESSURE: 118 MMHG | BODY MASS INDEX: 41.85 KG/M2 | DIASTOLIC BLOOD PRESSURE: 60 MMHG | HEART RATE: 80 BPM | WEIGHT: 309 LBS

## 2024-03-13 DIAGNOSIS — I10 PRIMARY HYPERTENSION: ICD-10-CM

## 2024-03-13 DIAGNOSIS — I25.10 CORONARY ARTERY DISEASE INVOLVING NATIVE CORONARY ARTERY OF NATIVE HEART WITHOUT ANGINA PECTORIS: Primary | ICD-10-CM

## 2024-03-13 PROCEDURE — 99213 OFFICE O/P EST LOW 20 MIN: CPT | Performed by: NUCLEAR MEDICINE

## 2024-03-13 PROCEDURE — 3078F DIAST BP <80 MM HG: CPT | Performed by: NUCLEAR MEDICINE

## 2024-03-13 PROCEDURE — 3074F SYST BP LT 130 MM HG: CPT | Performed by: NUCLEAR MEDICINE

## 2024-03-13 RX ORDER — PANTOPRAZOLE SODIUM 40 MG/1
40 TABLET, DELAYED RELEASE ORAL 2 TIMES DAILY
Qty: 180 TABLET | Refills: 3 | Status: SHIPPED | OUTPATIENT
Start: 2024-03-13

## 2024-03-13 RX ORDER — LISINOPRIL 5 MG/1
5 TABLET ORAL DAILY
Qty: 90 TABLET | Refills: 3 | Status: SHIPPED | OUTPATIENT
Start: 2024-03-13

## 2024-03-13 RX ORDER — METOPROLOL SUCCINATE 25 MG/1
TABLET, EXTENDED RELEASE ORAL
Qty: 180 TABLET | Refills: 3 | Status: SHIPPED | OUTPATIENT
Start: 2024-03-13

## 2024-03-13 RX ORDER — ATORVASTATIN CALCIUM 40 MG/1
40 TABLET, FILM COATED ORAL DAILY
Qty: 90 TABLET | Refills: 3 | Status: SHIPPED | OUTPATIENT
Start: 2024-03-13

## 2024-03-13 RX ORDER — RANOLAZINE 500 MG/1
500 TABLET, EXTENDED RELEASE ORAL 2 TIMES DAILY
Qty: 180 TABLET | Refills: 3 | Status: SHIPPED | OUTPATIENT
Start: 2024-03-13

## 2024-03-13 RX ORDER — ISOSORBIDE MONONITRATE 30 MG/1
30 TABLET, EXTENDED RELEASE ORAL DAILY
Qty: 90 TABLET | Refills: 3 | Status: SHIPPED | OUTPATIENT
Start: 2024-03-13

## 2024-03-13 NOTE — PROGRESS NOTES
Patient here for check up.  Patient denies any cardiac symptoms.   
Diagnosis Orders   1. Coronary artery disease involving native coronary artery of native heart without angina pectoris        2. Primary hypertension        As above  Cardiac fair for now    Plan:  No follow-ups on file.  As above  Continue risk factor modification and medical management  Thank you for allowing me to participate in the care of your patient. Please don't hesitate to contact me regarding any further issues related to the patient care    Orders Placed:  No orders of the defined types were placed in this encounter.      Prescribed:  No orders of the defined types were placed in this encounter.         Discussed use, benefit, and side effects of prescribed medications. All patient questions answered. Pt voicedunderstanding. Instructed to continue current medications, diet and exercise. Continue risk factor modification and medical management. Patient agreed with treatment plan. Follow up as directed.    Electronically signedby Yousif Pop MD on 3/13/2024 at 12:42 PM

## 2024-03-14 NOTE — PROGRESS NOTES
Bring CPAP  NPO after midnight  Bring insurance info and drivers license  Wear comfortable clean clothing  Do not bring jewelry  Shower night before and morning of surgery with a liquid antibacterial soap  Bring list of medications with dosage and how often taken  Follow all instructions given by your physician   needed at discharge  You must have a responsible adult with you day of surgery and for 24 hours after surgery  Call [phone] for any questions

## 2024-03-20 ENCOUNTER — ANESTHESIA (OUTPATIENT)
Dept: OPERATING ROOM | Age: 59
End: 2024-03-20
Payer: COMMERCIAL

## 2024-03-20 ENCOUNTER — HOSPITAL ENCOUNTER (OUTPATIENT)
Age: 59
Setting detail: OUTPATIENT SURGERY
Discharge: HOME | End: 2024-03-20
Attending: PODIATRIST | Admitting: PODIATRIST
Payer: COMMERCIAL

## 2024-03-20 VITALS
RESPIRATION RATE: 13 BRPM | HEART RATE: 69 BPM | HEIGHT: 72 IN | TEMPERATURE: 98 F | BODY MASS INDEX: 42.18 KG/M2 | DIASTOLIC BLOOD PRESSURE: 56 MMHG | WEIGHT: 311.4 LBS | SYSTOLIC BLOOD PRESSURE: 109 MMHG | OXYGEN SATURATION: 94 %

## 2024-03-20 LAB — GLUCOSE BLD STRIP.AUTO-MCNC: 104 MG/DL (ref 70–108)

## 2024-03-20 PROCEDURE — 6360000002 HC RX W HCPCS

## 2024-03-20 PROCEDURE — C1713 ANCHOR/SCREW BN/BN,TIS/BN: HCPCS | Performed by: PODIATRIST

## 2024-03-20 PROCEDURE — 7100000001 HC PACU RECOVERY - ADDTL 15 MIN: Performed by: PODIATRIST

## 2024-03-20 PROCEDURE — 3700000001 HC ADD 15 MINUTES (ANESTHESIA): Performed by: PODIATRIST

## 2024-03-20 PROCEDURE — 2500000003 HC RX 250 WO HCPCS: Performed by: PODIATRIST

## 2024-03-20 PROCEDURE — 2709999900 HC NON-CHARGEABLE SUPPLY: Performed by: PODIATRIST

## 2024-03-20 PROCEDURE — 6360000002 HC RX W HCPCS: Performed by: PODIATRIST

## 2024-03-20 PROCEDURE — 82948 REAGENT STRIP/BLOOD GLUCOSE: CPT

## 2024-03-20 PROCEDURE — 3700000000 HC ANESTHESIA ATTENDED CARE: Performed by: PODIATRIST

## 2024-03-20 PROCEDURE — 7100000010 HC PHASE II RECOVERY - FIRST 15 MIN: Performed by: PODIATRIST

## 2024-03-20 PROCEDURE — 6360000002 HC RX W HCPCS: Performed by: NURSE ANESTHETIST, CERTIFIED REGISTERED

## 2024-03-20 PROCEDURE — 7100000011 HC PHASE II RECOVERY - ADDTL 15 MIN: Performed by: PODIATRIST

## 2024-03-20 PROCEDURE — 3600000013 HC SURGERY LEVEL 3 ADDTL 15MIN: Performed by: PODIATRIST

## 2024-03-20 PROCEDURE — 2720000010 HC SURG SUPPLY STERILE: Performed by: PODIATRIST

## 2024-03-20 PROCEDURE — 2580000003 HC RX 258: Performed by: NURSE ANESTHETIST, CERTIFIED REGISTERED

## 2024-03-20 PROCEDURE — 2500000003 HC RX 250 WO HCPCS: Performed by: NURSE ANESTHETIST, CERTIFIED REGISTERED

## 2024-03-20 PROCEDURE — 3600000003 HC SURGERY LEVEL 3 BASE: Performed by: PODIATRIST

## 2024-03-20 PROCEDURE — C1776 JOINT DEVICE (IMPLANTABLE): HCPCS | Performed by: PODIATRIST

## 2024-03-20 PROCEDURE — 7100000000 HC PACU RECOVERY - FIRST 15 MIN: Performed by: PODIATRIST

## 2024-03-20 DEVICE — MINI MONSTER 4.0 X 30MM HEADLESS SCREW, SHORT THREAD
Type: IMPLANTABLE DEVICE | Status: FUNCTIONAL
Brand: MONSTER SCREW SYSTEM

## 2024-03-20 DEVICE — STRAIGHT STAPLE ASSEMBLY, 10 X 10MM
Type: IMPLANTABLE DEVICE | Status: FUNCTIONAL
Brand: JAWS NITINOL STAPLE SYSTEM

## 2024-03-20 DEVICE — HAMMERTUBE™ SYSTEM IMPLANT KIT, Ø3.50 X 16MM, 0°, STERILE
Type: IMPLANTABLE DEVICE | Status: FUNCTIONAL
Brand: HAMMERTUBE™ SYSTEM

## 2024-03-20 RX ORDER — MORPHINE SULFATE 2 MG/ML
4 INJECTION, SOLUTION INTRAMUSCULAR; INTRAVENOUS
Status: DISCONTINUED | OUTPATIENT
Start: 2024-03-20 | End: 2024-03-20 | Stop reason: HOSPADM

## 2024-03-20 RX ORDER — SODIUM CHLORIDE 0.9 % (FLUSH) 0.9 %
5-40 SYRINGE (ML) INJECTION PRN
Status: DISCONTINUED | OUTPATIENT
Start: 2024-03-20 | End: 2024-03-20 | Stop reason: HOSPADM

## 2024-03-20 RX ORDER — OXYCODONE HYDROCHLORIDE 5 MG/1
5 TABLET ORAL EVERY 4 HOURS PRN
Status: DISCONTINUED | OUTPATIENT
Start: 2024-03-20 | End: 2024-03-20 | Stop reason: HOSPADM

## 2024-03-20 RX ORDER — LIDOCAINE HYDROCHLORIDE AND EPINEPHRINE 10; 10 MG/ML; UG/ML
INJECTION, SOLUTION INFILTRATION; PERINEURAL PRN
Status: DISCONTINUED | OUTPATIENT
Start: 2024-03-20 | End: 2024-03-20 | Stop reason: ALTCHOICE

## 2024-03-20 RX ORDER — LIDOCAINE HYDROCHLORIDE 20 MG/ML
INJECTION, SOLUTION EPIDURAL; INFILTRATION; INTRACAUDAL; PERINEURAL PRN
Status: DISCONTINUED | OUTPATIENT
Start: 2024-03-20 | End: 2024-03-20 | Stop reason: SDUPTHER

## 2024-03-20 RX ORDER — FENTANYL CITRATE 50 UG/ML
25 INJECTION, SOLUTION INTRAMUSCULAR; INTRAVENOUS EVERY 5 MIN PRN
Status: DISCONTINUED | OUTPATIENT
Start: 2024-03-20 | End: 2024-03-20 | Stop reason: HOSPADM

## 2024-03-20 RX ORDER — MORPHINE SULFATE 2 MG/ML
2 INJECTION, SOLUTION INTRAMUSCULAR; INTRAVENOUS
Status: DISCONTINUED | OUTPATIENT
Start: 2024-03-20 | End: 2024-03-20 | Stop reason: HOSPADM

## 2024-03-20 RX ORDER — SODIUM CHLORIDE 0.9 % (FLUSH) 0.9 %
5-40 SYRINGE (ML) INJECTION EVERY 12 HOURS SCHEDULED
Status: DISCONTINUED | OUTPATIENT
Start: 2024-03-20 | End: 2024-03-20 | Stop reason: HOSPADM

## 2024-03-20 RX ORDER — SODIUM CHLORIDE 9 MG/ML
INJECTION, SOLUTION INTRAVENOUS PRN
Status: DISCONTINUED | OUTPATIENT
Start: 2024-03-20 | End: 2024-03-20 | Stop reason: HOSPADM

## 2024-03-20 RX ORDER — SODIUM CHLORIDE 9 MG/ML
INJECTION, SOLUTION INTRAVENOUS CONTINUOUS
Status: DISCONTINUED | OUTPATIENT
Start: 2024-03-20 | End: 2024-03-20 | Stop reason: HOSPADM

## 2024-03-20 RX ORDER — NALOXONE HYDROCHLORIDE 0.4 MG/ML
INJECTION, SOLUTION INTRAMUSCULAR; INTRAVENOUS; SUBCUTANEOUS PRN
Status: DISCONTINUED | OUTPATIENT
Start: 2024-03-20 | End: 2024-03-20 | Stop reason: HOSPADM

## 2024-03-20 RX ORDER — PROPOFOL 10 MG/ML
INJECTION, EMULSION INTRAVENOUS PRN
Status: DISCONTINUED | OUTPATIENT
Start: 2024-03-20 | End: 2024-03-20 | Stop reason: SDUPTHER

## 2024-03-20 RX ORDER — ROCURONIUM BROMIDE 10 MG/ML
INJECTION, SOLUTION INTRAVENOUS PRN
Status: DISCONTINUED | OUTPATIENT
Start: 2024-03-20 | End: 2024-03-20 | Stop reason: SDUPTHER

## 2024-03-20 RX ORDER — ONDANSETRON 2 MG/ML
INJECTION INTRAMUSCULAR; INTRAVENOUS PRN
Status: DISCONTINUED | OUTPATIENT
Start: 2024-03-20 | End: 2024-03-20 | Stop reason: SDUPTHER

## 2024-03-20 RX ORDER — BUPIVACAINE HYDROCHLORIDE 5 MG/ML
INJECTION, SOLUTION EPIDURAL; INTRACAUDAL PRN
Status: DISCONTINUED | OUTPATIENT
Start: 2024-03-20 | End: 2024-03-20 | Stop reason: ALTCHOICE

## 2024-03-20 RX ORDER — DEXAMETHASONE SODIUM PHOSPHATE 10 MG/ML
INJECTION, EMULSION INTRAMUSCULAR; INTRAVENOUS PRN
Status: DISCONTINUED | OUTPATIENT
Start: 2024-03-20 | End: 2024-03-20 | Stop reason: SDUPTHER

## 2024-03-20 RX ORDER — FENTANYL CITRATE 50 UG/ML
50 INJECTION, SOLUTION INTRAMUSCULAR; INTRAVENOUS EVERY 5 MIN PRN
Status: DISCONTINUED | OUTPATIENT
Start: 2024-03-20 | End: 2024-03-20 | Stop reason: HOSPADM

## 2024-03-20 RX ORDER — SODIUM CHLORIDE 9 MG/ML
INJECTION, SOLUTION INTRAVENOUS CONTINUOUS PRN
Status: DISCONTINUED | OUTPATIENT
Start: 2024-03-20 | End: 2024-03-20 | Stop reason: SDUPTHER

## 2024-03-20 RX ORDER — FENTANYL CITRATE 50 UG/ML
INJECTION, SOLUTION INTRAMUSCULAR; INTRAVENOUS PRN
Status: DISCONTINUED | OUTPATIENT
Start: 2024-03-20 | End: 2024-03-20 | Stop reason: SDUPTHER

## 2024-03-20 RX ORDER — OXYCODONE HYDROCHLORIDE 5 MG/1
10 TABLET ORAL EVERY 4 HOURS PRN
Status: DISCONTINUED | OUTPATIENT
Start: 2024-03-20 | End: 2024-03-20 | Stop reason: HOSPADM

## 2024-03-20 RX ADMIN — LIDOCAINE HYDROCHLORIDE 100 MG: 20 INJECTION, SOLUTION EPIDURAL; INFILTRATION; INTRACAUDAL; PERINEURAL at 13:09

## 2024-03-20 RX ADMIN — SUGAMMADEX 200 MG: 100 INJECTION, SOLUTION INTRAVENOUS at 14:26

## 2024-03-20 RX ADMIN — ONDANSETRON 4 MG: 2 INJECTION INTRAMUSCULAR; INTRAVENOUS at 14:26

## 2024-03-20 RX ADMIN — DEXAMETHASONE SODIUM PHOSPHATE 6 MG: 10 INJECTION, EMULSION INTRAMUSCULAR; INTRAVENOUS at 13:12

## 2024-03-20 RX ADMIN — SODIUM CHLORIDE: 9 INJECTION, SOLUTION INTRAVENOUS at 13:07

## 2024-03-20 RX ADMIN — FENTANYL CITRATE 100 MCG: 50 INJECTION, SOLUTION INTRAMUSCULAR; INTRAVENOUS at 13:54

## 2024-03-20 RX ADMIN — FENTANYL CITRATE 100 MCG: 50 INJECTION, SOLUTION INTRAMUSCULAR; INTRAVENOUS at 13:09

## 2024-03-20 RX ADMIN — Medication 3000 MG: at 13:12

## 2024-03-20 RX ADMIN — PROPOFOL 170 MG: 10 INJECTION, EMULSION INTRAVENOUS at 13:09

## 2024-03-20 RX ADMIN — ROCURONIUM BROMIDE 50 MG: 10 INJECTION INTRAVENOUS at 13:09

## 2024-03-20 ASSESSMENT — PAIN SCALES - GENERAL
PAINLEVEL_OUTOF10: 0
PAINLEVEL_OUTOF10: 0

## 2024-03-20 ASSESSMENT — PAIN - FUNCTIONAL ASSESSMENT: PAIN_FUNCTIONAL_ASSESSMENT: 0-10

## 2024-03-20 ASSESSMENT — PAIN DESCRIPTION - DESCRIPTORS: DESCRIPTORS: ACHING

## 2024-03-20 NOTE — H&P
[facility]  History and Physical Update    Pt Name: Ghanshyam Chavez  MRN: [id number]  YOB: 1965  Date of evaluation: 3/20/2024    [x] I have examined the patient and reviewed the H&P/Consult and there are no changes to the patient or plans.    [] I have examined the patient and reviewed the H&P/Consult and have noted the following changes:        Davon Dang DPM DPM  Electronically signed 3/20/2024 at 1:11 PM

## 2024-03-20 NOTE — PROGRESS NOTES
1434- Patient arrived to Phase I via bed, report from Char MARINO and JUSTO Griffin CRNA. Patient awake and alert, without any complaints. Dressing and boot dry/intact to RLE, elevated on pillow.   1435 - VSS.  1440- VSS. Patient continues to deny any complaints.  1445 - VSS.  1450 - VSS. Patient tolerating ice chips.  1455 - VSS.  1500 - VSS. Patient continues to deny any complaints.  1505- VSS.  1506 - Patient transitioned to Phase II. Patient positioned for comfort. Drink and snack provided, call light in reach.  1528 - Discharge instructions reviewed with patient and friend - Mayra, all questions answered.  1537 - IV removed. Patient dressed with assist of this RN. Patient to restroom.  1539 - Patient discharged via wheelchair to private vehicle with friend.

## 2024-03-20 NOTE — ANESTHESIA POSTPROCEDURE EVALUATION
Department of Anesthesiology  Postprocedure Note    Patient: Ghanshyam Chavez  MRN: [id number]  YOB: 1965  Date of evaluation: 3/20/2024    Procedure Summary       Date: 03/20/24 Room / Location: [address]    Anesthesia Start: 1306 Anesthesia Stop: 1437    Procedure: Distal Metatarsal Osteotomy with Screw Fixation Right Foot, Akin Osteotomy Hallux Right Foot, Extensor Tenotomy of the second digit Right foot, Possible Arthrodesis second digit PIPJ Right Foot (Right) Diagnosis:       Hallux valgus (acquired), right foot      Hammer toe of right foot      Other deformities of toe(s) (acquired), right foot      Contracture of joint of right foot      Type 2 diabetes mellitus with diabetic neuropathy, unspecified whether long term insulin use (HCC)      (Hallux valgus (acquired), right foot [M20.11])      (Hammer toe of right foot [M20.41])      (Other deformities of toe(s) (acquired), right foot [M20.5X1])      (Contracture of joint of right foot [M24.574])      (Type 2 diabetes mellitus with diabetic neuropathy, unspecified whether long term insulin use (HCC) [E11.40])    Surgeons: Davon Dang DPM Responsible Provider: Glenn Nelson DO    Anesthesia Type: general ASA Status: 3            Anesthesia Type: No value filed.    Biju Phase I: Biju Score: 10    Biju Phase II: Biju Score: 10    Anesthesia Post Evaluation    Comments: [facility]  POST-ANESTHESIA NOTE       Name:  Ghanshyam Chavez                                         Age:  59 y.o.  MRN:  [id number]      Last Vitals:  BP (!) 109/56   Pulse 69   Temp 98 °F (36.7 °C) (Temporal)   Resp 13   Ht 1.829 m (6')   Wt (!) 141.3 kg (311 lb 6.4 oz)   SpO2 94%   BMI 42.23 kg/m²   Patient Vitals in the past 4 hrs:  03/20/24 1505, BP:(!) 109/56, Pulse:69, Resp:13, SpO2:94 %  03/20/24 1500, BP:(!) 112/54, Pulse:68, Resp:14, SpO2:93 %  03/20/24 1455, BP:(!) 114/57, Pulse:72,

## 2024-03-20 NOTE — DISCHARGE INSTRUCTIONS
warmth, hardness at operative site.   Blood soaked dressing (small amounts of oozing may be normal.)   Increased or progressive drainage from the surgical area   Inability to urinate or blood in the urine   Pain not relieved by the medications ordered   Persistent nausea and/or vomiting, unable to retain fluids.   Swelling, increased redness, warmth, or hardness around operative area   Numb, tingling or cold toes   Toe(s) become white or bluish    FOLLOW-UP APPOINTMENT   []1 week   []2 weeks   [x]Monday March 25th    Call my office if you have any problem that concerns you [phone]. After hours, you can reach the answering service via the office phone number. IF YOU NEED IMMEDIATE ATTENTION, GO TO THE EMERGENCY ROOM AND YOUR DOCTOR WILL BE CONTACTED.      EMERALD Connor DPM, D.P.M.  Podiatric Surgical Resident  3/20/2024  2:34 PM

## 2024-03-20 NOTE — ANESTHESIA PRE PROCEDURE
Department of Anesthesiology  Preprocedure Note       Name:  Ghanshyam Chavez   Age:  59 y.o.  :  1965                                          MRN:  [id number]         Date:  3/20/2024      Surgeon: Surgeon(s):  Davon Dang DPM    Procedure: Procedure(s):  Distal Metatarsal Osteotomy with Screw Fixation Right Foot, Akin Osteotomy Hallux Right Foot, Extensor Tenotomy of the second digit Right foot, Possible Arthrodesis second digit PIPJ Right Foot    Medications prior to admission:   Prior to Admission medications    Medication Sig Start Date End Date Taking? Authorizing Provider   atorvastatin (LIPITOR) 40 MG tablet Take 1 tablet by mouth daily 3/13/24   Yousif Pop MD   isosorbide mononitrate (IMDUR) 30 MG extended release tablet Take 1 tablet by mouth daily 3/13/24   Yousif Pop MD   lisinopril (PRINIVIL;ZESTRIL) 5 MG tablet Take 1 tablet by mouth daily 3/13/24   Yousif Pop MD   metoprolol succinate (TOPROL XL) 25 MG extended release tablet TAKE 1 TABLET BY MOUTH  TWICE DAILY 3/13/24   Yousif Pop MD   pantoprazole (PROTONIX) 40 MG tablet Take 1 tablet by mouth 2 times daily 3/13/24   Yousif Pop MD   ranolazine (RANEXA) 500 MG extended release tablet Take 1 tablet by mouth 2 times daily 3/13/24   Yousif Pop MD   ticagrelor (BRILINTA) 90 MG TABS tablet Take 1 tablet by mouth 2 times daily 3/13/24   Yousif Pop MD   albuterol sulfate HFA (VENTOLIN HFA) 108 (90 Base) MCG/ACT inhaler Inhale 2 puffs into the lungs 4 times daily as needed for Wheezing 3/5/24   Markus Carvajal DO   Tirzepatide (MOUNJARO) 5 MG/0.5ML SOPN SC injection Inject 0.5 mLs into the skin once a week 24   Markus Carvajal DO   nitroGLYCERIN (NITROSTAT) 0.4 MG SL tablet DISSOLVE 1 TABLET UNDER THE  TONGUE EVERY 5 MINUTES AS NEEDED FOR CHEST PAIN. MAX OF 3 TABLETS IN 15 MINUTES. CALL 911 IF PAIN  PERSISTS. 24   Yousif Pop MD   levothyroxine

## 2024-03-20 NOTE — BRIEF OP NOTE
Brief Postoperative Note      Patient: Ghanshyam Chavez  YOB: 1965  MRN: [id number]    Date of Procedure: 3/20/2024    Pre-Op Diagnosis Codes:     * Hallux valgus (acquired), right foot [M20.11]     * Hammer toe of right foot [M20.41]     * Other deformities of toe(s) (acquired), right foot [M20.5X1]     * Contracture of joint of right foot [M24.574]     * Type 2 diabetes mellitus with diabetic neuropathy, unspecified whether long term insulin use (HCC) [E11.40]    Post-Op Diagnosis: Same       Procedure(s):  Distal Metatarsal Osteotomy with Screw Fixation Right Foot, Akin Osteotomy Hallux Right Foot, Extensor Tenotomy of the second digit Right foot, Possible Arthrodesis second digit PIPJ Right Foot    Surgeon(s):  Davon Dang DPM    Assistant:  Resident: Christopher Hernandez DPM; Jason Roland DPM    Anesthesia: General    Estimated Blood Loss (mL): Minimal    Complications: None    Specimens:   * No specimens in log *    Implants:  Implant Name Type Inv. Item Serial No.  Lot No. LRB No. Used Action   STAPLE STR ASSEMB 10MM X 10MM - [id number]  STAPLE STR ASSEMB 10MM X 10MM  PARAGON 28-WD [id number] Right 1 Implanted   SCREW BONE L30MM DIA4MM AMANDA SH THRD HDLSS MINI-MONSTER - [id number]  SCREW BONE L30MM DIA4MM AMANDA SH THRD HDLSS MINI-[place]  PARAGON 28-WD  Right 1 Implanted   TOE JOINT KIT AMANDA 0 DEG 3.5 MM STRL HAMMERTUBE - [id number]  TOE JOINT KIT AMANDA 0 DEG 3.5 MM STRL HAMMERTUBE  PARAGON 28-WD [id number] Right 1 Implanted         Drains: * No LDAs found *    Findings: Consistent with diagnosis    Hemostasis: Ankle pneumatic tourniquet    Injectables: 18cc 0.25% marcaine plain    Materials: 3-0 Vicryl 4-0 Prolene      Electronically signed by Christopher Hernandez DPM on 3/20/2024 at 2:32 PM

## 2024-03-20 NOTE — OP NOTE
Operative Note      Patient: Ghanshyam Chavez  YOB: 1965  MRN: [id number]    Date of Procedure: 3/20/2024    Pre-Op Diagnosis Codes:     * Hallux valgus (acquired), right foot [M20.11]     * Hammer toe of right foot [M20.41]     * Other deformities of toe(s) (acquired), right foot [M20.5X1]     * Contracture of joint of right foot [M24.574]     * Type 2 diabetes mellitus with diabetic neuropathy, unspecified whether long term insulin use (HCC) [E11.40]    Post-Op Diagnosis: Same       Procedure(s):  Distal Metatarsal Osteotomy with Screw Fixation Right Foot, Akin Osteotomy Hallux Right Foot, Extensor Tenotomy of the second digit Right foot, Possible Arthrodesis second digit PIPJ Right Foot    Surgeon(s):  Davon Dang DPM    Assistant:  Resident: Christopher Hernandez DPM; Jason Roland DPM    Anesthesia: General    Estimated Blood Loss (mL): Minimal    Complications: None    Specimens:   * No specimens in log *    Implants:  Implant Name Type Inv. Item Serial No.  Lot No. LRB No. Used Action   STAPLE STR ASSEMB 10MM X 10MM - [id number]  STAPLE STR ASSEMB 10MM X 10MM  PARAGON 28-WD [id number] Right 1 Implanted   SCREW BONE L30MM DIA4MM AMANDA SH THRD HDLSS MINI-[place] - [id number]  SCREW BONE L30MM DIA4MM AMANDA SH THRD HDLSS [place]  PARAGON 28-WD  Right 1 Implanted   TOE JOINT KIT AMANDA 0 DEG 3.5 MM STRL HAMMERTUBE - [id number]  TOE JOINT KIT AMANDA 0 DEG 3.5 MM STRL HAMMERTUBE  PARAGON 28-WD [id number] Right 1 Implanted         Drains: * No LDAs found *    Findings: Consistent with diagnosis    Hemostasis: Ankle pneumatic tourniquet    Injectables: 18cc 0.25% marcaine plain    Materials: 3-0 Vicryl 4-0 Prolene    Indications: Patient is a 59 y.o. male with a chief complaint of right foot pain who is being seen by Dr. Dang and being treated for HAV right.  At this time all conservative options have been exhausted and surgical intervention is necessary.  The procedure has been

## 2024-03-31 ENCOUNTER — CLINICAL DOCUMENTATION (OUTPATIENT)
Age: 59
End: 2024-03-31

## 2024-03-31 DIAGNOSIS — E03.9 ACQUIRED HYPOTHYROIDISM: Primary | ICD-10-CM

## 2024-04-21 ENCOUNTER — PATIENT MESSAGE (OUTPATIENT)
Dept: FAMILY MEDICINE CLINIC | Age: 59
End: 2024-04-21

## 2024-04-21 DIAGNOSIS — R97.20 ELEVATED PSA: Primary | ICD-10-CM

## 2024-04-22 NOTE — TELEPHONE ENCOUNTER
From: Ghanshyam Chavez  To: Dr. Markus Carvajal  Sent: 4/21/2024 3:56 PM EDT  Subject: Blood work    Do you want to send paperwork in for my blood work for my colon I have to get some done for  will get it this week get it done at [facility]

## 2024-04-24 DIAGNOSIS — E03.9 ACQUIRED HYPOTHYROIDISM: ICD-10-CM

## 2024-04-25 RX ORDER — LEVOTHYROXINE SODIUM 175 UG/1
175 TABLET ORAL DAILY
Qty: 90 TABLET | Refills: 0 | Status: SHIPPED | OUTPATIENT
Start: 2024-04-25

## 2024-04-27 ENCOUNTER — NURSE ONLY (OUTPATIENT)
Dept: LAB | Age: 59
End: 2024-04-27

## 2024-04-27 DIAGNOSIS — R97.20 ELEVATED PSA: ICD-10-CM

## 2024-04-27 DIAGNOSIS — E03.9 ACQUIRED HYPOTHYROIDISM: ICD-10-CM

## 2024-04-27 LAB
T4 FREE SERPL-MCNC: 1.33 NG/DL (ref 0.93–1.68)
TSH SERPL DL<=0.005 MIU/L-ACNC: 1.57 UIU/ML (ref 0.4–4.2)

## 2024-04-30 LAB — PROSTATE SPECIFIC ANTIGEN FREE: NORMAL

## 2024-06-04 ENCOUNTER — NURSE ONLY (OUTPATIENT)
Dept: LAB | Age: 59
End: 2024-06-04

## 2024-06-04 ENCOUNTER — OFFICE VISIT (OUTPATIENT)
Age: 59
End: 2024-06-04
Payer: COMMERCIAL

## 2024-06-04 VITALS
WEIGHT: 315 LBS | SYSTOLIC BLOOD PRESSURE: 116 MMHG | DIASTOLIC BLOOD PRESSURE: 68 MMHG | HEART RATE: 84 BPM | HEIGHT: 72 IN | RESPIRATION RATE: 14 BRPM | BODY MASS INDEX: 42.66 KG/M2

## 2024-06-04 DIAGNOSIS — E03.9 ACQUIRED HYPOTHYROIDISM: Primary | ICD-10-CM

## 2024-06-04 DIAGNOSIS — E03.9 ACQUIRED HYPOTHYROIDISM: ICD-10-CM

## 2024-06-04 LAB
T4 FREE SERPL-MCNC: 1.64 NG/DL (ref 0.93–1.68)
TSH SERPL DL<=0.005 MIU/L-ACNC: 2.47 UIU/ML (ref 0.4–4.2)

## 2024-06-04 PROCEDURE — 99213 OFFICE O/P EST LOW 20 MIN: CPT | Performed by: INTERNAL MEDICINE

## 2024-06-04 RX ORDER — PENICILLIN V POTASSIUM 500 MG/1
TABLET ORAL
COMMUNITY
Start: 2024-04-30

## 2024-06-04 NOTE — PROGRESS NOTES
Alpine 2.5 X 15 mm, post-dilated to 2.77 mm, and Xience Alpine 2.75 X 18 mm, post-dilated to 3.18 mm.  Dr. Gordon   2/22/2010: Stenting of distal LAD using Xience 2.5 X 15 overlapping distally with Xience 2.5 X 12 mm, Dr. Rick    Testosterone deficiency     Thyroid nodule     The patient is euthyroid.       Past Surgical History:   Procedure Laterality Date    APPENDECTOMY      BACK SURGERY      X 2 FOR HERNIATED DISCS    CARDIOVASCULAR STRESS TEST  03/26/2010    Mild chest discomfort induced by IV Lexiscan that resolved spontaneously. No arrhythmias. No EKG changes to suggest ischemia.     CARPAL TUNNEL RELEASE Bilateral 1980    COLONOSCOPY  2013    CORONARY ANGIOPLASTY  2023    TOTAL OF 4 STENTS LAST JANUARY OF 2023    DIAGNOSTIC CARDIAC CATH LAB PROCEDURE  02/22/2010    Successful drug-eluting stent x 2 of mid LAD. LV borderline normal LV function. EF 50-55%. No wall motion abnormalities detected and no MR. Normal hemodynamics. Coronaries - diffuse left anterior descending (LAD) disease with proximal 50% and distal around 70%.     ELBOW SURGERY  1980    right    FOOT SURGERY Right 3/20/2024    Distal Metatarsal Osteotomy with Screw Fixation Right Foot, Akin Osteotomy Hallux Right Foot, Extensor Tenotomy of the second digit Right foot, Possible Arthrodesis second digit PIPJ Right Foot performed by Davon Dang DPM at [facility] OR    KNEE SURGERY  1980    left    LYMPH NODE BIOPSY  2011    neck    SHOULDER ARTHROSCOPY Left     SPINE SURGERY  1993, 2000    THYROIDECTOMY N/A 10/27/2023    TOTAL THYROIDECTOMY performed by Isaac Johnston MD at [facility] OR    TOTAL KNEE ARTHROPLASTY Left 2021    UPPER GASTROINTESTINAL ENDOSCOPY  2013    US THYROID CYST ASPIRATION AND OR INJECTION  02/23/2021    US THYROID CYST ASPIRATION AND OR INJECTION 2/23/2021 [facility]TR[facility]    US THYROID CYST ASPIRATION AND OR INJECTION  10/05/2023    US THYROID CYST ASPIRATION AND OR INJECTION 10/5/2023 [facility] ULTRASOUND

## 2024-06-30 DIAGNOSIS — E03.9 ACQUIRED HYPOTHYROIDISM: ICD-10-CM

## 2024-07-01 RX ORDER — LEVOTHYROXINE SODIUM 175 UG/1
175 TABLET ORAL DAILY
Qty: 90 TABLET | Refills: 1 | Status: SHIPPED | OUTPATIENT
Start: 2024-07-01

## 2024-08-13 NOTE — PROGRESS NOTES
[facility]      Ghanshyam Chavez         [id number]  8/14/2024   Chief Complaint   Patient presents with    Follow-up     KIRAN 1 year f/u with [facility] download.         Patient of Sugar Crawley CNP     Assessment/Plan   1. Obstructive sleep apnea treated with BiPAP    2. Obesity, Class III, BMI 40-49.9 (morbid obesity) ([facility])    3. Persistent disorder of initiating or maintaining sleep       -Discussed future options for sleep maintenance but patient declines treatment at this time. He has tried melatonin without improvement. Instructed that he could try using magnesium at night if he wants to do so.   -He is going to f/u with Dr. Carvajal and will notify office if he needs additional help  -Yearly supply order placed? Yes   -Download was personally reviewed and discussed with patient at length.  -The symptoms of KIRAN have improved. The patient is benefiting from therapy and should continue use of PAP device.  -Patient's symptoms and AHI are controlled with current settings of 15/11 cmH2O. Continue current pressure settings.  -Advised to continue current positive airway pressure therapy with above described pressure.   -Advised to keep good compliance with current recommended pressure to get optimal results and clinical improvement  -Recommend 7-9 hours of sleep with PAP  -Instructed to call DME company regarding supplies if needed.   -Patient to call my office for earlier appointment if needed for worsening of sleep symptoms.   -Patient is not to drive if feeling sleepy  -Counseled patient on weight loss  - DME Order for CPAP as OP    Educated about my impression and plan. Patient verbalizes understanding.      Return in about 1 year (around 8/14/2025) for KIRAN. with download.      Subjective   Presentation:   Ghanshyam presents for sleep medicine follow up for obstructive sleep apnea.  Since the last visit, Ghanshyam has been doing very well on PAP therapy and reports good benefit from

## 2024-08-14 ENCOUNTER — OFFICE VISIT (OUTPATIENT)
Dept: PULMONOLOGY | Age: 59
End: 2024-08-14
Payer: COMMERCIAL

## 2024-08-14 VITALS
BODY MASS INDEX: 42.45 KG/M2 | TEMPERATURE: 98 F | DIASTOLIC BLOOD PRESSURE: 68 MMHG | WEIGHT: 313.4 LBS | HEART RATE: 77 BPM | SYSTOLIC BLOOD PRESSURE: 120 MMHG | HEIGHT: 72 IN | OXYGEN SATURATION: 96 %

## 2024-08-14 DIAGNOSIS — G47.00 PERSISTENT DISORDER OF INITIATING OR MAINTAINING SLEEP: ICD-10-CM

## 2024-08-14 DIAGNOSIS — G47.33 OBSTRUCTIVE SLEEP APNEA TREATED WITH BIPAP: Primary | ICD-10-CM

## 2024-08-14 DIAGNOSIS — E66.01 OBESITY, CLASS III, BMI 40-49.9 (MORBID OBESITY) (HCC): ICD-10-CM

## 2024-08-14 PROCEDURE — 99214 OFFICE O/P EST MOD 30 MIN: CPT

## 2024-08-14 RX ORDER — TIRZEPATIDE 12.5 MG/.5ML
INJECTION, SOLUTION SUBCUTANEOUS
COMMUNITY
Start: 2024-07-24

## 2024-08-14 ASSESSMENT — ENCOUNTER SYMPTOMS
COUGH: 0
RHINORRHEA: 0
WHEEZING: 0
SHORTNESS OF BREATH: 0
SORE THROAT: 0

## 2024-08-22 ENCOUNTER — LAB (OUTPATIENT)
Dept: LAB | Age: 59
End: 2024-08-22

## 2024-08-22 DIAGNOSIS — E03.9 ACQUIRED HYPOTHYROIDISM: ICD-10-CM

## 2024-08-22 LAB
T4 FREE SERPL-MCNC: 1.67 NG/DL (ref 0.93–1.68)
TSH SERPL DL<=0.005 MIU/L-ACNC: 0.53 UIU/ML (ref 0.4–4.2)

## 2024-09-04 ENCOUNTER — OFFICE VISIT (OUTPATIENT)
Age: 59
End: 2024-09-04
Payer: COMMERCIAL

## 2024-09-04 VITALS
BODY MASS INDEX: 42.42 KG/M2 | SYSTOLIC BLOOD PRESSURE: 108 MMHG | HEART RATE: 78 BPM | HEIGHT: 72 IN | WEIGHT: 313.2 LBS | DIASTOLIC BLOOD PRESSURE: 58 MMHG

## 2024-09-04 DIAGNOSIS — E03.9 ACQUIRED HYPOTHYROIDISM: Primary | ICD-10-CM

## 2024-09-04 PROCEDURE — 99213 OFFICE O/P EST LOW 20 MIN: CPT | Performed by: INTERNAL MEDICINE

## 2024-09-04 RX ORDER — LEVOTHYROXINE SODIUM 175 UG/1
175 TABLET ORAL DAILY
Qty: 90 TABLET | Refills: 1 | Status: SHIPPED | OUTPATIENT
Start: 2024-09-04

## 2024-09-04 NOTE — PROGRESS NOTES
"Chief Complaint   Patient presents with     Anxiety     Depression     Heart Problem       Initial /58 (BP Location: Left arm, Patient Position: Sitting, Cuff Size: Adult Regular)   Pulse 84   Temp 98.2  F (36.8  C) (Tympanic)   Resp 18   Ht 1.549 m (5' 0.98\")   Wt 45.4 kg (100 lb)   SpO2 96%   BMI 18.91 kg/m   Estimated body mass index is 18.91 kg/m  as calculated from the following:    Height as of this encounter: 1.549 m (5' 0.98\").    Weight as of this encounter: 45.4 kg (100 lb).  Medication Reconciliation: complete  Gauri Milligan LPN  " 10/5/2023 [facility] ULTRASOUND       Family History   Problem Relation Age of Onset    Heart Disease Mother         Bypass/Surgery.    Cancer Mother         Breast cancer.    Coronary Art Dis Mother         History of CAD with myocardial infarction and open heart surgery in her 60's.     COPD Mother     Heart Disease Father         Bypass/Surgery.     Cancer Father         Leukemia.     Coronary Art Dis Father         History of CAD with myocardial infarction and open heart surgery around his 50's.     Prostate Cancer Neg Hx      Social History     Tobacco Use    Smoking status: Former     Current packs/day: 0.00     Average packs/day: 1 pack/day for 14.0 years (14.0 ttl pk-yrs)     Types: Cigarettes     Start date: 8/3/1988     Quit date: 8/3/2002     Years since quittin.1    Smokeless tobacco: Current     Types: Chew    Tobacco comments:     Patient states, \"he chews tobacco.\"    Substance Use Topics    Alcohol use: Not Currently      Current Outpatient Medications   Medication Sig Dispense Refill    levothyroxine (SYNTHROID) 175 MCG tablet Take 1 tablet by mouth Daily 1 tablet daily for 6 days and none on day 7 90 tablet 1    MOUNJARO 12.5 MG/0.5ML SOPN SC injection       atorvastatin (LIPITOR) 40 MG tablet Take 1 tablet by mouth daily 90 tablet 3    isosorbide mononitrate (IMDUR) 30 MG extended release tablet Take 1 tablet by mouth daily 90 tablet 3    lisinopril (PRINIVIL;ZESTRIL) 5 MG tablet Take 1 tablet by mouth daily 90 tablet 3    metoprolol succinate (TOPROL XL) 25 MG extended release tablet TAKE 1 TABLET BY MOUTH  TWICE DAILY 180 tablet 3    pantoprazole (PROTONIX) 40 MG tablet Take 1 tablet by mouth 2 times daily 180 tablet 3    ranolazine (RANEXA) 500 MG extended release tablet Take 1 tablet by mouth 2 times daily 180 tablet 3    ticagrelor (BRILINTA) 90 MG TABS tablet Take 1 tablet by mouth 2 times daily 180 tablet 3    albuterol sulfate HFA (VENTOLIN HFA) 108 (90 Base) MCG/ACT inhaler Inhale 2 puffs

## 2024-09-13 ENCOUNTER — PATIENT MESSAGE (OUTPATIENT)
Dept: FAMILY MEDICINE CLINIC | Age: 59
End: 2024-09-13

## 2024-09-13 DIAGNOSIS — M79.672 ACUTE FOOT PAIN, LEFT: Primary | ICD-10-CM

## 2024-09-17 ENCOUNTER — HOSPITAL ENCOUNTER (OUTPATIENT)
Age: 59
Discharge: HOME | End: 2024-09-17
Payer: COMMERCIAL

## 2024-09-17 ENCOUNTER — HOSPITAL ENCOUNTER (OUTPATIENT)
Dept: GENERAL RADIOLOGY | Age: 59
Discharge: HOME | End: 2024-09-17
Payer: COMMERCIAL

## 2024-09-17 DIAGNOSIS — M79.672 ACUTE FOOT PAIN, LEFT: ICD-10-CM

## 2024-09-17 PROCEDURE — 73630 X-RAY EXAM OF FOOT: CPT

## 2024-12-18 RX ORDER — NITROGLYCERIN 0.4 MG/1
TABLET SUBLINGUAL
Qty: 100 TABLET | Refills: 3 | Status: SHIPPED | OUTPATIENT
Start: 2024-12-18

## 2025-01-13 ENCOUNTER — HOSPITAL ENCOUNTER (OUTPATIENT)
Dept: GENERAL RADIOLOGY | Age: 60
Discharge: HOME | End: 2025-01-13

## 2025-01-13 ENCOUNTER — HOSPITAL ENCOUNTER (OUTPATIENT)
Age: 60
Discharge: HOME | End: 2025-01-13

## 2025-01-13 DIAGNOSIS — R52 PAIN: ICD-10-CM

## 2025-01-16 RX ORDER — ATORVASTATIN CALCIUM 40 MG/1
40 TABLET, FILM COATED ORAL DAILY
Qty: 90 TABLET | Refills: 0 | Status: SHIPPED | OUTPATIENT
Start: 2025-01-16

## 2025-01-16 RX ORDER — METOPROLOL SUCCINATE 25 MG/1
TABLET, EXTENDED RELEASE ORAL
Qty: 180 TABLET | Refills: 0 | Status: SHIPPED | OUTPATIENT
Start: 2025-01-16

## 2025-01-16 RX ORDER — TICAGRELOR 90 MG/1
90 TABLET ORAL 2 TIMES DAILY
Qty: 180 TABLET | Refills: 0 | Status: SHIPPED | OUTPATIENT
Start: 2025-01-16

## 2025-01-16 RX ORDER — LISINOPRIL 5 MG/1
5 TABLET ORAL DAILY
Qty: 90 TABLET | Refills: 0 | Status: SHIPPED | OUTPATIENT
Start: 2025-01-16

## 2025-02-06 DIAGNOSIS — E03.9 ACQUIRED HYPOTHYROIDISM: ICD-10-CM

## 2025-02-08 RX ORDER — LEVOTHYROXINE SODIUM 175 UG/1
TABLET ORAL
Qty: 78 TABLET | Refills: 1 | Status: SHIPPED | OUTPATIENT
Start: 2025-02-08

## 2025-02-10 ENCOUNTER — OFFICE VISIT (OUTPATIENT)
Dept: FAMILY MEDICINE CLINIC | Age: 60
End: 2025-02-10
Payer: COMMERCIAL

## 2025-02-10 VITALS
SYSTOLIC BLOOD PRESSURE: 104 MMHG | HEART RATE: 68 BPM | BODY MASS INDEX: 40.9 KG/M2 | DIASTOLIC BLOOD PRESSURE: 52 MMHG | RESPIRATION RATE: 16 BRPM | WEIGHT: 301.6 LBS

## 2025-02-10 DIAGNOSIS — Z95.5 S/P CORONARY ARTERY STENT PLACEMENT: ICD-10-CM

## 2025-02-10 DIAGNOSIS — E11.9 CONTROLLED TYPE 2 DIABETES MELLITUS WITHOUT COMPLICATION, WITHOUT LONG-TERM CURRENT USE OF INSULIN (HCC): ICD-10-CM

## 2025-02-10 DIAGNOSIS — G47.33 OSA TREATED WITH BIPAP: ICD-10-CM

## 2025-02-10 DIAGNOSIS — Z00.00 WELL ADULT HEALTH CHECK: Primary | ICD-10-CM

## 2025-02-10 DIAGNOSIS — I25.118 ATHEROSCLEROTIC HEART DISEASE OF NATIVE CORONARY ARTERY WITH OTHER FORMS OF ANGINA PECTORIS (HCC): ICD-10-CM

## 2025-02-10 DIAGNOSIS — E66.01 MORBID OBESITY: ICD-10-CM

## 2025-02-10 PROCEDURE — 99396 PREV VISIT EST AGE 40-64: CPT | Performed by: FAMILY MEDICINE

## 2025-02-10 SDOH — ECONOMIC STABILITY: FOOD INSECURITY: WITHIN THE PAST 12 MONTHS, YOU WORRIED THAT YOUR FOOD WOULD RUN OUT BEFORE YOU GOT MONEY TO BUY MORE.: NEVER TRUE

## 2025-02-10 SDOH — ECONOMIC STABILITY: FOOD INSECURITY: WITHIN THE PAST 12 MONTHS, THE FOOD YOU BOUGHT JUST DIDN'T LAST AND YOU DIDN'T HAVE MONEY TO GET MORE.: NEVER TRUE

## 2025-02-10 ASSESSMENT — PATIENT HEALTH QUESTIONNAIRE - PHQ9
SUM OF ALL RESPONSES TO PHQ QUESTIONS 1-9: 0
1. LITTLE INTEREST OR PLEASURE IN DOING THINGS: NOT AT ALL
SUM OF ALL RESPONSES TO PHQ9 QUESTIONS 1 & 2: 0
SUM OF ALL RESPONSES TO PHQ QUESTIONS 1-9: 0
2. FEELING DOWN, DEPRESSED OR HOPELESS: NOT AT ALL

## 2025-02-10 ASSESSMENT — ENCOUNTER SYMPTOMS
GASTROINTESTINAL NEGATIVE: 1
RESPIRATORY NEGATIVE: 1

## 2025-02-10 NOTE — PROGRESS NOTES
Ghanshyam Chavez (:  1965) is a 60 y.o. male,Established patient, here for evaluation of the following chief complaint(s):  Annual Exam          Subjective   SUBJECTIVE/OBJECTIVE:  HPI  Chief Complaint   Patient presents with    Annual Exam     Annual eval.    BP Readings from Last 3 Encounters:   02/10/25 (!) 104/52   24 (!) 108/58   24 120/68     Wt Readings from Last 3 Encounters:   02/10/25 (!) 136.8 kg (301 lb 9.6 oz)   24 (!) 142.1 kg (313 lb 3.2 oz)   24 (!) 142.2 kg (313 lb 6.4 oz)       Patient Active Problem List   Diagnosis    Asthma    Gastroesophageal reflux disease    Hyperlipidemia    History of smoking    Chronic back pain    S/P angioplasty    Groin hematoma    Testosterone deficiency    Joint pain    Arthritis    Atypical chest pain    Family history of premature CAD    Hypogonadism, male    Thyroid nodule    History of erectile dysfunction    Male sexual dysfunction    Heart disease    Chest pain with moderate risk of acute coronary syndrome    Atherosclerotic heart disease of native coronary artery with other forms of angina pectoris (HCC)    S/P PTCA: 2015: FFR-guided stenting of distal and mid-LAD using Xience Alpine 2.5X15, post-2.77, and Alpine 2.75X18 mm, post-3.18 mm.     Obesity (BMI 30-39.9)    Obstructive sleep apnea treated with BiPAP    COVID-19    Angina at rest (HCC)    Unstable angina (HCC)    Transient synovitis of right ankle    Hallux limitus, right    Hammer toe of right foot    Multinodular goiter    Controlled type 2 diabetes mellitus without complication, without long-term current use of insulin (HCC)       Current Outpatient Medications   Medication Sig Dispense Refill    levothyroxine (SYNTHROID) 175 MCG tablet TAKE 1 TABLET BY MOUTH DAILY FOR 6 DAYS AND NONE ON DAY 7 78 tablet 1    atorvastatin (LIPITOR) 40 MG tablet TAKE 1 TABLET BY MOUTH DAILY 90 tablet 0    BRILINTA 90 MG TABS tablet TAKE 1 TABLET BY MOUTH TWICE  DAILY 180 tablet 0

## 2025-02-10 NOTE — PATIENT INSTRUCTIONS
You may receive a survey about your visit with us today. The feedback from our patients helps us identify what is working well and where the service to all patients can be enhanced. Thank you!     Tobacco Cessation Programs     Telephonic behavior modification  1-800-QUIT-NOW (995-3022)  Counseling service for those who are ready to quit using tobacco.    Available for uninsured [place] residents, Medicaid recipients, pregnant women, or patients whose health plans or employers are members of the [place] [employer]    Online behavior modification  http://[place].[employer]uitlogix.org  Online support program to help patients through each step of the quitting process.  Available 24 hours a day 7 days a week.  Provides up to date researched based tool, step-by-step guides, and motivational messages.    Online behavior modification  www.lungusa.org/stop-smoking/how-to-quit  HelpLine: 1-800-LUNG-[place] (975-5775)  Email questions to:  questions@[facility].org   Website offers resources to help tobacco users figure out their reasons for quitting and then take the big step of quitting for good.    Hypnosis  Location: [address] [place] [address]  Contact: Rowena Dove, PhD at [phone]  Hypnosis for tobacco cessation  Cost $225 for the initial session and $175 for each session afterwards.  Most patients require 6-8 sessions.  There is the option to submit through the patient’s insurance.    Hypnosis and behavior modification  Location: [address] [place] [address]  Contact: Marni Vanessa, PhD at [phone]  Counseling and hypnosis for nicotine addition  Cost: For uninsured patients:  Please call above phone number  Cost for insured patients depends on patient’s insurance plan.    Behavior modification  Location: [place] [facility], [address] [place] [address]  Contact: [phone]  Services include four one-on-one appointments between the patient and a respiratory therapist.  The four appointments

## 2025-02-11 ENCOUNTER — LAB (OUTPATIENT)
Dept: LAB | Age: 60
End: 2025-02-11

## 2025-02-11 DIAGNOSIS — Z00.00 WELL ADULT HEALTH CHECK: ICD-10-CM

## 2025-02-11 DIAGNOSIS — E03.9 ACQUIRED HYPOTHYROIDISM: ICD-10-CM

## 2025-02-11 LAB
ALBUMIN SERPL BCG-MCNC: 3.7 G/DL (ref 3.5–5.1)
ALP SERPL-CCNC: 83 U/L (ref 38–126)
ALT SERPL W/O P-5'-P-CCNC: 17 U/L (ref 11–66)
ANION GAP SERPL CALC-SCNC: 16 MEQ/L (ref 8–16)
AST SERPL-CCNC: 15 U/L (ref 5–40)
BASOPHILS ABSOLUTE: 0 THOU/MM3 (ref 0–0.1)
BASOPHILS NFR BLD AUTO: 0.7 %
BILIRUB SERPL-MCNC: 0.3 MG/DL (ref 0.3–1.2)
BUN SERPL-MCNC: 16 MG/DL (ref 7–22)
CALCIUM SERPL-MCNC: 8.9 MG/DL (ref 8.5–10.5)
CHLORIDE SERPL-SCNC: 109 MEQ/L (ref 98–111)
CHOLEST SERPL-MCNC: 94 MG/DL (ref 100–199)
CO2 SERPL-SCNC: 19 MEQ/L (ref 23–33)
CREAT SERPL-MCNC: 0.8 MG/DL (ref 0.4–1.2)
CREAT UR-MCNC: 142.9 MG/DL
DEPRECATED MEAN GLUCOSE BLD GHB EST-ACNC: 105 MG/DL (ref 70–126)
DEPRECATED RDW RBC AUTO: 50.4 FL (ref 35–45)
EOSINOPHIL NFR BLD AUTO: 2.9 %
EOSINOPHILS ABSOLUTE: 0.2 THOU/MM3 (ref 0–0.4)
ERYTHROCYTE [DISTWIDTH] IN BLOOD BY AUTOMATED COUNT: 15.5 % (ref 11.5–14.5)
GFR SERPL CREATININE-BSD FRML MDRD: > 90 ML/MIN/1.73M2
GLUCOSE SERPL-MCNC: 108 MG/DL (ref 70–108)
HBA1C MFR BLD HPLC: 5.5 % (ref 4.4–6.4)
HCT VFR BLD AUTO: 38.4 % (ref 42–52)
HDLC SERPL-MCNC: 40 MG/DL
HGB BLD-MCNC: 12 GM/DL (ref 14–18)
IMM GRANULOCYTES # BLD AUTO: 0.04 THOU/MM3 (ref 0–0.07)
IMM GRANULOCYTES NFR BLD AUTO: 0.7 %
LDLC SERPL CALC-MCNC: 32 MG/DL
LYMPHOCYTES ABSOLUTE: 1 THOU/MM3 (ref 1–4.8)
LYMPHOCYTES NFR BLD AUTO: 18 %
MCH RBC QN AUTO: 28 PG (ref 26–33)
MCHC RBC AUTO-ENTMCNC: 31.3 GM/DL (ref 32.2–35.5)
MCV RBC AUTO: 89.7 FL (ref 80–94)
MICROALBUMIN UR-MCNC: < 1.2 MG/DL
MICROALBUMIN/CREAT RATIO PNL UR: 8 MG/G (ref 0–30)
MONOCYTES ABSOLUTE: 0.6 THOU/MM3 (ref 0.4–1.3)
MONOCYTES NFR BLD AUTO: 10.9 %
NEUTROPHILS ABSOLUTE: 3.9 THOU/MM3 (ref 1.8–7.7)
NEUTROPHILS NFR BLD AUTO: 66.8 %
NRBC BLD AUTO-RTO: 0 /100 WBC
PLATELET # BLD AUTO: 291 THOU/MM3 (ref 130–400)
PMV BLD AUTO: 9.3 FL (ref 9.4–12.4)
POTASSIUM SERPL-SCNC: 4.3 MEQ/L (ref 3.5–5.2)
PROT SERPL-MCNC: 6.3 G/DL (ref 6.1–8)
PSA SERPL-MCNC: 4.23 NG/ML (ref 0–1)
RBC # BLD AUTO: 4.28 MILL/MM3 (ref 4.7–6.1)
SODIUM SERPL-SCNC: 144 MEQ/L (ref 135–145)
TRIGL SERPL-MCNC: 108 MG/DL (ref 0–199)
TSH SERPL DL<=0.005 MIU/L-ACNC: 0.85 UIU/ML (ref 0.4–4.2)
WBC # BLD AUTO: 5.8 THOU/MM3 (ref 4.8–10.8)

## 2025-02-18 RX ORDER — TIRZEPATIDE 12.5 MG/.5ML
INJECTION, SOLUTION SUBCUTANEOUS
Qty: 6 ML | Refills: 3 | Status: SHIPPED | OUTPATIENT
Start: 2025-02-18

## 2025-03-04 ENCOUNTER — OFFICE VISIT (OUTPATIENT)
Age: 60
End: 2025-03-04
Payer: COMMERCIAL

## 2025-03-04 VITALS
HEIGHT: 72 IN | RESPIRATION RATE: 16 BRPM | HEART RATE: 81 BPM | SYSTOLIC BLOOD PRESSURE: 106 MMHG | WEIGHT: 303.2 LBS | DIASTOLIC BLOOD PRESSURE: 62 MMHG | BODY MASS INDEX: 41.07 KG/M2

## 2025-03-04 DIAGNOSIS — E03.9 ACQUIRED HYPOTHYROIDISM: ICD-10-CM

## 2025-03-04 PROCEDURE — 99213 OFFICE O/P EST LOW 20 MIN: CPT | Performed by: INTERNAL MEDICINE

## 2025-03-04 RX ORDER — LEVOTHYROXINE SODIUM 175 UG/1
175 TABLET ORAL DAILY
Qty: 90 TABLET | Refills: 2 | Status: SHIPPED | OUTPATIENT
Start: 2025-03-04

## 2025-03-04 NOTE — PROGRESS NOTES
[facility] [place] [facility]  [facility] ENDOCRINOLOGY  [address]  [facility] [address]  Dept: [phone]  Loc: [phone]     Visit Date: 3/4/2025    Ghanshyam Chavez is a 60 y.o. male who presents today for:  Chief Complaint   Patient presents with    Follow-up     Acquired hypothyroiism            Subjective:      HPI     Ghanshyam Chavez is a 60 y.o. , male who comes for Follow up for hypothyroidism.  Markus Carvajal,   Ghanshyam Chavez was diagnosed with hypothyroidism last year.  Etiology of hypothyroidism is Surgery for multinodular goiter.  The pathology report was benign.  Current therapy includes levothyroxine 175 mcg daily 6 days a week .    The patient had labs last month and his TSH was normal.  Today he reports no signs or symptoms of hypothyroidism.  Past Medical History:   Diagnosis Date    Anxiety attacks     Anxiety due to family issues.    Arthritis     Atypical chest pain     Cuevas esophagus     Chronic back pain     Coronary artery disease     S/P stent of the LAD by Dr. Rick on 2 23 2010.    Diabetes mellitus, type 2 (HCC)     Erectile dysfunction     Family history of premature CAD     Gastroesophageal reflux disease     Groin hematoma     Herniated disc 2011    back and neck    History of erectile dysfunction 2008    History of giardia infection     History of Giardia.     History of iron deficiency     History of pulmonary embolus (PE) 1980's.    Remote history of pulmonary embolus and negative computed tomography for pulmonary embolus.     History of smoking     Hyperlipidemia     Well managed.    Hypogonadism, male     The patient has central hypogonadism.     Joint pain     Morbid obesity     KIRAN (obstructive sleep apnea)     has CPAP    S/P angioplasty     S/P PTCA: 1/12/2015: FFR-guided stenting of distal and mid-LAD using Xience Alpine 2.5X15, post-2.77, and Alpine 2.75X18 mm, post-3.18 mm.  01/12/2015 1/12/2015: FFR-guided stenting

## 2025-03-04 NOTE — PROGRESS NOTES
Hypothyroidism Checklist   Name: Ghanshyam SAUCEDO Scott    YOB: 1965    Hypothyroidism Yes No   Cold Tendency  []  [x]    Constipation  []  [x]    Dry Skin  []  [x]    Brittle Hair  []  [x]    Depression  []  [x]    Fatigue/Low Energy  []  [x]    Memory Problems  []  [x]    Weight Gain  []  [x]

## 2025-03-10 ENCOUNTER — OFFICE VISIT (OUTPATIENT)
Dept: FAMILY MEDICINE CLINIC | Age: 60
End: 2025-03-10
Payer: COMMERCIAL

## 2025-03-10 VITALS
BODY MASS INDEX: 41.13 KG/M2 | SYSTOLIC BLOOD PRESSURE: 112 MMHG | HEART RATE: 76 BPM | DIASTOLIC BLOOD PRESSURE: 58 MMHG | WEIGHT: 303.3 LBS | RESPIRATION RATE: 20 BRPM

## 2025-03-10 DIAGNOSIS — E11.9 CONTROLLED TYPE 2 DIABETES MELLITUS WITHOUT COMPLICATION, WITHOUT LONG-TERM CURRENT USE OF INSULIN (HCC): ICD-10-CM

## 2025-03-10 DIAGNOSIS — I87.2 VENOUS INSUFFICIENCY: ICD-10-CM

## 2025-03-10 DIAGNOSIS — I25.118 ATHEROSCLEROTIC HEART DISEASE OF NATIVE CORONARY ARTERY WITH OTHER FORMS OF ANGINA PECTORIS: ICD-10-CM

## 2025-03-10 DIAGNOSIS — G47.33 OSA TREATED WITH BIPAP: ICD-10-CM

## 2025-03-10 DIAGNOSIS — E66.01 MORBID OBESITY: ICD-10-CM

## 2025-03-10 DIAGNOSIS — R60.0 LOWER LEG EDEMA: Primary | ICD-10-CM

## 2025-03-10 PROCEDURE — 3044F HG A1C LEVEL LT 7.0%: CPT | Performed by: FAMILY MEDICINE

## 2025-03-10 PROCEDURE — 99214 OFFICE O/P EST MOD 30 MIN: CPT | Performed by: FAMILY MEDICINE

## 2025-03-10 PROCEDURE — G2211 COMPLEX E/M VISIT ADD ON: HCPCS | Performed by: FAMILY MEDICINE

## 2025-03-10 ASSESSMENT — ENCOUNTER SYMPTOMS
RESPIRATORY NEGATIVE: 1
GASTROINTESTINAL NEGATIVE: 1

## 2025-03-10 NOTE — PROGRESS NOTES
Ghanshyam Chavez (:  1965) is a 60 y.o. male,Established patient, here for evaluation of the following chief complaint(s):  Leg Swelling (bilateral)          Subjective   SUBJECTIVE/OBJECTIVE:  HPI  Chief Complaint   Patient presents with    Leg Swelling     bilateral     Pt presents today for bilateral LE edema.  This is chronic for Jae but Dr. Jackman expressed concerns.    Weight is stable.  Wt Readings from Last 3 Encounters:   03/10/25 (!) 137.6 kg (303 lb 4.8 oz)   25 (!) 137.5 kg (303 lb 3.2 oz)   02/10/25 (!) 136.8 kg (301 lb 9.6 oz)     BP Readings from Last 3 Encounters:   03/10/25 (!) 112/58   25 106/62   02/10/25 (!) 104/52       Patient Active Problem List   Diagnosis    Asthma    Gastroesophageal reflux disease    Hyperlipidemia    History of smoking    Chronic back pain    S/P angioplasty    Groin hematoma    Testosterone deficiency    Joint pain    Arthritis    Atypical chest pain    Family history of premature CAD    Hypogonadism, male    Thyroid nodule    History of erectile dysfunction    Male sexual dysfunction    Heart disease    Chest pain with moderate risk of acute coronary syndrome    Atherosclerotic heart disease of native coronary artery with other forms of angina pectoris    S/P PTCA: 2015: FFR-guided stenting of distal and mid-LAD using Xience Alpine 2.5X15, post-2.77, and Alpine 2.75X18 mm, post-3.18 mm.     Obesity (BMI 30-39.9)    Obstructive sleep apnea treated with BiPAP    COVID-19    Angina at rest    Unstable angina (HCC)    Transient synovitis of right ankle    Hallux limitus, right    Hammer toe of right foot    Multinodular goiter    Controlled type 2 diabetes mellitus without complication, without long-term current use of insulin (HCC)       Current Outpatient Medications   Medication Sig Dispense Refill    levothyroxine (SYNTHROID) 175 MCG tablet Take 1 tablet by mouth Daily 90 tablet 2    MOUNJARO 12.5 MG/0.5ML SOAJ INJECT THE CONTENTS OF ONE PEN

## 2025-03-10 NOTE — PATIENT INSTRUCTIONS
You may receive a survey about your visit with us today. The feedback from our patients helps us identify what is working well and where the service to all patients can be enhanced. Thank you!     Compression hose 20-30 mm Hg

## 2025-03-13 RX ORDER — ISOSORBIDE MONONITRATE 30 MG/1
30 TABLET, EXTENDED RELEASE ORAL DAILY
Qty: 30 TABLET | Refills: 0 | Status: SHIPPED | OUTPATIENT
Start: 2025-03-13

## 2025-03-13 RX ORDER — PANTOPRAZOLE SODIUM 40 MG/1
40 TABLET, DELAYED RELEASE ORAL 2 TIMES DAILY
Qty: 180 TABLET | Refills: 0 | Status: SHIPPED | OUTPATIENT
Start: 2025-03-13

## 2025-03-13 RX ORDER — RANOLAZINE 500 MG/1
500 TABLET, EXTENDED RELEASE ORAL 2 TIMES DAILY
Qty: 60 TABLET | Refills: 0 | Status: SHIPPED | OUTPATIENT
Start: 2025-03-13

## 2025-03-17 ENCOUNTER — OFFICE VISIT (OUTPATIENT)
Dept: CARDIOLOGY CLINIC | Age: 60
End: 2025-03-17
Payer: COMMERCIAL

## 2025-03-17 VITALS
BODY MASS INDEX: 38.76 KG/M2 | SYSTOLIC BLOOD PRESSURE: 102 MMHG | DIASTOLIC BLOOD PRESSURE: 64 MMHG | WEIGHT: 302 LBS | HEIGHT: 74 IN | HEART RATE: 90 BPM

## 2025-03-17 DIAGNOSIS — I10 PRIMARY HYPERTENSION: ICD-10-CM

## 2025-03-17 DIAGNOSIS — I25.10 ATHEROSCLEROSIS OF NATIVE CORONARY ARTERY OF NATIVE HEART WITHOUT ANGINA PECTORIS: ICD-10-CM

## 2025-03-17 DIAGNOSIS — R06.02 SHORTNESS OF BREATH: ICD-10-CM

## 2025-03-17 DIAGNOSIS — E78.5 DYSLIPIDEMIA: ICD-10-CM

## 2025-03-17 DIAGNOSIS — I25.10 CORONARY ARTERY DISEASE INVOLVING NATIVE CORONARY ARTERY OF NATIVE HEART WITHOUT ANGINA PECTORIS: Primary | ICD-10-CM

## 2025-03-17 DIAGNOSIS — I10 ESSENTIAL HYPERTENSION: ICD-10-CM

## 2025-03-17 PROCEDURE — 99214 OFFICE O/P EST MOD 30 MIN: CPT | Performed by: NUCLEAR MEDICINE

## 2025-03-17 PROCEDURE — 93000 ELECTROCARDIOGRAM COMPLETE: CPT | Performed by: NUCLEAR MEDICINE

## 2025-03-17 PROCEDURE — 3078F DIAST BP <80 MM HG: CPT | Performed by: NUCLEAR MEDICINE

## 2025-03-17 PROCEDURE — 3074F SYST BP LT 130 MM HG: CPT | Performed by: NUCLEAR MEDICINE

## 2025-03-17 RX ORDER — METOPROLOL SUCCINATE 25 MG/1
TABLET, EXTENDED RELEASE ORAL
Qty: 180 TABLET | Refills: 3 | Status: SHIPPED | OUTPATIENT
Start: 2025-03-17

## 2025-03-17 RX ORDER — ATORVASTATIN CALCIUM 40 MG/1
40 TABLET, FILM COATED ORAL DAILY
Qty: 90 TABLET | Refills: 3 | Status: SHIPPED | OUTPATIENT
Start: 2025-03-17

## 2025-03-17 RX ORDER — RANOLAZINE 500 MG/1
500 TABLET, EXTENDED RELEASE ORAL 2 TIMES DAILY
Qty: 180 TABLET | Refills: 3 | Status: SHIPPED | OUTPATIENT
Start: 2025-03-17

## 2025-03-17 RX ORDER — NITROGLYCERIN 0.4 MG/1
0.4 TABLET SUBLINGUAL EVERY 5 MIN PRN
Qty: 100 TABLET | Refills: 3 | Status: SHIPPED | OUTPATIENT
Start: 2025-03-17

## 2025-03-17 RX ORDER — PANTOPRAZOLE SODIUM 40 MG/1
40 TABLET, DELAYED RELEASE ORAL 2 TIMES DAILY
Qty: 180 TABLET | Refills: 3 | Status: SHIPPED | OUTPATIENT
Start: 2025-03-17

## 2025-03-17 RX ORDER — ISOSORBIDE MONONITRATE 30 MG/1
30 TABLET, EXTENDED RELEASE ORAL DAILY
Qty: 90 TABLET | Refills: 3 | Status: CANCELLED | OUTPATIENT
Start: 2025-03-17

## 2025-03-17 RX ORDER — LISINOPRIL 5 MG/1
5 TABLET ORAL DAILY
Qty: 90 TABLET | Refills: 3 | Status: SHIPPED | OUTPATIENT
Start: 2025-03-17

## 2025-07-03 NOTE — TELEPHONE ENCOUNTER
Patient calling with updated on Rybelsus. Patient states he is doing well on medication. Requesting to increase to higher dose 7mg. If Rx is for 90 days he would like it send to [facility]. If 30 day Rx, [facility]. Please call patient to let him know regarding Rx.   Please advise
Patient notified and understanding voiced.
no weight-bearing restrictions

## 2025-07-30 ENCOUNTER — TRANSCRIBE ORDERS (OUTPATIENT)
Dept: ADMINISTRATIVE | Age: 60
End: 2025-07-30

## 2025-07-30 DIAGNOSIS — S46.211D STRAIN OF MUSCLE, FASCIA AND TENDON OF OTHER PARTS OF BICEPS, RIGHT ARM, SUBSEQUENT ENCOUNTER: Primary | ICD-10-CM

## 2025-08-13 ENCOUNTER — OFFICE VISIT (OUTPATIENT)
Age: 60
End: 2025-08-13
Payer: COMMERCIAL

## 2025-08-13 VITALS
OXYGEN SATURATION: 96 % | DIASTOLIC BLOOD PRESSURE: 62 MMHG | SYSTOLIC BLOOD PRESSURE: 112 MMHG | HEART RATE: 71 BPM | WEIGHT: 293.9 LBS | HEIGHT: 72 IN | TEMPERATURE: 98.7 F | BODY MASS INDEX: 39.81 KG/M2

## 2025-08-13 DIAGNOSIS — E66.9 OBESITY (BMI 30-39.9): ICD-10-CM

## 2025-08-13 DIAGNOSIS — G47.33 OSA TREATED WITH BIPAP: Primary | ICD-10-CM

## 2025-08-13 PROCEDURE — 99214 OFFICE O/P EST MOD 30 MIN: CPT

## 2025-08-13 ASSESSMENT — ENCOUNTER SYMPTOMS
SHORTNESS OF BREATH: 0
RHINORRHEA: 0
COUGH: 0
SORE THROAT: 0
WHEEZING: 0

## (undated) DEVICE — NEEDLE HYPO 25GA L1IN PIVOTING SHLD FOR LUERLOCK SYR ECLIPSE

## (undated) DEVICE — GLOVE ORANGE PI 8   MSG9080

## (undated) DEVICE — Z DISCONTINUED GLOVE SURG SZ 8 L12IN FNGR THK94MIL TRNSLUC YEL LTX HYDRGEL

## (undated) DEVICE — THIN OFFSET (9.0 X 0.38 X 25.0MM)

## (undated) DEVICE — AGENT HEMOSTATIC SURGIFLOW MATRIX KIT W/THROMBIN

## (undated) DEVICE — BREAST HERNIA: Brand: MEDLINE INDUSTRIES, INC.

## (undated) DEVICE — ANSPACH 3.5MMX8.4MM FLUT DR

## (undated) DEVICE — DRILL, 2.6 X 130MM, CANNULATED, AO: Brand: MONSTER SCREW SYSTEM

## (undated) DEVICE — PACK PROCEDURE SURG POD SC SRHP LF

## (undated) DEVICE — SUTURE VCRL SZ 3-0 L18IN ABSRB VLT L26MM SH 1/2 CIR J774D

## (undated) DEVICE — SUTURE VCRL SZ 2-0 L18IN ABSRB VLT POLYGLACTIN 910 BRAID J105T

## (undated) DEVICE — GLOVE SURG SZ 7.5 L11.73IN FNGR THK9.8MIL STRW LTX POLYMER

## (undated) DEVICE — DRAPE,INSTRUMENT,MAGNETIC,10X16: Brand: MEDLINE

## (undated) DEVICE — SPONGE,PEANUT,XRAY,ST,SM,3/8",5/CARD: Brand: MEDLINE INDUSTRIES, INC.

## (undated) DEVICE — SOLUTION IRRIG 1500ML STRL H2O USP POUR PLAS BTL

## (undated) DEVICE — K-WIRE, SINGLE ENDED TROCAR TIP, SMOOTH, 1.2 X 150MM
Type: IMPLANTABLE DEVICE | Status: NON-FUNCTIONAL
Brand: MONSTER SCREW SYSTEM
Removed: 2024-03-20

## (undated) DEVICE — SOLUTION IV IRRIG POUR BRL 0.9% SODIUM CHL 2F7124

## (undated) DEVICE — COUNTERSINK, 4.0 HEADLESS: Brand: MONSTER® SCREW SYSTEM

## (undated) DEVICE — APPLIER CLP L9.375IN APER 2.1MM CLS L3.8MM 20 SM TI CLP

## (undated) DEVICE — SUTURE VCRL SZ 3-0 L18IN ABSRB VLT SUTUPAK PRECUT W/O NDL J104T

## (undated) DEVICE — Device

## (undated) DEVICE — APPLIER LIG CLP M L11IN TI STR RNG HNDL FOR 20 CLP DISP

## (undated) DEVICE — Z DISCONTINUED USE 2273272 SOLUTION SURG PREP SCRUB POV IOD 7.5% 4 OZ

## (undated) DEVICE — PREP SOL PVP IODINE 4%  4 OZ/BTL

## (undated) DEVICE — BASIC SINGLE BASIN BTC-LF: Brand: MEDLINE INDUSTRIES, INC.

## (undated) DEVICE — Z DISCONTINUED BY MEDLINE USE 2711682 TRAY SKIN PREP DRY W/ PREM GLV

## (undated) DEVICE — PENCIL SMK EVAC ALL IN 1 DSGN ENH VISIBILITY IMPROVED AIR

## (undated) DEVICE — IMPREGNATED GAUZE DRESSING: Brand: CUTICERIN 7.5X7.5CM CTN 50

## (undated) DEVICE — GOWN,SIRUS,NONRNF,SETINSLV,XL,20/CS: Brand: MEDLINE

## (undated) DEVICE — SUTURE NONABSORBABLE MONOFILAMENT 4-0 PS-2 18 IN BLU PROLENE 8682H